# Patient Record
Sex: MALE | Race: BLACK OR AFRICAN AMERICAN | Employment: UNEMPLOYED | URBAN - METROPOLITAN AREA
[De-identification: names, ages, dates, MRNs, and addresses within clinical notes are randomized per-mention and may not be internally consistent; named-entity substitution may affect disease eponyms.]

---

## 2020-06-08 ENCOUNTER — VIRTUAL VISIT (OUTPATIENT)
Dept: INTERNAL MEDICINE CLINIC | Age: 60
End: 2020-06-08

## 2020-06-08 DIAGNOSIS — R35.1 NOCTURIA: ICD-10-CM

## 2020-06-08 DIAGNOSIS — R06.09 DOE (DYSPNEA ON EXERTION): ICD-10-CM

## 2020-06-08 DIAGNOSIS — I10 ESSENTIAL HYPERTENSION: Primary | ICD-10-CM

## 2020-06-08 DIAGNOSIS — F17.200 SMOKER: ICD-10-CM

## 2020-06-08 DIAGNOSIS — R05.3 CHRONIC COUGH: ICD-10-CM

## 2020-06-08 RX ORDER — HYDROCHLOROTHIAZIDE 25 MG/1
TABLET ORAL
Qty: 90 TAB | Refills: 0 | Status: SHIPPED | OUTPATIENT
Start: 2020-06-08 | End: 2020-07-23 | Stop reason: SDUPTHER

## 2020-06-08 NOTE — PROGRESS NOTES
Chief Complaint   Patient presents with   Inez Chavez Establish Care    Breathing Problem     pt is requesting an xray of lungs    Labs     pt is requesting prostate labs     Shoulder Pain     pt states he has pain when attempting to lift arms above head       1. Have you been to the ER, urgent care clinic since your last visit? Hospitalized since your last visit? No    2. Have you seen or consulted any other health care providers outside of the [address] [place] [address] since your last visit? Include any pap smears or colon screening.  No

## 2020-06-08 NOTE — PROGRESS NOTES
Judy Braxton is a 63 y.o. male evaluated via audio only technology on 6/8/2020. Consent: He and/or his health care decision maker is aware that he may receive a bill for this audio only encounter, depending on his insurance coverage, and has provided verbal consent to proceed: Yes    I communicated with the patient and/or health care decision maker about the nature and details of the following:  Assessment & Plan:   Diagnoses and all orders for this visit:    1. Essential hypertension  -     METABOLIC PANEL, COMPREHENSIVE  -     CBC WITH AUTOMATED DIFF  -     LIPID PANEL    2. PENA (dyspnea on exertion)  -     XR CHEST PA LAT; Future    3. Smoker    4. Chronic cough  -     XR CHEST PA LAT; Future    5. Nocturia  -     PSA W/ REFLX FREE PSA    Other orders  -     hydroCHLOROthiazide (HYDRODIURIL) 25 mg tablet; TAKE ONE TABLET BY MOUTH EVERY DAY FOR BLOOD PRESSURE      The complexity of medical decision making for this visit is moderate     Shoulder pain is not acute will eval in office    12  Subjective: Judy Braxton is a 63 y.o. male who was seen for Establish Care; Breathing Problem (pt is requesting an xray of lungs); Labs (pt is requesting prostate labs ); and Shoulder Pain (pt states he has pain when attempting to lift arms above head)      Prior to Admission medications    Medication Sig Start Date End Date Taking?  Authorizing Provider   hydroCHLOROthiazide (HYDRODIURIL) 25 mg tablet TAKE ONE TABLET BY MOUTH EVERY DAY FOR BLOOD PRESSURE 6/8/20  Yes William Brooks NP   hydrochlorothiazide (HYDRODIURIL) 25 mg tablet TAKE ONE TABLET BY MOUTH EVERY DAY FOR BLOOD PRESSURE 8/31/16 6/8/20  Mahad Birmingham MD   hydrochlorothiazide (HYDRODIURIL) 25 mg tablet TAKE ONE TABLET BY MOUTH EVERY DAY FOR BLOOD PRESSURE 7/22/14 6/8/20  Mahad Birmingham MD     No Known Allergies    Patient Active Problem List   Diagnosis Code    Pharyngitis, acute J02.9    Hypertension I10       He is looking to est care, last seen at [facility]    Hypertension ROS:  taking medications as instructed, no medication side effects noted, no TIAs, no chest pain on exertion, no dyspnea on exertion, no swelling of ankles reports bp has been good at [facility]    Prostatic symptoms; nocturia - several times per night - no recent labs    Shoulder pain; acute on chronic - believes he had an injury while lifting weight some years ago. Pain w/ abduction - is able to fully abduct. occ tinging to hands. No     Primary concern is PENA: this has been going for approx 6 months but worsening. He says that when he exerts himself sometimes he has trouble catching his breath - he says that It feels like there is fluid in his lungs. He denies leg swelling, chest pain. He says it seems to be worse w/ humid weather. Denies wheezing. No doshi done thus far. He does smoke 1 PPD  He denies leg swelling. He thinks his last weight is in the 170's  He has occ cough w/ clear mucous        ROS    I affirm this is a Patient-Initiated Episode with a Patient who has not had a related appointment within my department in the past 7 days or scheduled within the next 24 hours. Total Time: minutes: 11-20 minutes    Note: not billable if this call serves to triage the patient into an appointment for the relevant concern      Cindy Grace.  [address] [facility], NP

## 2020-06-09 ENCOUNTER — HOSPITAL ENCOUNTER (OUTPATIENT)
Dept: GENERAL RADIOLOGY | Age: 60
Discharge: HOME | End: 2020-06-09
Payer: COMMERCIAL

## 2020-06-09 DIAGNOSIS — R05.3 CHRONIC COUGH: ICD-10-CM

## 2020-06-09 DIAGNOSIS — R06.09 DOE (DYSPNEA ON EXERTION): ICD-10-CM

## 2020-06-09 PROCEDURE — 71046 X-RAY EXAM CHEST 2 VIEWS: CPT

## 2020-06-10 ENCOUNTER — TELEPHONE (OUTPATIENT)
Dept: INTERNAL MEDICINE CLINIC | Age: 60
End: 2020-06-10

## 2020-06-10 LAB
ALBUMIN SERPL-MCNC: 4.4 G/DL (ref 3.8–4.9)
ALBUMIN/GLOB SERPL: 1.6 {RATIO} (ref 1.2–2.2)
ALP SERPL-CCNC: 109 IU/L (ref 39–117)
ALT SERPL-CCNC: 24 IU/L (ref 0–44)
AST SERPL-CCNC: 25 IU/L (ref 0–40)
BASOPHILS # BLD AUTO: 0 X10E3/UL (ref 0–0.2)
BASOPHILS NFR BLD AUTO: 1 %
BILIRUB SERPL-MCNC: 0.3 MG/DL (ref 0–1.2)
BUN SERPL-MCNC: 15 MG/DL (ref 6–24)
BUN/CREAT SERPL: 12 (ref 9–20)
CALCIUM SERPL-MCNC: 9.7 MG/DL (ref 8.7–10.2)
CHLORIDE SERPL-SCNC: 99 MMOL/L (ref 96–106)
CHOLEST SERPL-MCNC: 188 MG/DL (ref 100–199)
CO2 SERPL-SCNC: 29 MMOL/L (ref 20–29)
CREAT SERPL-MCNC: 1.26 MG/DL (ref 0.76–1.27)
EOSINOPHIL # BLD AUTO: 0.2 X10E3/UL (ref 0–0.4)
EOSINOPHIL NFR BLD AUTO: 3 %
ERYTHROCYTE [DISTWIDTH] IN BLOOD BY AUTOMATED COUNT: 13.1 % (ref 11.6–15.4)
GLOBULIN SER CALC-MCNC: 2.8 G/DL (ref 1.5–4.5)
GLUCOSE SERPL-MCNC: 90 MG/DL (ref 65–99)
HCT VFR BLD AUTO: 47.1 % (ref 37.5–51)
HDLC SERPL-MCNC: 64 MG/DL
HGB BLD-MCNC: 15.1 G/DL (ref 13–17.7)
IMM GRANULOCYTES # BLD AUTO: 0 X10E3/UL (ref 0–0.1)
IMM GRANULOCYTES NFR BLD AUTO: 0 %
INTERPRETATION, 910389: NORMAL
LDLC SERPL CALC-MCNC: 99 MG/DL (ref 0–99)
LYMPHOCYTES # BLD AUTO: 2.1 X10E3/UL (ref 0.7–3.1)
LYMPHOCYTES NFR BLD AUTO: 29 %
MCH RBC QN AUTO: 27.1 PG (ref 26.6–33)
MCHC RBC AUTO-ENTMCNC: 32.1 G/DL (ref 31.5–35.7)
MCV RBC AUTO: 84 FL (ref 79–97)
MONOCYTES # BLD AUTO: 0.7 X10E3/UL (ref 0.1–0.9)
MONOCYTES NFR BLD AUTO: 9 %
NEUTROPHILS # BLD AUTO: 4.3 X10E3/UL (ref 1.4–7)
NEUTROPHILS NFR BLD AUTO: 58 %
PLATELET # BLD AUTO: 264 X10E3/UL (ref 150–450)
POTASSIUM SERPL-SCNC: 4.3 MMOL/L (ref 3.5–5.2)
PROT SERPL-MCNC: 7.2 G/DL (ref 6–8.5)
PSA SERPL-MCNC: 1.6 NG/ML (ref 0–4)
RBC # BLD AUTO: 5.58 X10E6/UL (ref 4.14–5.8)
REFLEX CRITERIA: NORMAL
SODIUM SERPL-SCNC: 141 MMOL/L (ref 134–144)
TRIGL SERPL-MCNC: 126 MG/DL (ref 0–149)
VLDLC SERPL CALC-MCNC: 25 MG/DL (ref 5–40)
WBC # BLD AUTO: 7.3 X10E3/UL (ref 3.4–10.8)

## 2020-06-10 RX ORDER — ALBUTEROL SULFATE 90 UG/1
1 AEROSOL, METERED RESPIRATORY (INHALATION)
Qty: 1 INHALER | Refills: 0 | Status: SHIPPED | OUTPATIENT
Start: 2020-06-10 | End: 2020-07-23 | Stop reason: SDUPTHER

## 2020-06-10 NOTE — TELEPHONE ENCOUNTER
Informed pt of lab results, x-ray findings, and rx sent. Pt understood. Informed pt provider recommends that he stop smoking. Pt would like a rx for nicotine patches.  Pt is scheduled 07/23/2020 in office at 3:45PM.

## 2020-06-10 NOTE — TELEPHONE ENCOUNTER
----- Message from Jatin Grace. Raul Contreras NP sent at 6/10/2020  8:40 AM EDT -----  Please let him know there is no fluid or masses seen on his cxr  He does have what is called hyperinflated lungs which means that over time his lungs have expanded to try to get more oxygen - likely from his smoking  This is consistent w/ COPD or emphysema  He needs to quit smoking! I have also sent an inhaler to the pharmacy for when he feels winded  Pharmacy can show him how to use if he has not used one before and then schedule him to see me in about 4 weeks  Also see if he wants to try some patches for smoking - sometimes covered by insurance sometimes not  ----- Message -----  From: Jorge Landaverde Results In  Sent: 6/9/2020   1:50 PM EDT  To: Jatin Grace.  Raul Contreras NP

## 2020-06-11 RX ORDER — IBUPROFEN 200 MG
1 TABLET ORAL EVERY 24 HOURS
Qty: 30 PATCH | Refills: 0 | Status: SHIPPED | OUTPATIENT
Start: 2020-06-11 | End: 2020-07-22

## 2020-07-22 RX ORDER — IBUPROFEN 200 MG
TABLET ORAL
Qty: 28 PATCH | Refills: 0 | Status: SHIPPED | OUTPATIENT
Start: 2020-07-22 | End: 2021-05-21 | Stop reason: SDUPTHER

## 2020-07-23 ENCOUNTER — OFFICE VISIT (OUTPATIENT)
Dept: INTERNAL MEDICINE CLINIC | Age: 60
End: 2020-07-23

## 2020-07-23 VITALS
TEMPERATURE: 99.2 F | WEIGHT: 176 LBS | SYSTOLIC BLOOD PRESSURE: 130 MMHG | OXYGEN SATURATION: 91 % | HEART RATE: 83 BPM | RESPIRATION RATE: 18 BRPM | BODY MASS INDEX: 26.07 KG/M2 | DIASTOLIC BLOOD PRESSURE: 90 MMHG | HEIGHT: 69 IN

## 2020-07-23 DIAGNOSIS — Z12.11 COLON CANCER SCREENING: ICD-10-CM

## 2020-07-23 DIAGNOSIS — R06.09 DOE (DYSPNEA ON EXERTION): ICD-10-CM

## 2020-07-23 DIAGNOSIS — R35.0 URINARY FREQUENCY: Primary | ICD-10-CM

## 2020-07-23 DIAGNOSIS — I10 ESSENTIAL HYPERTENSION: ICD-10-CM

## 2020-07-23 DIAGNOSIS — F17.210 CIGARETTE NICOTINE DEPENDENCE WITHOUT COMPLICATION: ICD-10-CM

## 2020-07-23 LAB
BILIRUB UR QL STRIP: NORMAL
GLUCOSE UR-MCNC: NEGATIVE MG/DL
KETONES P FAST UR STRIP-MCNC: NORMAL MG/DL
PH UR STRIP: 5 [PH] (ref 4.6–8)
PROT UR QL STRIP: NORMAL
SP GR UR STRIP: 1.03 (ref 1–1.03)
UA UROBILINOGEN AMB POC: NORMAL (ref 0.2–1)
URINALYSIS CLARITY POC: CLEAR
URINALYSIS COLOR POC: NORMAL
URINE BLOOD POC: NORMAL
URINE LEUKOCYTES POC: NEGATIVE
URINE NITRITES POC: NEGATIVE

## 2020-07-23 RX ORDER — VARENICLINE TARTRATE 25 MG
KIT ORAL
Qty: 1 DOSE PACK | Refills: 0 | Status: SHIPPED | OUTPATIENT
Start: 2020-07-23 | End: 2021-05-20 | Stop reason: SINTOL

## 2020-07-23 RX ORDER — TAMSULOSIN HYDROCHLORIDE 0.4 MG/1
0.4 CAPSULE ORAL DAILY
Qty: 30 CAP | Refills: 0 | Status: SHIPPED | OUTPATIENT
Start: 2020-07-23 | End: 2022-03-17 | Stop reason: SDUPTHER

## 2020-07-23 RX ORDER — ALBUTEROL SULFATE 90 UG/1
1 AEROSOL, METERED RESPIRATORY (INHALATION)
Qty: 1 INHALER | Refills: 0 | Status: SHIPPED | OUTPATIENT
Start: 2020-07-23 | End: 2021-05-20 | Stop reason: SDUPTHER

## 2020-07-23 RX ORDER — HYDROCHLOROTHIAZIDE 25 MG/1
TABLET ORAL
Qty: 90 TAB | Refills: 0 | Status: SHIPPED | OUTPATIENT
Start: 2020-07-23 | End: 2020-10-19

## 2020-07-23 NOTE — PROGRESS NOTES
Subjective: (As above and below)     Chief Complaint   Patient presents with    Hypertension     follow up    Request For New Medication     nicotine pills     Mass     pt reports lump in both hands      Stacy Emmanuel is a 63y.o. year old male who presents for     Hypertension ROS:  taking medications as instructed, no medication side effects noted, no TIAs, no chest pain on exertion, no dyspnea on exertion, no swelling of ankles    Smoking: patches have not worked, he is interested in oral agents, has not yet tried  He still has some PENA which is random, he feels like when he has to exert himself he gets winded but not always. He uses his inhaler infrequently. He denies leg swelling or chest pain    Urinary frequency: he voids a lot thru the day and throughout the night. He is on hctz, however, has still had symptoms when he had moments w/o meds. He is not drinking excess caffeine and has s/s even on days when he is not drinking a lot of fluids. PSA was normal  No constipation  No dysuria. Colonoscopy: due          Reviewed PmHx, RxHx, FmHx, SocHx, AllgHx and updated in chart. History reviewed. No pertinent family history. Past Medical History:   Diagnosis Date    Hypertension       Social History     Socioeconomic History    Marital status:      Spouse name: Not on file    Number of children: 1    Years of education: Not on file    Highest education level: Not on file   Occupational History    Occupation: [employer]     Employer: [employer]     Comment: s. Tobacco Use    Smoking status: Current Every Day Smoker     Packs/day: 1.00    Smokeless tobacco: Never Used   Substance and Sexual Activity    Alcohol use: No    Drug use: No    Sexual activity: Not Currently     Partners: Female   Social History Narrative    Lives with girlfriend, has one son, age 15, lives with mother.  Works at [employer] for 3 years          Current Outpatient Medications   Medication Sig    hydroCHLOROthiazide (HYDRODIURIL) 25 mg tablet TAKE ONE TABLET BY MOUTH EVERY DAY FOR BLOOD PRESSURE    albuterol (PROVENTIL HFA, VENTOLIN HFA, PROAIR HFA) 90 mcg/actuation inhaler Take 1 Puff by inhalation every six (6) hours as needed for Wheezing or Shortness of Breath.  varenicline (CHANTIX STARTER KARLOS) 0.5 mg (11)- 1 mg (42) DsPk Per dose pack    tamsulosin (Flomax) 0.4 mg capsule Take 1 Cap by mouth daily.  nicotine (NICODERM CQ) 21 mg/24 hr APPLY 1 PATCH BY TRANSDERMAL ROUTE EVERY TWENTY-FOUR (24) HOURS FOR 30 DAYS. No current facility-administered medications for this visit. Review of Systems:   Constitutional:    Negative for fever and chills, negative diaphoresis. HEENT:              Negative for neck pain and stiffness. Eyes:                  Negative for visual disturbance, itching, redness or discharge. Respiratory:        Negative for cough and shortness of breath. Cardiovascular:  Negative for chest pain and palpitations. Gastrointestinal: Negative for nausea, vomiting, abdominal pain, diarrhea or constipation. Genitourinary:     Negative for dysuria and frequency. Musculoskeletal: Negative for falls, tenderness and swelling. Skin:                    Rough patch on hands  Neurological:       Negative for dizzyness, seizure, loss of consciousness, weakness and numbness.      Objective:     Vitals:    07/23/20 1548 07/23/20 1632   BP: (!) 128/100 130/90   Pulse: 83    Resp: 18    Temp: 99.2 °F (37.3 °C)    TempSrc: Oral    SpO2: 91%    Weight: 176 lb (79.8 kg)    Height: 5' 9\" (1.753 m)        Results for orders placed or performed in visit on 07/23/20   AMB POC URINALYSIS DIP STICK AUTO W/O MICRO   Result Value Ref Range    Color (UA POC) Dark Yellow     Clarity (UA POC) Clear     Glucose (UA POC) Negative Negative    Bilirubin (UA POC) 1+ Negative    Ketones (UA POC) Trace Negative    Specific gravity (UA POC) 1.030 1.001 - 1.035    Blood (UA POC) Trace Negative    pH (UA POC) 5.0 4.6 - 8.0    Protein (UA POC) 1+ Negative    Urobilinogen (UA POC) 1 mg/dL 0.2 - 1    Nitrites (UA POC) Negative Negative    Leukocyte esterase (UA POC) Negative Negative         Physical Examination: General appearance - alert, well appearing, and in no distress  Chest - clear to auscultation, no wheezes, rales or rhonchi, symmetric air entry  Heart - normal rate, regular rhythm, normal S1, S2, no murmurs, rubs, clicks or gallops  Skin - hands calloused    Assessment/ Plan:     Follow-up and Dispositions    · Return in about 3 months (around 10/23/2020), or if symptoms worsen or fail to improve. 1. Urinary frequency  ? BPH trial flomax while getting in w/ urology  - AMB POC URINALYSIS DIP STICK AUTO W/O MICRO  - REFERRAL TO UROLOGY  - tamsulosin (Flomax) 0.4 mg capsule; Take 1 Cap by mouth daily. Dispense: 30 Cap; Refill: 0    2. Colon cancer screening    - REFERRAL TO GASTROENTEROLOGY    3. Cigarette nicotine dependence without complication  Discussed med use  - varenicline (CHANTIX STARTER KARLOS) 0.5 mg (11)- 1 mg (42) DsPk; Per dose pack  Dispense: 1 Dose Pack; Refill: 0    4. Essential hypertension  Consider trialing to different med if urinary s/s do not change w/ flomax    5. PENA (dyspnea on exertion)    - ECHO ADULT COMPLETE; Future        I have discussed the diagnosis with the patient and the intended plan as seen in the above orders. The patient has received an after-visit summary and questions were answered concerning future plans. Pt conveyed understanding of plan. Medication Side Effects and Warnings were discussed with patient: yes  Patient Labs were reviewed: yes  Patient Past Records were reviewed:  yes    Liliana Mcguire.  Julissa Johnson, GUILLE

## 2020-07-23 NOTE — PROGRESS NOTES
Chief Complaint   Patient presents with    Hypertension     follow up    Request For New Medication     nicotine pills     Mass     pt reports lump in both hands        1. Have you been to the ER, urgent care clinic since your last visit? Hospitalized since your last visit? No    2. Have you seen or consulted any other health care providers outside of the [address] [place] [address] since your last visit? Include any pap smears or colon screening.  No

## 2020-07-23 NOTE — PATIENT INSTRUCTIONS
Goal blood pressure is less than 140/90- check a few times per week and write down    Try flomax for urine symptoms - if this does not help over the next week or so then we will try a different blood pressure med    Let me know when you are done with the chantix starter pack    Ultrasound of heart ordered      Varenicline (By mouth)   Varenicline ([id number])  Helps you quit smoking, as part of a support program.   Brand Name(s): Chantix, Chantix Starting Month Rufino   There may be other brand names for this medicine. When This Medicine Should Not Be Used: This medicine is not right for everyone. Do not use it if you had an allergic reaction to varenicline. How to Use This Medicine:   Tablet  · Take your medicine as directed. Your dose or schedule may need to be changed to find what works best for you. · Tell your doctor what date you have set to stop smoking. This medicine needs to be started 1 week before that date. · It is best to take this medicine with a full glass of water after you eat. · This medicine should come with a Medication Guide. Ask your pharmacist for a copy if you do not have one. · Missed dose: Take a dose as soon as you remember. If it is almost time for your next dose, wait until then and take a regular dose. Do not take extra medicine to make up for a missed dose. · Store the medicine in a closed container at room temperature, away from heat, moisture, and direct light. Drugs and Foods to Avoid:   Ask your doctor or pharmacist before using any other medicine, including over-the-counter medicines, vitamins, and herbal products. · Some medicines can affect how varenicline works. Tell your doctor if you are using any of the following:  ¨ Insulin  ¨ Theophylline  ¨ Blood thinner (including warfarin)  · This medicine can affect your ability to tolerate alcohol. Limit the amount of alcohol that you drink until you know how this medicine affects you.   Warnings While Using This Medicine: · Tell your doctor if you are pregnant or breastfeeding, or if you have kidney disease, heart or blood vessel problems, angina, or a history of heart attack, stroke, depression or mental health problems, or seizures. · For some people, this medicine may increase mental or emotional problems. This may lead to thoughts of suicide and violence. Talk with your doctor right away if you have any thought or behavior changes that concern you. Tell your doctor if you or anyone in your family has a history of bipolar disorder or suicide attempts. · This medicine may cause the following problems:  ¨ Increased risk of heart attack or stroke  ¨ Serious skin reactions  ¨ Sleepwalking  · This medicine may cause you to become dizzy or drowsy, or have trouble concentrating. Do not drive or do anything else that could be dangerous until you know this medicine affects you. · Your doctor will check your progress and the effects of this medicine at regular visits. Keep all appointments. · Keep all medicine out of the reach of children. Never share your medicine with anyone.   Possible Side Effects While Using This Medicine:   Call your doctor right away if you notice any of these side effects:  · Allergic reaction: Itching or hives, swelling in your face or hands, swelling or tingling in your mouth or throat, chest tightness, trouble breathing  · Blistering, peeling, red skin rash  · Chest pain, fast, pounding, or uneven heartbeat  · Feeling anxious, depressed, restless, or irritable  · Numbness or weakness on one side of your body, sudden or severe headache, problems with vision, speech, or walking  · Seeing or hearing things that are not really there  · Seizures  · Thoughts of hurting yourself or others, unusual moods or behaviors  If you notice these less serious side effects, talk with your doctor:   · Headache  · Nausea, gas  · Trouble sleeping, unusual dreams, sleepwalking  If you notice other side effects that you think are caused by this medicine, tell your doctor. Call your doctor for medical advice about side effects. You may report side effects to FDA at 4-658-CLV-1655  © 2017 [facility] Information is for End User's use only and may not be sold, redistributed or otherwise used for commercial purposes. The above information is an  only. It is not intended as medical advice for individual conditions or treatments. Talk to your doctor, nurse or pharmacist before following any medical regimen to see if it is safe and effective for you.

## 2020-07-31 ENCOUNTER — HOSPITAL ENCOUNTER (OUTPATIENT)
Dept: NON INVASIVE DIAGNOSTICS | Age: 60
Discharge: HOME | End: 2020-07-31
Attending: NURSE PRACTITIONER
Payer: COMMERCIAL

## 2020-07-31 DIAGNOSIS — R06.09 DOE (DYSPNEA ON EXERTION): ICD-10-CM

## 2020-07-31 LAB
ECHO AO ROOT DIAM: 2.75 CM
ECHO AV AREA PLAN: 3.27 CM2
ECHO EST RA PRESSURE: 5 MMHG
ECHO LA AREA 4C: 11.7 CM2
ECHO LA MAJOR AXIS: 3.36 CM
ECHO LA VOL 4C: 24.21 ML (ref 18–58)
ECHO LV EDV A4C: 74.63 ML
ECHO LV EJECTION FRACTION A4C: 62 PERCENT
ECHO LV ESV A4C: 28.41 ML
ECHO LV INTERNAL DIMENSION DIASTOLIC: 4.99 CM (ref 4.2–5.9)
ECHO LV INTERNAL DIMENSION SYSTOLIC: 3.44 CM
ECHO LV IVSD: 1.12 CM (ref 0.6–1)
ECHO LV MASS 2D: 216.9 G (ref 88–224)
ECHO LV POSTERIOR WALL DIASTOLIC: 1.16 CM (ref 0.6–1)
ECHO LVOT DIAM: 2.07 CM
ECHO LVOT PEAK GRADIENT: 1.58 MMHG
ECHO LVOT PEAK VELOCITY: 62.81 CM/S
ECHO MV A VELOCITY: 38.97 CM/S
ECHO MV AREA PHT: 6.89 CM2
ECHO MV AREA PLAN: 6.43 CM2
ECHO MV E DECELERATION TIME (DT): 0.09 S
ECHO MV E VELOCITY: 32.8 CM/S
ECHO MV E/A RATIO: 0.84
ECHO MV MAX VELOCITY: 59.69 CM/S
ECHO MV MEAN GRADIENT: 0.38 MMHG
ECHO MV PEAK GRADIENT: 1.43 MMHG
ECHO MV PRESSURE HALF TIME (PHT): 0.03 S
ECHO MV VTI: 11.68 CM
ECHO PV PEAK INSTANTANEOUS GRADIENT SYSTOLIC: 2.62 MMHG
ECHO PV REGURGITANT MAX VELOCITY: 80.76 CM/S
ECHO RA AREA 4C: 14.2 CM2
ECHO RIGHT VENTRICULAR SYSTOLIC PRESSURE (RVSP): 34.51 MMHG
ECHO TV REGURGITANT MAX VELOCITY: 271.61 CM/S
ECHO TV REGURGITANT PEAK GRADIENT: 29.51 MMHG

## 2020-07-31 PROCEDURE — 93306 TTE W/DOPPLER COMPLETE: CPT

## 2020-08-13 DIAGNOSIS — J43.9 PULMONARY EMPHYSEMA, UNSPECIFIED EMPHYSEMA TYPE (HCC): Primary | ICD-10-CM

## 2020-09-25 ENCOUNTER — HOSPITAL ENCOUNTER (OUTPATIENT)
Dept: CT IMAGING | Age: 60
Discharge: HOME | End: 2020-09-25
Attending: INTERNAL MEDICINE
Payer: COMMERCIAL

## 2020-09-25 DIAGNOSIS — Z87.891 PERSONAL HISTORY OF NICOTINE DEPENDENCE: ICD-10-CM

## 2020-09-25 PROCEDURE — G0297 LDCT FOR LUNG CA SCREEN: HCPCS

## 2020-10-19 RX ORDER — HYDROCHLOROTHIAZIDE 25 MG/1
TABLET ORAL
Qty: 90 TAB | Refills: 0 | Status: SHIPPED | OUTPATIENT
Start: 2020-10-19 | End: 2021-01-26

## 2020-10-23 ENCOUNTER — ANESTHESIA EVENT (OUTPATIENT)
Dept: SURGERY | Age: 60
End: 2020-10-23
Payer: COMMERCIAL

## 2020-10-23 ENCOUNTER — HOSPITAL ENCOUNTER (OUTPATIENT)
Dept: PREADMISSION TESTING | Age: 60
Discharge: HOME | End: 2020-10-23
Payer: COMMERCIAL

## 2020-10-23 PROCEDURE — 87635 SARS-COV-2 COVID-19 AMP PRB: CPT

## 2020-10-24 LAB
HEALTH STATUS, XMCV2T: NORMAL
SARS-COV-2, COV2NT: NOT DETECTED
SOURCE, COVRS: NORMAL
SPECIMEN SOURCE, FCOV2M: NORMAL
SPECIMEN TYPE, XMCV1T: NORMAL

## 2020-10-27 ENCOUNTER — HOSPITAL ENCOUNTER (OUTPATIENT)
Age: 60
Setting detail: OUTPATIENT SURGERY
Discharge: HOME | End: 2020-10-27
Attending: INTERNAL MEDICINE | Admitting: INTERNAL MEDICINE
Payer: COMMERCIAL

## 2020-10-27 ENCOUNTER — ANESTHESIA (OUTPATIENT)
Dept: SURGERY | Age: 60
End: 2020-10-27
Payer: COMMERCIAL

## 2020-10-27 VITALS
DIASTOLIC BLOOD PRESSURE: 88 MMHG | WEIGHT: 188 LBS | OXYGEN SATURATION: 95 % | RESPIRATION RATE: 14 BRPM | SYSTOLIC BLOOD PRESSURE: 120 MMHG | BODY MASS INDEX: 27.85 KG/M2 | HEART RATE: 66 BPM | TEMPERATURE: 98.1 F | HEIGHT: 69 IN

## 2020-10-27 PROCEDURE — 76060000031 HC ANESTHESIA FIRST 0.5 HR: Performed by: INTERNAL MEDICINE

## 2020-10-27 PROCEDURE — 2709999900 HC NON-CHARGEABLE SUPPLY: Performed by: INTERNAL MEDICINE

## 2020-10-27 PROCEDURE — 76010000154 HC OR TIME FIRST 0.5 HR: Performed by: INTERNAL MEDICINE

## 2020-10-27 PROCEDURE — 74011000250 HC RX REV CODE- 250: Performed by: ANESTHESIOLOGY

## 2020-10-27 PROCEDURE — 76210000020 HC REC RM PH II FIRST 0.5 HR: Performed by: INTERNAL MEDICINE

## 2020-10-27 PROCEDURE — 76210000063 HC OR PH I REC FIRST 0.5 HR: Performed by: INTERNAL MEDICINE

## 2020-10-27 PROCEDURE — 74011250636 HC RX REV CODE- 250/636: Performed by: ANESTHESIOLOGY

## 2020-10-27 RX ORDER — DEXTROSE MONOHYDRATE AND SODIUM CHLORIDE 5; .9 G/100ML; G/100ML
100 INJECTION, SOLUTION INTRAVENOUS CONTINUOUS
Status: DISCONTINUED | OUTPATIENT
Start: 2020-10-27 | End: 2020-10-27 | Stop reason: HOSPADM

## 2020-10-27 RX ORDER — FLUMAZENIL 0.1 MG/ML
0.2 INJECTION INTRAVENOUS
Status: DISCONTINUED | OUTPATIENT
Start: 2020-10-27 | End: 2020-10-27 | Stop reason: HOSPADM

## 2020-10-27 RX ORDER — SODIUM CHLORIDE 0.9 % (FLUSH) 0.9 %
5-40 SYRINGE (ML) INJECTION AS NEEDED
Status: DISCONTINUED | OUTPATIENT
Start: 2020-10-27 | End: 2020-10-27 | Stop reason: SDUPTHER

## 2020-10-27 RX ORDER — HYDROMORPHONE HYDROCHLORIDE 1 MG/ML
0.5 INJECTION, SOLUTION INTRAMUSCULAR; INTRAVENOUS; SUBCUTANEOUS
Status: DISCONTINUED | OUTPATIENT
Start: 2020-10-27 | End: 2020-10-27 | Stop reason: HOSPADM

## 2020-10-27 RX ORDER — SODIUM CHLORIDE 0.9 % (FLUSH) 0.9 %
5-40 SYRINGE (ML) INJECTION EVERY 8 HOURS
Status: DISCONTINUED | OUTPATIENT
Start: 2020-10-27 | End: 2020-10-27 | Stop reason: HOSPADM

## 2020-10-27 RX ORDER — LIDOCAINE HYDROCHLORIDE 20 MG/ML
INJECTION, SOLUTION INFILTRATION; PERINEURAL AS NEEDED
Status: DISCONTINUED | OUTPATIENT
Start: 2020-10-27 | End: 2020-10-27 | Stop reason: HOSPADM

## 2020-10-27 RX ORDER — ATROPINE SULFATE 1 MG/ML
0.5 INJECTION, SOLUTION INTRAVENOUS
Status: DISCONTINUED | OUTPATIENT
Start: 2020-10-27 | End: 2020-10-27 | Stop reason: HOSPADM

## 2020-10-27 RX ORDER — SODIUM CHLORIDE 0.9 % (FLUSH) 0.9 %
5-40 SYRINGE (ML) INJECTION EVERY 8 HOURS
Status: DISCONTINUED | OUTPATIENT
Start: 2020-10-27 | End: 2020-10-27 | Stop reason: SDUPTHER

## 2020-10-27 RX ORDER — FENTANYL CITRATE 50 UG/ML
100 INJECTION, SOLUTION INTRAMUSCULAR; INTRAVENOUS ONCE
Status: DISCONTINUED | OUTPATIENT
Start: 2020-10-27 | End: 2020-10-27 | Stop reason: HOSPADM

## 2020-10-27 RX ORDER — EPINEPHRINE 0.1 MG/ML
1 INJECTION INTRACARDIAC; INTRAVENOUS
Status: DISCONTINUED | OUTPATIENT
Start: 2020-10-27 | End: 2020-10-27 | Stop reason: HOSPADM

## 2020-10-27 RX ORDER — MIDAZOLAM HYDROCHLORIDE 1 MG/ML
5 INJECTION, SOLUTION INTRAMUSCULAR; INTRAVENOUS
Status: DISCONTINUED | OUTPATIENT
Start: 2020-10-27 | End: 2020-10-27 | Stop reason: HOSPADM

## 2020-10-27 RX ORDER — DEXTROMETHORPHAN/PSEUDOEPHED 2.5-7.5/.8
1.2 DROPS ORAL
Status: DISCONTINUED | OUTPATIENT
Start: 2020-10-27 | End: 2020-10-27 | Stop reason: HOSPADM

## 2020-10-27 RX ORDER — SODIUM CHLORIDE 0.9 % (FLUSH) 0.9 %
5-40 SYRINGE (ML) INJECTION AS NEEDED
Status: DISCONTINUED | OUTPATIENT
Start: 2020-10-27 | End: 2020-10-27 | Stop reason: HOSPADM

## 2020-10-27 RX ORDER — NALOXONE HYDROCHLORIDE 0.4 MG/ML
0.4 INJECTION, SOLUTION INTRAMUSCULAR; INTRAVENOUS; SUBCUTANEOUS
Status: DISCONTINUED | OUTPATIENT
Start: 2020-10-27 | End: 2020-10-27 | Stop reason: HOSPADM

## 2020-10-27 RX ORDER — SODIUM CHLORIDE 9 MG/ML
50 INJECTION, SOLUTION INTRAVENOUS CONTINUOUS
Status: DISCONTINUED | OUTPATIENT
Start: 2020-10-27 | End: 2020-10-27 | Stop reason: HOSPADM

## 2020-10-27 RX ORDER — PROPOFOL 10 MG/ML
INJECTION, EMULSION INTRAVENOUS AS NEEDED
Status: DISCONTINUED | OUTPATIENT
Start: 2020-10-27 | End: 2020-10-27 | Stop reason: HOSPADM

## 2020-10-27 RX ORDER — SODIUM CHLORIDE, SODIUM LACTATE, POTASSIUM CHLORIDE, CALCIUM CHLORIDE 600; 310; 30; 20 MG/100ML; MG/100ML; MG/100ML; MG/100ML
50 INJECTION, SOLUTION INTRAVENOUS CONTINUOUS
Status: DISCONTINUED | OUTPATIENT
Start: 2020-10-27 | End: 2020-10-27 | Stop reason: HOSPADM

## 2020-10-27 RX ORDER — LIDOCAINE HYDROCHLORIDE 20 MG/ML
5 SOLUTION OROPHARYNGEAL AS NEEDED
Status: DISCONTINUED | OUTPATIENT
Start: 2020-10-27 | End: 2020-10-27 | Stop reason: HOSPADM

## 2020-10-27 RX ORDER — SODIUM CHLORIDE 9 MG/ML
100 INJECTION, SOLUTION INTRAVENOUS CONTINUOUS
Status: DISCONTINUED | OUTPATIENT
Start: 2020-10-27 | End: 2020-10-27 | Stop reason: HOSPADM

## 2020-10-27 RX ORDER — FENTANYL CITRATE 50 UG/ML
25 INJECTION, SOLUTION INTRAMUSCULAR; INTRAVENOUS
Status: DISCONTINUED | OUTPATIENT
Start: 2020-10-27 | End: 2020-10-27 | Stop reason: HOSPADM

## 2020-10-27 RX ORDER — LIDOCAINE HYDROCHLORIDE 10 MG/ML
0.1 INJECTION, SOLUTION EPIDURAL; INFILTRATION; INTRACAUDAL; PERINEURAL AS NEEDED
Status: DISCONTINUED | OUTPATIENT
Start: 2020-10-27 | End: 2020-10-27 | Stop reason: HOSPADM

## 2020-10-27 RX ADMIN — PROPOFOL 80 MG: 10 INJECTION, EMULSION INTRAVENOUS at 10:38

## 2020-10-27 RX ADMIN — PROPOFOL 10 MG: 10 INJECTION, EMULSION INTRAVENOUS at 10:39

## 2020-10-27 RX ADMIN — SODIUM CHLORIDE, POTASSIUM CHLORIDE, SODIUM LACTATE AND CALCIUM CHLORIDE: 600; 310; 30; 20 INJECTION, SOLUTION INTRAVENOUS at 09:15

## 2020-10-27 RX ADMIN — PROPOFOL 10 MG: 10 INJECTION, EMULSION INTRAVENOUS at 10:41

## 2020-10-27 RX ADMIN — PROPOFOL 10 MG: 10 INJECTION, EMULSION INTRAVENOUS at 10:40

## 2020-10-27 RX ADMIN — PROPOFOL 10 MG: 10 INJECTION, EMULSION INTRAVENOUS at 10:43

## 2020-10-27 RX ADMIN — PROPOFOL 10 MG: 10 INJECTION, EMULSION INTRAVENOUS at 10:44

## 2020-10-27 RX ADMIN — LIDOCAINE HYDROCHLORIDE 60 MG: 20 INJECTION, SOLUTION INFILTRATION; PERINEURAL at 10:38

## 2020-10-27 RX ADMIN — PROPOFOL 10 MG: 10 INJECTION, EMULSION INTRAVENOUS at 10:42

## 2020-10-27 NOTE — PROCEDURES
G I Procedure Note            COLONOSCOPY   Dr. Britta Espinal office   [facility] -  [address]                                   [id number]                                  [id number]   1960                                      62 y.o.                                    male      Procedure Date: 10/27/2020   [x]  Anesthesia MAC                                                                                                Pre Op Diagnosis:    Indications:                   1. COLON SCREENING                                                                                                                                                                          Post Op Diagnosis:                    1.  incomplte procedure poor prep solid stools  HEMORRHOIDS, DIVERTICULOSIS, INCOMPLETE PREP                                                                            H&p completed: Yes            Anesthesia Assessment: Performed prior to procedure:      No change  Anesthesia Plan: Performed prior to procedure:                   No change       Medications: See Reviewed List and Reconcilation           Informed consent was obtained     Risk Statement:  Prior to the procedure the risks were explained to the patient and/or to the family including but not limited to perforation, bleeding, adverse drug reaction, aspiration, and even the need for possible surgery. A colonoscopy exam is not 100% accurate which may be related to preparation or blind spots during the exam.The possibility that an abnormality and /or cancer could be missed was also discussed as well as alternative x-ray options.          Instrument:    Olympus adult Videocolonoscope                                   Immediate Procedure Reassessment Completed     With the patient in the left lateral position, a rectal examination was performed and the findings were: negative without mass, lesions or tenderness   The Olympus Video colonoscope was inserted under direct vision into the rectum. The colonoscope was passed from the rectum to the cecum, which was identified by the ileocecal valve. The colon findings demonstrated:  ANUS: Anal exam reveals no masses or external hemorrhoids, sphincter tone is normal.   RECTUM: Rectal exam reveals no masses  . SIGMOID COLON: The sigmoid was unremarkable except as noted below   Findings below   DESCENDING COLON: scattered stool The videoscolonoscope was advanced carefully. Findings below  SPLENIC FLEXURE: The splenic flexure is normal.   TRANSVERSE COLON:  Large amt solid stool procedure terminated  The typical triangular pattern was noted. Finding noted      [] mucosa normal      [x] Diverticulosis     [] avm     [] Additional findings:      A      The colonoscope was slowly withdrawn >6 minute period and the instrument was retroflexed in the rectum. The rectal findings were:Protruding lesions:     -Internal Hemorrhoids  The patient tolerated the entire procedure well.       Blood Loss nil  No complications  Anesthesia  MAC  No crystalloids  No Implants  Assistants : per nursing documentation team members     For biopsy  Specimen verification by physician and nurse two sources, name,           social security numbers     Colon preparation was poor  Recommendations:      - yearly stools for blood repeat colon 1 year 2 day prep       Copies sent to   Veena Field MD  CC:  Aicha Wiseman., NP

## 2020-10-27 NOTE — PERIOP NOTES
DISCHARGE SUMMARY from Nurse    PATIENT INSTRUCTIONS:    After general anesthesia or intravenous sedation, for 24 hours or while taking prescription Narcotics:  · Limit your activities  · Do not drive and operate hazardous machinery  · Do not make important personal or business decisions  · Do  not drink alcoholic beverages  · If you have not urinated within 8 hours after discharge, please contact your surgeon on call. Report the following to your surgeon:  · Excessive pain, swelling, redness or odor of or around the surgical area  · Temperature over 100.5  · Nausea and vomiting lasting longer than 4 hours or if unable to take medications  · Any signs of decreased circulation or nerve impairment to extremity: change in color, persistent  numbness, tingling, coldness or increase pain  · Any questions              *  Please carry medication information at all times in case of emergency situations. These are general instructions for a healthy lifestyle:    No smoking/ No tobacco products/ Avoid exposure to second hand smoke  Surgeon General's Warning:  Quitting smoking now greatly reduces serious risk to your health. Obesity, smoking, and sedentary lifestyle greatly increases your risk for illness    A healthy diet, regular physical exercise & weight monitoring are important for maintaining a healthy lifestyle    You may be retaining fluid if you have a history of heart failure or if you experience any of the following symptoms:  Weight gain of 3 pounds or more overnight or 5 pounds in a week, increased swelling in our hands or feet or shortness of breath while lying flat in bed. Please call your doctor as soon as you notice any of these symptoms; do not wait until your next office visit. The discharge information has been reviewed with the patient. The patient verbalized understanding.   Discharge medications reviewed with the patient and appropriate educational materials and side effects teaching were provided. _Pt denies any complaints at this time.  Dtr present for dc inst. Pt stable for dc to home._________________________________________________________________________________________________________________________________

## 2020-10-27 NOTE — PERIOP NOTES
Handoff Report from Operating Room to PACU    Report received from Guero Vaz and Alvin HARDY regarding Endy Engel. Surgeon(s):  Nargis Salter MD  And Procedure(s) (LRB):  COLONOSCOPY (N/A)  confirmed   with allergies discussed. Anesthesia type, drugs, patient history, complications, estimated blood loss, vital signs, intake and output, and lines and temperature were reviewed.

## 2020-10-27 NOTE — ANESTHESIA PREPROCEDURE EVALUATION
Relevant Problems   No relevant active problems       Anesthetic History   No history of anesthetic complications            Review of Systems / Medical History  Patient summary reviewed and pertinent labs reviewed    Pulmonary            Asthma        Neuro/Psych   Within defined limits           Cardiovascular    Hypertension              Exercise tolerance: >4 METS     GI/Hepatic/Renal  Within defined limits              Endo/Other  Within defined limits           Other Findings              Physical Exam    Airway  Mallampati: II  TM Distance: 4 - 6 cm  Neck ROM: normal range of motion   Mouth opening: Normal     Cardiovascular    Rhythm: regular  Rate: normal         Dental    Dentition: Lower partial plate and Upper dentition intact     Pulmonary  Breath sounds clear to auscultation               Abdominal  GI exam deferred       Other Findings            Anesthetic Plan    ASA: 2  Anesthesia type: MAC            Anesthetic plan and risks discussed with: Patient

## 2020-10-27 NOTE — ANESTHESIA POSTPROCEDURE EVALUATION
Procedure(s):  COLONOSCOPY.     MAC    Anesthesia Post Evaluation      Multimodal analgesia: multimodal analgesia not used between 6 hours prior to anesthesia start to PACU discharge  Patient location during evaluation: PACU  Patient participation: complete - patient participated  Level of consciousness: awake and alert  Pain management: adequate  Airway patency: patent  Anesthetic complications: no  Cardiovascular status: acceptable  Respiratory status: acceptable  Hydration status: acceptable  Post anesthesia nausea and vomiting:  none  Final Post Anesthesia Temperature Assessment:  Normothermia (36.0-37.5 degrees C)      INITIAL Post-op Vital signs:   Vitals Value Taken Time   /95 10/27/2020 11:05 AM   Temp 36.8 °C (98.3 °F) 10/27/2020 10:55 AM   Pulse 68 10/27/2020 11:05 AM   Resp 13 10/27/2020 11:05 AM   SpO2 94 % 10/27/2020 11:05 AM

## 2020-10-27 NOTE — H&P
G I Procedure Note           Endoscopy History and Physical               Dr. Lesvia Trevizo Office [address] [place] [address] [phone]  [id number]    1960  62 y.o.  male      Date of Procedure:   Preoperative Diagnosis:       Procedure:    10/27/2020           COLON SCREENING                              Procedure(s):  COLONOSCOPY      Gastroenterologist:  Anesthesia:           MD SARA Gilbert            History and procedure indication:  Negin Burrell is a 62 y.o. BLACK OR  male who presents with: COLON SCREENING   including the additional history of Screening ,Screening for colon cancer,,        Past Medical History:   Diagnosis Date    Hypertension       Prior to Admission medications    Medication Sig Start Date End Date Taking? Authorizing Provider   albuterol (PROVENTIL HFA, VENTOLIN HFA, PROAIR HFA) 90 mcg/actuation inhaler Take 1 Puff by inhalation every six (6) hours as needed for Wheezing or Shortness of Breath. 7/23/20  Yes Ny Friend., NP   hydroCHLOROthiazide (HYDRODIURIL) 25 mg tablet TAKE 1 TABLET BY MOUTH EVERY DAY FOR BLOOD PRESSURE 10/19/20   Ny Friend., NP   varenicline (CHANTIX STARTER KARLOS) 0.5 mg (11)- 1 mg (42) DsPk Per dose pack 7/23/20   Caridad OLSON, NP   tamsulosin (Flomax) 0.4 mg capsule Take 1 Cap by mouth daily. 7/23/20   Ny Friend., NP   nicotine (NICODERM CQ) 21 mg/24 hr APPLY 1 PATCH BY TRANSDERMAL ROUTE EVERY TWENTY-FOUR (24) HOURS FOR 30 DAYS. 7/22/20   Ny Friend., NP     No Known Allergies    Past Surgical History:   Procedure Laterality Date    HX HEENT      HX HERNIA REPAIR  8/2010    NONEMERG TRANSP MILEAGE       History reviewed. No pertinent family history.    Social History     Tobacco Use    Smoking status: Current Every Day Smoker Packs/day: 1.00    Smokeless tobacco: Never Used   Substance Use Topics    Alcohol use: No                                                      PHYSICAL EXAM   There were no vitals taken for this visit. General appearance:  alert, well appearing, and in no distress  Mental status:  normal mood, behavior, speech, dress, motor activity and thought processes  Nose:      normal and patent, no erythema, discharge or polyps  Mouth:- mucous membranes moist, pharynx normal without lesions                  [x]  No Loose teeth      []    Loose teeth  Finger opening:  []1     []1.5    [] 2     [] 2.5     [x] 3      [] 3.5     [] 4   Mallampati:         [] Class 1     [x] Class 2    [] Class 3      [] Class 4      Neck - supple,      [x] Full ROM [] Decreased ROM  [] Short Neck no significant adenopathy    Chest - clear to auscultation, no wheezes, rales or rhonchi, symmetric air entry  Heart: normal rate, regular rhythm, normal S1, S2, no murmurs, rubs, clicks or gallops  Abdomen: abdomen soft, bowel sounds  [x] normal  [] increased  [] hypoactive                       [] no tenderness  [] epigastric tenderness  [] LLQ tenderness   [] RLQ tenderness                      No masses, organomegaly or guarding. Rectal exam: negative without mass, lesions or tenderness  Extremities: peripheral pulses normal, no pedal edema, no clubbing or cyanosis  Neurologic: Alert and oriented to person, place, and time; normal strength and tone.                          Normal symmetric reflexes  Normal gait:                                      Assessement:                                 Pre op dx:  COLON SCREENING   Additional medical problems list below   Patient Active Problem List   Diagnosis Code    Pharyngitis, acute J02.9    Hypertension I10                                                                                           This note documentation was performed prior to this planned procedure       after a history and physical was performed in the office. Date: 9 29 2020   Office exam   10/27/2020 Immediate update no changes in H&P                        Pre Procedure Evaluation (per anesthesia or per h&p)                                                Sedation/Assessment:                                                                                               Mallampati Classification                            []Class 1                    []Class 2                    [] Class 3                  [] Class 4                                              ASA classfication         []     Class I: Normally healthy         []     Class II: Patient with mild systemic disease (e.g. hypertension)         []     Class III: Patient with severe systemic disease (e.g. CHF), non-decompensated         []     Class IV: Patient with severe systemic disease, decompensated         []     Class V: Moribund patient, survival unlikely                     Plan:  []  Egd                                 [x] Colonoscopy                               [] with Moderate Sedation /Conscious Sedation                                 [x] MAC          Patient stable for planned procedure. See orders.      Kailee Flower MD

## 2020-10-27 NOTE — DISCHARGE INSTRUCTIONS
Endoscopy Discharge Instructions     Dr. Tiffanie Araiza office                                            NAME: Tenzin Forrest RECORD PQQLDO:[id number]    AGE:  62 y.o. YOB: 1960                                                              FINAL Discharge Procedure and Diagnosis:       Procedure(s):  COLONOSCOPY       FINDINGS:     Poor prep   Internal hemorrhoids  Repeat test 1 year  Yearly stools for occult blood                                         MEDICATIONS    [x] CONTINUE CURRENT MEDICATIONS     [] NEW MEDICATIONS           1.    2.    3.         Testing   Schedule              Colonoscopy Screening                                   Recommendations       [x]  Repeat colonoscopy in  12 month 2nd        to Inadequate  prep    []  Repeat colonoscopy in 3 years    []  Repeat colonoscopy in 5 years    []  Repeat colonoscopy in 10 years         New additional  Tests  Call the office   [phone]) for the appointment time      []      []      []                                     YOUR NEXT APPOINTMENT WITH DR Daniel Han:                                                                                                                                [x]   None follow up with pcp   []  1 week       []   2 week    []  1 month    Always keep KEEP  APPOINTMENT WITH  Waldo Grimes NP for regular medical follow up                                                                                                                         If you had a colonoscopy the \"C\" indicates specific instructions        x                                           Diet Instructions :   Ordinarily you may resume your previous diet but your initial diet should be       Light your discharge nurse will go over this with you. Large meals can cause  abdominal discomfort after these procedures. Specific Diet Recommendations:        [x] High fiber diet. https://www.Texere/. com/diets/        [] GERD diet: avoid fried and fatty foods, peppermint, chocolate, alcohol,               coffee, citrus fruits and juices, and tomato products. Avoid lying down for            2 to 3  hours after eating. https://www.navarrete.com/. com/diets/            []  FODMAP DIET  DeathUnit.nl              []  All diets eg high fiber, gastroparesis. , weight loss , gluten free             1. [employer]Power              2.  https://www.Voz.io. ViaCyte/diets/           __x__  Amy Stabs may feel quite tired and need to rest and recuperate for several hours    following these procedures. __x__  Due to the fact that sedation was administered for this procedure, do not drive,   operate machinery or sign legal documents for the next 24 hours. __x__  Mild abdominal pain may be experienced after your procedure, but is should   disappear after several hours. Notify your physician if you have persistent pain,   tenderness or abdominal distension. __x__  C    Many patients for the first few hours following the exam may experience         belching or passing gas through the rectum. Walking may help to relieve        distention and gas pains. A warm bath or shower will often help with abdominal  cramping.                                                                                            __x__   Amy Stabs may return to your normal routine tomorrow, according to how you feel        and depending on your doctors instructions. Be sure to call your doctor to make  an appointment for a post-surgery check-up on the date your doctor has   requested.       __x__ C     Rectal bleeding or spotting in small amounts may occur with the first bowel   movement following a colonoscopy or sigmoidoscopy. If a large amount of blood is noted call immediately     __x__  You may experience a numbness or lack of sensation in throat. If present, do not     eat or drink. Before eating, test your ability to drink with small sips of water. Y     You may try clear liquids or soups. If you tolerate these, you may then eat solid     food which is not greasy or spicy. __x__ C     IF POLYPS REMOVED: Avoid any blood thinning medication such as plavix,   aspirin or coumadin  NSAIDS (like advil or alleve) for 7 days. __x__  Notify your physician if you cough or vomit blood or experience chest pain. Your biopsy or testing result should be available in 7-10 days                                                                                                                      Prescription will be electronically sent to your pharmacy you must     let your nurse know your pharmacy:                                                                                                                                          [address] [place] [address]. TO HELP ENSURE A SMOOTH RECOVERY,       IT IS IMPORTANT TO FOLLOW THEM. _x___Pamphlet /Educational Information provided for diagnostic findings     Additional education information can assessed at the sites below:   Mac   http://www.digestive. niddk.nih.gov/ddiseases/a-z.asp      Web MD patient information                                                                                                Signature of individual given instructions :   Date: 10/27/2020

## 2021-01-26 RX ORDER — HYDROCHLOROTHIAZIDE 25 MG/1
TABLET ORAL
Qty: 90 TAB | Refills: 0 | Status: SHIPPED | OUTPATIENT
Start: 2021-01-26 | End: 2021-04-26

## 2021-04-26 RX ORDER — HYDROCHLOROTHIAZIDE 25 MG/1
TABLET ORAL
Qty: 90 TAB | Refills: 0 | Status: SHIPPED | OUTPATIENT
Start: 2021-04-26 | End: 2021-05-20 | Stop reason: SINTOL

## 2021-05-20 ENCOUNTER — OFFICE VISIT (OUTPATIENT)
Dept: INTERNAL MEDICINE CLINIC | Age: 61
End: 2021-05-20
Payer: COMMERCIAL

## 2021-05-20 VITALS
TEMPERATURE: 97.3 F | DIASTOLIC BLOOD PRESSURE: 97 MMHG | RESPIRATION RATE: 16 BRPM | HEART RATE: 75 BPM | WEIGHT: 187 LBS | HEIGHT: 69 IN | OXYGEN SATURATION: 96 % | BODY MASS INDEX: 27.7 KG/M2 | SYSTOLIC BLOOD PRESSURE: 137 MMHG

## 2021-05-20 DIAGNOSIS — R35.0 URINARY FREQUENCY: Primary | ICD-10-CM

## 2021-05-20 DIAGNOSIS — I10 ESSENTIAL HYPERTENSION: ICD-10-CM

## 2021-05-20 DIAGNOSIS — G47.00 INSOMNIA, UNSPECIFIED TYPE: ICD-10-CM

## 2021-05-20 DIAGNOSIS — F17.200 SMOKER: ICD-10-CM

## 2021-05-20 LAB
BILIRUB UR QL STRIP: NORMAL
GLUCOSE UR-MCNC: NEGATIVE MG/DL
KETONES P FAST UR STRIP-MCNC: NORMAL MG/DL
PH UR STRIP: 5.5 [PH] (ref 4.6–8)
PROT UR QL STRIP: NORMAL
SP GR UR STRIP: 1.03 (ref 1–1.03)
UA UROBILINOGEN AMB POC: NORMAL (ref 0.2–1)
URINALYSIS CLARITY POC: CLEAR
URINALYSIS COLOR POC: YELLOW
URINE BLOOD POC: NORMAL
URINE LEUKOCYTES POC: NEGATIVE
URINE NITRITES POC: NEGATIVE

## 2021-05-20 PROCEDURE — 81003 URINALYSIS AUTO W/O SCOPE: CPT | Performed by: NURSE PRACTITIONER

## 2021-05-20 PROCEDURE — 99214 OFFICE O/P EST MOD 30 MIN: CPT | Performed by: NURSE PRACTITIONER

## 2021-05-20 RX ORDER — ALBUTEROL SULFATE 90 UG/1
1 AEROSOL, METERED RESPIRATORY (INHALATION)
Qty: 1 INHALER | Refills: 0 | Status: SHIPPED | OUTPATIENT
Start: 2021-05-20 | End: 2022-03-17 | Stop reason: SDUPTHER

## 2021-05-20 RX ORDER — AMLODIPINE BESYLATE 10 MG/1
10 TABLET ORAL DAILY
Qty: 90 TABLET | Refills: 0 | Status: SHIPPED | OUTPATIENT
Start: 2021-05-20 | End: 2021-09-13

## 2021-05-20 NOTE — PROGRESS NOTES
Chief Complaint   Patient presents with    Follow-up     BP, urinary frequency     Sleep Problem     pt states he wakes up every hour, does not want to try medication     Hand Problem     pt reports bumps/ callus in left palm       1. Have you been to the ER, urgent care clinic since your last visit? Hospitalized since your last visit? No    2. Have you seen or consulted any other health care providers outside of the [address] [place] [address] since your last visit? Include any pap smears or colon screening.  No

## 2021-05-20 NOTE — PATIENT INSTRUCTIONS
For sleep try melatonin (no more than 5mg)    Stop hydrochlorothiazide and start amlodipine for blood pressure to see if this improves your urine symptoms. Learning About Sleeping Well  What does sleeping well mean? Sleeping well means getting enough sleep. How much sleep is enough varies among people. The number of hours you sleep is not as important as how you feel when you wake up. If you do not feel refreshed, you probably need more sleep. Another sign of not getting enough sleep is feeling tired during the day. The average total nightly sleep time is 7½ to 8 hours. Healthy adults may need a little more or a little less than this. Why is getting enough sleep important? Getting enough quality sleep is a basic part of good health. When your sleep suffers, your mood and your thoughts can suffer too. You may find yourself feeling more grumpy or stressed. Not getting enough sleep also can lead to serious problems, including injury, accidents, anxiety, and depression. What might cause poor sleeping? Many things can cause sleep problems, including:  · Stress. Stress can be caused by fear about a single event, such as giving a speech. Or you may have ongoing stress, such as worry about work or school. · Depression, anxiety, and other mental or emotional conditions. · Changes in your sleep habits or surroundings. This includes changes that happen where you sleep, such as noise, light, or sleeping in a different bed. It also includes changes in your sleep pattern, such as having jet lag or working a late shift. · Health problems, such as pain, breathing problems, and restless legs syndrome. · Lack of regular exercise. How can you help yourself? Here are some tips that may help you sleep more soundly and wake up feeling more refreshed. Your sleeping area   · Use your bedroom only for sleeping and sex. A bit of light reading may help you fall asleep.  But if it doesn't, do your reading elsewhere in the house. Don't watch TV in bed. · Be sure your bed is big enough to stretch out comfortably, especially if you have a sleep partner. · Keep your bedroom quiet, dark, and cool. Use curtains, blinds, or a sleep mask to block out light. To block out noise, use earplugs, soothing music, or a \"white noise\" machine. Your evening and bedtime routine   · Create a relaxing bedtime routine. You might want to take a warm shower or bath, listen to soothing music, or drink a cup of noncaffeinated tea. · Go to bed at the same time every night. And get up at the same time every morning, even if you feel tired. What to avoid   · Limit caffeine (coffee, tea, caffeinated sodas) during the day, and don't have any for at least 4 to 6 hours before bedtime. · Don't drink alcohol before bedtime. Alcohol can cause you to wake up more often during the night. · Don't smoke or use tobacco, especially in the evening. Nicotine can keep you awake. · Don't take naps during the day, especially close to bedtime. · Don't lie in bed awake for too long. If you can't fall asleep, or if you wake up in the middle of the night and can't get back to sleep within 15 minutes or so, get out of bed and go to another room until you feel sleepy. · Don't take medicine right before bed that may keep you awake or make you feel hyper or energized. Your doctor can tell you if your medicine may do this and if you can take it earlier in the day. If you can't sleep   · Imagine yourself in a peaceful, pleasant scene. Focus on the details and feelings of being in a place that is relaxing. · Get up and do a quiet or boring activity until you feel sleepy. · Don't drink any liquids after 6 p.m. if you wake up often because you have to go to the bathroom. Where can you learn more? Go to http://www.gray.com/  Enter G926 in the search box to learn more about \"Learning About Sleeping Well. \"  Current as of: September 23, 2020               Content Version: 12.8  © 3071-2845 Healthwise, Incorporated. Care instructions adapted under license by [employer] (which disclaims liability or warranty for this information). If you have questions about a medical condition or this instruction, always ask your healthcare professional. [address] any warranty or liability for your use of this information.

## 2021-05-20 NOTE — PROGRESS NOTES
Subjective: (As above and below)     Chief Complaint   Patient presents with    Follow-up     BP, urinary frequency     Sleep Problem     pt states he wakes up every hour, does not want to try medication     Hand Problem     pt reports bumps/ callus in left palm     Chelsea Catalan is a 62y.o. year old male who presents for     Hypertension ROS:  taking medications as instructed, no medication side effects noted, no TIAs, no chest pain on exertion, no dyspnea on exertion, no swelling of ankles    BP Readings from Last 3 Encounters:   05/20/21 (!) 137/97   10/27/20 120/88   07/23/20 130/90       Smoking: chantix caused nightmares, he wants to quit, willing to try patches again    Urinary frequency: no dribbling, he does void 1-3 x per night  Last PSA normal, he was previously on flomax but forgot about this and does not recall if it helped  He is on HCTZ which he takes during the day  No late caffeine/fluids  No constipation    Skin problem: hard bump to left palm- unchanged for >1 year, not painful    Insomnia: trouble staying asleep, sometimes wakes to void but not always  Denies late meals  He does looks at screens before bed  No sleep apnea s/s  He is not keen on trying medication for this              Reviewed PmHx, RxHx, FmHx, SocHx, AllgHx and updated in chart. History reviewed. No pertinent family history. Past Medical History:   Diagnosis Date    Hypertension       Social History     Socioeconomic History    Marital status:      Spouse name: Not on file    Number of children: 1    Years of education: Not on file    Highest education level: Not on file   Occupational History    Occupation: [employer]     Employer: [employer]     Comment: s.    Tobacco Use    Smoking status: Current Every Day Smoker     Packs/day: 1.00    Smokeless tobacco: Never Used   Vaping Use    Vaping Use: Never used   Substance and Sexual Activity    Alcohol use: No    Drug use: No    Sexual activity: Not Currently     Partners: Female   Social History Narrative    Lives with girlfriend, has one son, age 15, lives with mother. Works at [employer] for 3 years     Social Determinants of Health     Financial Resource Strain:     Difficulty of Paying Living Expenses:    Food Insecurity:     Worried About [address] in the Last Year:    [address] in the Last Year:    Transportation Needs:     Lack of Transportation (Medical):  Lack of Transportation (Non-Medical):    Physical Activity:     Days of Exercise per Week:     Minutes of Exercise per Session:    Stress:     Feeling of Stress :    Social Connections:     Frequency of Communication with Friends and Family:     Frequency of Social Gatherings with Friends and Family:     Attends Evangelical Services:     Active Member of Clubs or Organizations:     Attends Club or Organization Meetings:     Marital Status:           Current Outpatient Medications   Medication Sig    albuterol (PROVENTIL HFA, VENTOLIN HFA, PROAIR HFA) 90 mcg/actuation inhaler Take 1 Puff by inhalation every six (6) hours as needed for Wheezing or Shortness of Breath.  hydroCHLOROthiazide (HYDRODIURIL) 25 mg tablet TAKE 1 TABLET BY MOUTH EVERY DAY FOR BLOOD PRESSURE    varenicline (CHANTIX STARTER KARLOS) 0.5 mg (11)- 1 mg (42) DsPk Per dose pack (Patient not taking: Reported on 5/20/2021)    tamsulosin (Flomax) 0.4 mg capsule Take 1 Cap by mouth daily. (Patient not taking: Reported on 5/20/2021)    nicotine (NICODERM CQ) 21 mg/24 hr APPLY 1 PATCH BY TRANSDERMAL ROUTE EVERY TWENTY-FOUR (24) HOURS FOR 30 DAYS. (Patient not taking: Reported on 5/20/2021)     No current facility-administered medications for this visit. Review of Systems:   Constitutional:    Negative for fever and chills, negative diaphoresis. HEENT:              Negative for neck pain and stiffness. Eyes:                  Negative for visual disturbance, itching, redness or discharge. Respiratory:        Negative for cough and shortness of breath. Cardiovascular:  Negative for chest pain and palpitations. Gastrointestinal: Negative for nausea, vomiting, abdominal pain, diarrhea or constipation. Genitourinary:     Negative for dysuria and frequency. Musculoskeletal: Negative for falls, tenderness and swelling. Skin:                    Negative for rash, masses or lesions. Neurological:       Negative for dizzyness, seizure, loss of consciousness, weakness and numbness. Objective:     Vitals:    05/20/21 1057   BP: (!) 137/97   Pulse: 75   Resp: 16   Temp: 97.3 °F (36.3 °C)   TempSrc: Temporal   SpO2: 96%   Weight: 187 lb (84.8 kg)   Height: 5' 9\" (1.753 m)       Results for orders placed or performed in visit on 05/20/21   AMB POC URINALYSIS DIP STICK AUTO W/O MICRO   Result Value Ref Range    Color (UA POC) Yellow     Clarity (UA POC) Clear     Glucose (UA POC) Negative Negative    Bilirubin (UA POC) 1+ Negative    Ketones (UA POC) Trace Negative    Specific gravity (UA POC) 1.030 1.001 - 1.035    Blood (UA POC) Trace Negative    pH (UA POC) 5.5 4.6 - 8.0    Protein (UA POC) 1+ Negative    Urobilinogen (UA POC) 0.2 mg/dL 0.2 - 1    Nitrites (UA POC) Negative Negative    Leukocyte esterase (UA POC) Negative Negative         Gen: Oriented to person, place and time and well-developed, well-nourished and in no distress. HEENT:    Head: normocephalic and atraumatic. Eyes:  EOM are normal. Pupils equal and round. Neck:  Normal range of motion. Neck supple. Cardiovascular: normal rate, regular rhythm and normal heart sounds. Pulmonary/Chest:  Effort normal and breath sounds normal.  No respiratory distress. No wheezes, no rales. Abdominal: soft, normal  bowel sounds. Musculoskeletal:  No edema, no tenderness. No calf tenderness or edema. Neurological:  Alert, oriented to person, place and time. Skin: skin is warm and dry.    Skin concern on left palm feels like a deep callous, not painful        Assessment/ Plan:       1. Urinary frequency    - AMB POC URINALYSIS DIP STICK AUTO W/O MICRO    2. Smoker    - nicotine (NICODERM CQ) 21 mg/24 hr; APPLY 1 PATCH BY TRANSDERMAL ROUTE EVERY TWENTY-FOUR (24) HOURS FOR 30 DAYS. Dispense: 28 Patch; Refill: 0    3. Essential hypertension  Change BP meds to see if this helps w/ urinary frequency  INI can resume flomax and refer to urology    - amLODIPine (NORVASC) 10 mg tablet; Take 1 Tablet by mouth daily. For blood pressure  Dispense: 90 Tablet; Refill: 0    4. Insomnia, unspecified type  Sleep hygiene discussed, recc try melatonin        I have discussed the diagnosis with the patient and the intended plan as seen in the above orders. The patient has received an after-visit summary and questions were answered concerning future plans. Pt conveyed understanding of plan. Medication Side Effects and Warnings were discussed with patient: yes  Patient Labs were reviewed: yes  Patient Past Records were reviewed:  yes    Crecencio Gosselin.  Flora Naidu NP

## 2021-05-21 RX ORDER — IBUPROFEN 200 MG
TABLET ORAL
Qty: 28 PATCH | Refills: 0 | Status: SHIPPED | OUTPATIENT
Start: 2021-05-21

## 2021-06-03 ENCOUNTER — CLINICAL SUPPORT (OUTPATIENT)
Dept: INTERNAL MEDICINE CLINIC | Age: 61
End: 2021-06-03

## 2021-06-03 VITALS
RESPIRATION RATE: 16 BRPM | BODY MASS INDEX: 28.14 KG/M2 | HEART RATE: 81 BPM | HEIGHT: 69 IN | SYSTOLIC BLOOD PRESSURE: 138 MMHG | TEMPERATURE: 98.2 F | WEIGHT: 190 LBS | OXYGEN SATURATION: 91 % | DIASTOLIC BLOOD PRESSURE: 94 MMHG

## 2021-06-03 DIAGNOSIS — Z01.30 BP CHECK: Primary | ICD-10-CM

## 2021-06-03 NOTE — PROGRESS NOTES
Chief Complaint   Patient presents with    Blood Pressure Check       Visit Vitals  BP (!) 138/94 (BP 1 Location: Left arm, BP Patient Position: Sitting, BP Cuff Size: Adult)   Pulse 81   Temp 98.2 °F (36.8 °C) (Temporal)   Resp 16   Ht 5' 9\" (1.753 m)   Wt 190 lb (86.2 kg)   SpO2 91%   BMI 28.06 kg/m²       Provider notified, pt states he feels like he is urinating less but still waking up at night. Pt to continue Amlodipine and hold Flomax, f/u if urinary symptoms are not better. Pt will try Melatonin for sleep problem.

## 2021-06-17 ENCOUNTER — TELEPHONE (OUTPATIENT)
Dept: INTERNAL MEDICINE CLINIC | Age: 61
End: 2021-06-17

## 2021-06-17 NOTE — TELEPHONE ENCOUNTER
Called to see if he needed a lower dose of nicotine patch since I got a refill request  He says he does not need refills at this time

## 2021-09-11 DIAGNOSIS — I10 ESSENTIAL HYPERTENSION: ICD-10-CM

## 2021-09-13 RX ORDER — AMLODIPINE BESYLATE 10 MG/1
10 TABLET ORAL DAILY
Qty: 90 TABLET | Refills: 0 | Status: SHIPPED | OUTPATIENT
Start: 2021-09-13 | End: 2021-12-16

## 2021-12-16 DIAGNOSIS — I10 ESSENTIAL HYPERTENSION: ICD-10-CM

## 2021-12-16 RX ORDER — AMLODIPINE BESYLATE 10 MG/1
10 TABLET ORAL DAILY
Qty: 90 TABLET | Refills: 0 | Status: SHIPPED | OUTPATIENT
Start: 2021-12-16 | End: 2022-03-14

## 2021-12-17 RX ORDER — TIOTROPIUM BROMIDE 18 UG/1
CAPSULE ORAL; RESPIRATORY (INHALATION)
Qty: 90 CAPSULE | Refills: 1 | Status: SHIPPED | OUTPATIENT
Start: 2021-12-17 | End: 2022-03-17

## 2022-03-13 DIAGNOSIS — I10 ESSENTIAL HYPERTENSION: ICD-10-CM

## 2022-03-14 RX ORDER — AMLODIPINE BESYLATE 10 MG/1
TABLET ORAL
Qty: 90 TABLET | Refills: 0 | Status: SHIPPED | OUTPATIENT
Start: 2022-03-14 | End: 2022-03-17 | Stop reason: SDUPTHER

## 2022-03-17 ENCOUNTER — OFFICE VISIT (OUTPATIENT)
Dept: INTERNAL MEDICINE CLINIC | Age: 62
End: 2022-03-17
Payer: COMMERCIAL

## 2022-03-17 VITALS
RESPIRATION RATE: 18 BRPM | SYSTOLIC BLOOD PRESSURE: 128 MMHG | BODY MASS INDEX: 28.29 KG/M2 | WEIGHT: 191 LBS | HEIGHT: 69 IN | OXYGEN SATURATION: 92 % | TEMPERATURE: 98.1 F | HEART RATE: 77 BPM | DIASTOLIC BLOOD PRESSURE: 87 MMHG

## 2022-03-17 DIAGNOSIS — R80.9 PROTEINURIA, UNSPECIFIED TYPE: ICD-10-CM

## 2022-03-17 DIAGNOSIS — F17.200 SMOKER: ICD-10-CM

## 2022-03-17 DIAGNOSIS — R35.0 URINARY FREQUENCY: ICD-10-CM

## 2022-03-17 DIAGNOSIS — I10 ESSENTIAL HYPERTENSION: Primary | ICD-10-CM

## 2022-03-17 DIAGNOSIS — L85.3 DRY SKIN: ICD-10-CM

## 2022-03-17 DIAGNOSIS — J43.1 PANLOBULAR EMPHYSEMA (HCC): ICD-10-CM

## 2022-03-17 PROBLEM — N40.1 LOWER URINARY TRACT SYMPTOMS DUE TO BENIGN PROSTATIC HYPERPLASIA: Status: ACTIVE | Noted: 2022-03-17

## 2022-03-17 LAB
BILIRUB UR QL STRIP: NEGATIVE
GLUCOSE UR-MCNC: NEGATIVE MG/DL
KETONES P FAST UR STRIP-MCNC: NEGATIVE MG/DL
PH UR STRIP: 5.5 [PH] (ref 4.6–8)
PROT UR QL STRIP: NORMAL
SP GR UR STRIP: 1.03 (ref 1–1.03)
UA UROBILINOGEN AMB POC: NORMAL (ref 0.2–1)
URINALYSIS CLARITY POC: CLEAR
URINALYSIS COLOR POC: YELLOW
URINE BLOOD POC: NORMAL
URINE LEUKOCYTES POC: NEGATIVE
URINE NITRITES POC: NEGATIVE

## 2022-03-17 PROCEDURE — 99214 OFFICE O/P EST MOD 30 MIN: CPT | Performed by: NURSE PRACTITIONER

## 2022-03-17 PROCEDURE — 81003 URINALYSIS AUTO W/O SCOPE: CPT | Performed by: NURSE PRACTITIONER

## 2022-03-17 RX ORDER — ALBUTEROL SULFATE 90 UG/1
1 AEROSOL, METERED RESPIRATORY (INHALATION)
Qty: 1 EACH | Refills: 1 | Status: SHIPPED | OUTPATIENT
Start: 2022-03-17 | End: 2022-03-17 | Stop reason: SDUPTHER

## 2022-03-17 RX ORDER — DOXEPIN HYDROCHLORIDE 10 MG/1
10 CAPSULE ORAL
Qty: 30 CAPSULE | Refills: 0 | Status: SHIPPED | OUTPATIENT
Start: 2022-03-17 | End: 2022-05-04

## 2022-03-17 RX ORDER — AMMONIUM LACTATE 5 %
LOTION (GRAM) TOPICAL AS NEEDED
Qty: 120 ML | Refills: 1 | Status: SHIPPED | OUTPATIENT
Start: 2022-03-17

## 2022-03-17 RX ORDER — HYDROCHLOROTHIAZIDE 25 MG/1
TABLET ORAL
COMMUNITY
End: 2022-03-17

## 2022-03-17 RX ORDER — FLUTICASONE PROPIONATE AND SALMETEROL XINAFOATE 230; 21 UG/1; UG/1
2 AEROSOL, METERED RESPIRATORY (INHALATION) 2 TIMES DAILY
Qty: 1 EACH | Refills: 1 | Status: SHIPPED | OUTPATIENT
Start: 2022-03-17 | End: 2022-09-07 | Stop reason: SDUPTHER

## 2022-03-17 RX ORDER — TAMSULOSIN HYDROCHLORIDE 0.4 MG/1
0.4 CAPSULE ORAL DAILY
Qty: 30 CAPSULE | Refills: 0 | Status: SHIPPED | OUTPATIENT
Start: 2022-03-17 | End: 2022-04-11

## 2022-03-17 RX ORDER — ALBUTEROL SULFATE 90 UG/1
1 AEROSOL, METERED RESPIRATORY (INHALATION)
Qty: 1 EACH | Refills: 1 | Status: SHIPPED | OUTPATIENT
Start: 2022-03-17 | End: 2022-09-29 | Stop reason: SDUPTHER

## 2022-03-17 RX ORDER — AMLODIPINE BESYLATE 10 MG/1
TABLET ORAL
Qty: 90 TABLET | Refills: 1 | Status: SHIPPED | OUTPATIENT
Start: 2022-03-17 | End: 2022-09-29 | Stop reason: SDUPTHER

## 2022-03-17 NOTE — PROGRESS NOTES
Subjective: (As above and below)     Chief Complaint   Patient presents with    Follow-up     pt states sprivia does not help would like to try albuterol inhaler again     Skin Problem     itching and burning right leg, pt states once he starts scratching he cannot stop    Sleep Problem     pt states he is unable to stay sleep, wakes up every 2 hours      Eloina Seen is a 62y.o. year old male who presents for     Hypertension ROS:  taking medications as instructed, no medication side effects noted, no TIAs, no chest pain on exertion, no dyspnea on exertion, no swelling of ankles    BP Readings from Last 3 Encounters:   03/17/22 128/87   06/03/21 (!) 138/94   05/20/21 (!) 137/97       Smoker; 1 ppd, has not been able to quit    Emphysema; saw pulm, spiriva has not helped    Skin problem; dry skin, no rash    Sleep problem:    LUTS: on flomax      Reviewed PmHx, RxHx, FmHx, SocHx, AllgHx and updated in chart. History reviewed. No pertinent family history. Past Medical History:   Diagnosis Date    Hypertension       Social History     Socioeconomic History    Marital status:     Number of children: 1   Occupational History    Occupation: [employer]     Employer: [employer]     Comment: s. Tobacco Use    Smoking status: Current Every Day Smoker     Packs/day: 1.00    Smokeless tobacco: Never Used   Vaping Use    Vaping Use: Never used   Substance and Sexual Activity    Alcohol use: No    Drug use: No    Sexual activity: Not Currently     Partners: Female   Social History Narrative    Lives with girlfriend, has one son, age 15, lives with mother. Works at [employer] for 3 years          Current Outpatient Medications   Medication Sig    amLODIPine (NORVASC) 10 mg tablet TAKE 1 TABLET BY MOUTH EVERY DAY FOR BLOOD PRESSURE    ammonium lactate (Lac-Hydrin Five) 5 % lotion Apply  to affected area as needed (dry skin).     fluticasone propion-salmeteroL (Advair HFA) 230-21 mcg/actuation inhaler Take 2 Puffs by inhalation two (2) times a day.  tamsulosin (Flomax) 0.4 mg capsule Take 1 Capsule by mouth daily.  albuterol (PROVENTIL HFA, VENTOLIN HFA, PROAIR HFA) 90 mcg/actuation inhaler Take 1 Puff by inhalation every six (6) hours as needed for Wheezing or Shortness of Breath.  doxepin (SINEquan) 10 mg capsule Take 1 Capsule by mouth nightly as needed (sleep).  nicotine (NICODERM CQ) 21 mg/24 hr APPLY 1 PATCH BY TRANSDERMAL ROUTE EVERY TWENTY-FOUR (24) HOURS FOR 30 DAYS. No current facility-administered medications for this visit. Review of Systems:   Constitutional:    Negative for fever and chills, negative diaphoresis. HEENT:              Negative for neck pain and stiffness. Eyes:                  Negative for visual disturbance, itching, redness or discharge. Respiratory:        Negative for cough and shortness of breath. Cardiovascular:  Negative for chest pain and palpitations. Gastrointestinal: Negative for nausea, vomiting, abdominal pain, diarrhea or constipation. Genitourinary:     Negative for dysuria and frequency. Musculoskeletal: Negative for falls, tenderness and swelling. Skin:                    Negative for rash, masses or lesions. Neurological:       Negative for dizzyness, seizure, loss of consciousness, weakness and numbness. Objective:     Vitals:    03/17/22 1453 03/17/22 1525   BP: (!) 142/95 128/87   Pulse: 85 77   Resp: 18    Temp: 98.1 °F (36.7 °C)    TempSrc: Temporal    SpO2: 92%    Weight: 191 lb (86.6 kg)    Height: 5' 9\" (1.753 m)        Gen: Oriented to person, place and time and well-developed, well-nourished and in no distress. HEENT:    Head: normocephalic and atraumatic. Eyes:  EOM are normal. Pupils equal and round. Neck:  Normal range of motion. Neck supple. Cardiovascular: normal rate, regular rhythm and normal heart sounds.    Pulmonary/Chest:  Effort normal and breath sounds normal.  No respiratory distress. No wheezes, no rales. Abdominal: soft, normal  bowel sounds. Musculoskeletal:  No edema, no tenderness. No calf tenderness or edema. Neurological:  Alert, oriented to person, place and time. Skin: skin is warm and dry. Assessment/ Plan:     Follow-up and Dispositions    · Return in about 6 months (around 9/17/2022) for bp. 1. Essential hypertension    - METABOLIC PANEL, COMPREHENSIVE; Future  - CBC W/O DIFF; Future  - LIPID PANEL; Future  - amLODIPine (NORVASC) 10 mg tablet; TAKE 1 TABLET BY MOUTH EVERY DAY FOR BLOOD PRESSURE  Dispense: 90 Tablet; Refill: 1    2. Smoker    - CT LOW DOSE LUNG CANCER SCREENING; Future    3. Panlobular emphysema (HCC)    - albuterol (PROVENTIL HFA, VENTOLIN HFA, PROAIR HFA) 90 mcg/actuation inhaler; Take 1 Puff by inhalation every six (6) hours as needed for Wheezing or Shortness of Breath. Dispense: 1 Each; Refill: 1    4. Proteinuria, unspecified type    - AMB POC URINALYSIS DIP STICK AUTO W/O MICRO    5. Dry skin  Lac hydrin    6. Urinary frequency   tamsulosin (Flomax) 0.4 mg capsule; Take 1 Capsule by mouth daily. Dispense: 30 Capsule; Refill: 0      I have discussed the diagnosis with the patient and the intended plan as seen in the above orders. The patient has received an after-visit summary and questions were answered concerning future plans. Pt conveyed understanding of plan. Medication Side Effects and Warnings were discussed with patient: yes  Patient Labs were reviewed: yes  Patient Past Records were reviewed:  yes    Maggie Choudhary.  Kurt Trejo NP

## 2022-03-17 NOTE — PATIENT INSTRUCTIONS
Learning About Sleeping Well  What does sleeping well mean? Sleeping well means getting enough sleep to feel good and stay healthy. How much sleep is enough varies among people. The number of hours you sleep and how you feel when you wake up are both important. If you do not feel refreshed, you probably need more sleep. Another sign of not getting enough sleep is feeling tired during the day. Experts recommend that adults get at least 7 or more hours of sleep per day. Children and older adults need more sleep. Why is getting enough sleep important? Getting enough quality sleep is a basic part of good health. When your sleep suffers, your physical health, mood, and your thoughts can suffer too. You may find yourself feeling more grumpy or stressed. Not getting enough sleep also can lead to serious problems, including injury, accidents, anxiety, and depression. What might cause poor sleeping? Many things can cause sleep problems, including:  · Changes to your sleep schedule. · Stress. Stress can be caused by fear about a single event, such as giving a speech. Or you may have ongoing stress, such as worry about work or school. · Depression, anxiety, and other mental or emotional conditions. · Changes in your sleep habits or surroundings. This includes changes that happen where you sleep, such as noise, light, or sleeping in a different bed. It also includes changes in your sleep pattern, such as having jet lag or working a late shift. · Health problems, such as pain, breathing problems, and restless legs syndrome. · Lack of regular exercise. · Using alcohol, nicotine, or caffeine before bed. How can you help yourself? Here are some tips that may help you sleep more soundly and wake up feeling more refreshed. Your sleeping area   · Use your bedroom only for sleeping and sex. A bit of light reading may help you fall asleep. But if it doesn't, do your reading elsewhere in the house.  Try not to use your TV, computer, smartphone, or tablet while you are in bed. · Be sure your bed is big enough to stretch out comfortably, especially if you have a sleep partner. · Keep your bedroom quiet, dark, and cool. Use curtains, blinds, or a sleep mask to block out light. To block out noise, use earplugs, soothing music, or a \"white noise\" machine. Your evening and bedtime routine   · Create a relaxing bedtime routine. You might want to take a warm shower or bath, or listen to soothing music. · Go to bed at the same time every night. And get up at the same time every morning, even if you feel tired. What to avoid   · Limit caffeine (coffee, tea, caffeinated sodas) during the day, and don't have any for at least 6 hours before bedtime. · Avoid drinking alcohol before bedtime. Alcohol can cause you to wake up more often during the night. · Try not to smoke or use tobacco, especially in the evening. Nicotine can keep you awake. · Limit naps during the day, especially close to bedtime. · Avoid lying in bed awake for too long. If you can't fall asleep or if you wake up in the middle of the night and can't get back to sleep within about 20 minutes, get out of bed and go to another room until you feel sleepy. · Avoid taking medicine right before bed that may keep you awake or make you feel hyper or energized. Your doctor can tell you if your medicine may do this and if you can take it earlier in the day. If you can't sleep   · Imagine yourself in a peaceful, pleasant scene. Focus on the details and feelings of being in a place that is relaxing. · Get up and do a quiet or boring activity until you feel sleepy. · Avoid drinking any liquids before going to bed to help prevent waking up often to use the bathroom. Where can you learn more? Go to http://www.gray.com/  Enter G048 in the search box to learn more about \"Learning About Sleeping Well. \"  Current as of: June 16, 2021               Content Version: 13.2  © 4443-0980 Healthwise, Incorporated. Care instructions adapted under license by [employer] (which disclaims liability or warranty for this information). If you have questions about a medical condition or this instruction, always ask your healthcare professional. [address] any warranty or liability for your use of this information.

## 2022-03-17 NOTE — PROGRESS NOTES
Chief Complaint   Patient presents with    Follow-up     pt states Clive Beltran does not help would like to try albuterol inhaler again     Skin Problem     itching and burning right leg, pt states once he starts scratching he cannot stop    Sleep Problem     pt states he is unable to stay sleep, wakes up every 2 hours        1. \"Have you been to the ER, urgent care clinic since your last visit? Hospitalized since your last visit? \" No    2. \"Have you seen or consulted any other health care providers outside of the [address] [place] [address] since your last visit? \" No     3. For patients aged 39-70: Has the patient had a colonoscopy / FIT/ Cologuard? Yes - Care Gap present. Most recent result on file      If the patient is female:    4. For patients aged 41-77: Has the patient had a mammogram within the past 2 years? NA - based on age or sex      11. For patients aged 21-65: Has the patient had a pap smear?  NA - based on age or sex

## 2022-03-24 PROBLEM — N40.1 LOWER URINARY TRACT SYMPTOMS DUE TO BENIGN PROSTATIC HYPERPLASIA: Status: ACTIVE | Noted: 2022-03-17

## 2022-05-04 RX ORDER — DOXEPIN HYDROCHLORIDE 10 MG/1
10 CAPSULE ORAL
Qty: 30 CAPSULE | Refills: 0 | Status: SHIPPED | OUTPATIENT
Start: 2022-05-04 | End: 2022-05-31 | Stop reason: SDUPTHER

## 2022-05-31 RX ORDER — DOXEPIN HYDROCHLORIDE 10 MG/1
10 CAPSULE ORAL
Qty: 90 CAPSULE | Refills: 0 | Status: SHIPPED | OUTPATIENT
Start: 2022-05-31

## 2022-08-08 ENCOUNTER — HOSPITAL ENCOUNTER (EMERGENCY)
Age: 62
Discharge: HOME | End: 2022-08-08
Attending: EMERGENCY MEDICINE
Payer: COMMERCIAL

## 2022-08-08 ENCOUNTER — NURSE TRIAGE (OUTPATIENT)
Dept: OTHER | Facility: CLINIC | Age: 62
End: 2022-08-08

## 2022-08-08 ENCOUNTER — APPOINTMENT (OUTPATIENT)
Dept: GENERAL RADIOLOGY | Age: 62
End: 2022-08-08
Attending: EMERGENCY MEDICINE
Payer: COMMERCIAL

## 2022-08-08 VITALS
OXYGEN SATURATION: 93 % | BODY MASS INDEX: 28.21 KG/M2 | SYSTOLIC BLOOD PRESSURE: 147 MMHG | HEART RATE: 92 BPM | TEMPERATURE: 99.2 F | DIASTOLIC BLOOD PRESSURE: 90 MMHG | WEIGHT: 191 LBS | RESPIRATION RATE: 16 BRPM

## 2022-08-08 DIAGNOSIS — R20.2 PARESTHESIAS IN LEFT HAND: Primary | ICD-10-CM

## 2022-08-08 PROCEDURE — 93005 ELECTROCARDIOGRAM TRACING: CPT

## 2022-08-08 PROCEDURE — 99284 EMERGENCY DEPT VISIT MOD MDM: CPT

## 2022-08-08 PROCEDURE — 71045 X-RAY EXAM CHEST 1 VIEW: CPT

## 2022-08-08 RX ORDER — METHOCARBAMOL 750 MG/1
750 TABLET, FILM COATED ORAL
Qty: 21 TABLET | Refills: 0 | Status: SHIPPED | OUTPATIENT
Start: 2022-08-08 | End: 2022-09-07

## 2022-08-08 RX ORDER — METHYLPREDNISOLONE 4 MG/1
TABLET ORAL
Qty: 1 DOSE PACK | Refills: 0 | Status: SHIPPED | OUTPATIENT
Start: 2022-08-08 | End: 2022-09-07

## 2022-08-08 NOTE — DISCHARGE INSTRUCTIONS
You are seen in the ER for your symptoms. Please use the muscle relaxers and use the steroids to help treat a potential trapped nerve. Please follow-up with your primary care doctor and the neurologist.  Call 911 and return to the nearest ER if your symptoms worsen as these could be signs of a stroke.

## 2022-08-08 NOTE — ED PROVIDER NOTES
EMERGENCY DEPARTMENT HISTORY AND PHYSICAL EXAM      Date: 8/8/2022  Patient Name: Carlos Piper    History of Presenting Illness     Chief Complaint   Patient presents with    Numbness       History Provided By: Patient    HPI: Carlos Piper, 62 y.o. male presents to the ED with cc of arm paresthesias. 62 YOM presents emergency department with chief complaint of left arm paresthesias. Patient reports 1 to 2 days of intermittent left arm paresthesias. Describes a feeling of \"pins-and-needles\" down his left arm. He reports the sensation begins in his shoulder and radiates down his arm and involves his first through third fingers. It lasts a few seconds and is worse with his arm suspended and improved with his arm elevated. He does report some left-sided neck pain and believes he \"slept wrong. \"  Patient denies any headaches, denies any dysarthria or dysphagia. Denies any weakness in his arms or decreased sensation. Denies any lower extremity symptoms, dizziness or ataxia. Denies any history of CVA. Patient called his PCP and was unable to get an answer, he also called dispatch health who referred him to the emergency department. He denies chest pain or shortness of breath. Currently patient has no complaints. There are no other complaints, changes, or physical findings at this time. PCP: Kenya Venegas., NP    No current facility-administered medications on file prior to encounter. Current Outpatient Medications on File Prior to Encounter   Medication Sig Dispense Refill    doxepin (SINEquan) 10 mg capsule Take 1 Capsule by mouth nightly as needed (sleep). 90 Capsule 0    amLODIPine (NORVASC) 10 mg tablet TAKE 1 TABLET BY MOUTH EVERY DAY FOR BLOOD PRESSURE 90 Tablet 1    fluticasone propion-salmeteroL (Advair HFA) 230-21 mcg/actuation inhaler Take 2 Puffs by inhalation two (2) times a day.  1 Each 1    albuterol (PROVENTIL HFA, VENTOLIN HFA, PROAIR HFA) 90 mcg/actuation inhaler Take 1 Puff by inhalation every six (6) hours as needed for Wheezing or Shortness of Breath. 1 Each 1    tamsulosin (FLOMAX) 0.4 mg capsule TAKE 1 CAPSULE BY MOUTH EVERY DAY (Patient not taking: Reported on 8/8/2022) 30 Capsule 3    ammonium lactate (Lac-Hydrin Five) 5 % lotion Apply  to affected area as needed (dry skin). 120 mL 1    nicotine (NICODERM CQ) 21 mg/24 hr APPLY 1 PATCH BY TRANSDERMAL ROUTE EVERY TWENTY-FOUR (24) HOURS FOR 30 DAYS. 28 Patch 0       Past History     Past Medical History:  Past Medical History:   Diagnosis Date    Hypertension        Past Surgical History:  Past Surgical History:   Procedure Laterality Date    COLONOSCOPY N/A 10/27/2020    COLONOSCOPY performed by Dennis Egan MD at [facility] 34    [facility]      [facility]    NONEMERG TRANSP MILEAGE         Family History:  History reviewed. No pertinent family history. Social History:  Social History     Tobacco Use    Smoking status: Every Day     Packs/day: 1.00     Types: Cigarettes    Smokeless tobacco: Never   Vaping Use    Vaping Use: Never used   Substance Use Topics    Alcohol use: No    Drug use: No       Allergies: Allergies   Allergen Reactions    Chantix [Varenicline] Other (comments)     nightmares         Review of Systems   Review of Systems   Constitutional:  Negative for fever. HENT:  Negative for voice change. Eyes:  Negative for pain and redness. Respiratory:  Negative for cough and chest tightness. Cardiovascular:  Negative for chest pain and leg swelling. Gastrointestinal:  Negative for abdominal pain, diarrhea, nausea and vomiting. Genitourinary:  Negative for hematuria. Musculoskeletal:  Positive for neck pain. Negative for arthralgias and gait problem. Skin:  Negative for color change, pallor and rash. Neurological:  Positive for numbness. Negative for dizziness, facial asymmetry, weakness, light-headedness and headaches. Hematological:  Does not bruise/bleed easily. Psychiatric/Behavioral:  Negative for behavioral problems. All other systems reviewed and are negative. Physical Exam   Physical Exam  Vitals and nursing note reviewed. Constitutional:       Comments: 62 YOM, resting in bed, no distress   HENT:      Head: Normocephalic and atraumatic. Nose: Nose normal.      Mouth/Throat:      Mouth: Mucous membranes are moist.   Eyes:      Pupils: Pupils are equal, round, and reactive to light. Cardiovascular:      Rate and Rhythm: Normal rate and regular rhythm. Pulses: Normal pulses. Heart sounds: No murmur heard. No friction rub. No gallop. Comments: 2+ radial and ulnar pulses  Pulmonary:      Effort: Pulmonary effort is normal.      Breath sounds: Normal breath sounds. No wheezing, rhonchi or rales. Abdominal:      General: Abdomen is flat. There is no distension. Palpations: Abdomen is soft. Tenderness: There is no abdominal tenderness. Musculoskeletal:         General: Normal range of motion. Cervical back: Normal range of motion. Right lower leg: No edema. Left lower leg: No edema. Skin:     General: Skin is warm and dry. Capillary Refill: Capillary refill takes less than 2 seconds. Neurological:      General: No focal deficit present. Mental Status: He is alert. Cranial Nerves: No cranial nerve deficit. Sensory: No sensory deficit. Motor: No weakness.       Coordination: Coordination normal.      Comments: No paresthesias or weakness in C5-T1 dermatomas/myotomes   Psychiatric:         Mood and Affect: Mood normal.       Diagnostic Study Results     Labs -     Recent Results (from the past 12 hour(s))   EKG, 12 LEAD, INITIAL    Collection Time: 08/08/22  6:55 PM   Result Value Ref Range    Ventricular Rate 73 BPM    Atrial Rate 73 BPM    P-R Interval 140 ms    QRS Duration 88 ms    Q-T Interval 374 ms    QTC Calculation (Bezet) 412 ms    Calculated P Axis 74 degrees    Calculated R Axis -69 degrees    Calculated T Axis 32 degrees    Diagnosis       Normal sinus rhythm  Left axis deviation  No previous ECGs available         Radiologic Studies -   XR CHEST PORT   Final Result   No acute cardiopulmonary disease. CT Results  (Last 48 hours)      None          CXR Results  (Last 48 hours)                 08/08/22 1923  XR CHEST PORT Final result    Impression:  No acute cardiopulmonary disease. Narrative:  INDICATION: Atypical chest pain, arm paresthesias. Portable AP views of the chest.       Direct comparison made to prior chest x-ray dated June 2020. Cardiomediastinal silhouette is stable. Lungs are clear bilaterally. Pleural   spaces are normal and there is no pneumothorax. Osseous structures are intact. Medical Decision Making   I am the first provider for this patient. I reviewed the vital signs, available nursing notes, past medical history, past surgical history, family history and social history. Vital Signs-Reviewed the patient's vital signs. Patient Vitals for the past 12 hrs:   Temp Pulse Resp BP SpO2   08/08/22 1801 99.2 °F (37.3 °C) 92 16 (!) 147/90 93 %     Records Reviewed: Nursing Notes and Old Medical Records    Provider Notes (Medical Decision Making):     81-year-old male presents emergency department with left upper extremity paresthesias. Vitals are unremarkable. He has no complaints currently. Certainly, given patient's paresthesias, there is a concern for CVA, although I think this is low. Patient has an NIH of 0. He has no decreased sensation/nor weakness with positive paresthesias that involve the arm and first through third fingers is more suspicious for a peripheral process. Suspect cervical radiculopathy or brachiopathy. This is also suspicious for a peripheral process as it improves with elevation of the arm.   There are 2+ radial and ulnar pulses, good capillary perfusion, and I doubt that this is significant vascular disease. No swelling to suspect DVT. Will check EKG to exclude atypical ACS although patient denies chest pain. Check chest x-ray to exclude mass lesion of the lung that could contribute to thoracic outlet syndrome. Recommend muscle relaxers and steroids. I discussed that history is very suspicious for peripheral process, but cannot exclude central CVA without MRI which is not available at this facility at this time. Using shared decision-making, patient is comfortable foregoing this as he does not wish to be transferred. Believe this is reasonable after discussion of risk and benefits. ED Course:   Initial assessment performed. The patients presenting problems have been discussed, and they are in agreement with the care plan formulated and outlined with them. I have encouraged them to ask questions as they arise throughout their visit. ED Course as of 08/08/22 2238   Mon Aug 08, 2022   8863 Preliminary EKG interpreted by me. Shows normal sinus rhythm with a HR of 73. No ST elevations or depressions concerning for ischemia. Normal intervals. [MB]   1939 CXR without lesion. Patient requesting discharge. Will discharge with strict return precautions, patient instructed to call 911 if his symptoms return or persist in a new distribution or involve other areas. [MB]      ED Course User Index  [MB] MD Shilpa Irvin MD      Disposition:    Discharged    DISCHARGE PLAN:  1. Discharge Medication List as of 8/8/2022  7:41 PM        START taking these medications    Details   methylPREDNISolone (Medrol, Rufino,) 4 mg tablet Use as directed, Normal, Disp-1 Dose Pack, R-0      methocarbamoL (ROBAXIN) 750 mg tablet Take 1 Tablet by mouth three (3) times daily as needed for Muscle Spasm(s). , Normal, Disp-21 Tablet, R-0           CONTINUE these medications which have NOT CHANGED    Details   doxepin (SINEquan) 10 mg capsule Take 1 Capsule by mouth nightly as needed (sleep). , Normal, Disp-90 Capsule, R-0      amLODIPine (NORVASC) 10 mg tablet TAKE 1 TABLET BY MOUTH EVERY DAY FOR BLOOD PRESSURE, Normal, Disp-90 Tablet, R-1      fluticasone propion-salmeteroL (Advair HFA) 230-21 mcg/actuation inhaler Take 2 Puffs by inhalation two (2) times a day., Normal, Disp-1 Each, R-1      albuterol (PROVENTIL HFA, VENTOLIN HFA, PROAIR HFA) 90 mcg/actuation inhaler Take 1 Puff by inhalation every six (6) hours as needed for Wheezing or Shortness of Breath., Normal, Disp-1 Each, R-1Sub what is covered      tamsulosin (FLOMAX) 0.4 mg capsule TAKE 1 CAPSULE BY MOUTH EVERY DAY, Normal, Disp-30 Capsule, R-3      ammonium lactate (Lac-Hydrin Five) 5 % lotion Apply  to affected area as needed (dry skin). , Normal, Disp-120 mL, R-1      nicotine (NICODERM CQ) 21 mg/24 hr APPLY 1 PATCH BY TRANSDERMAL ROUTE EVERY TWENTY-FOUR (24) HOURS FOR 30 DAYS., Normal, Disp-28 Patch, R-0           2. Follow-up Information       Follow up With Specialties Details Why Contact Info    Jayant Lebron, NP Nurse Practitioner In 3 days  [address] [phone]      [facility] EMERGENCY DEPT Emergency Medicine  If symptoms worsen - arm weakness, trouble speaking, leg weakness [address]    Rj Cole MD Neurology In 1 week As needed [address] [address] [address] [place] [address]            3. Return to ED if worse     Diagnosis     Clinical Impression:   1. Paresthesias in left hand        Attestations:    Antoinette Hodgson MD        Please note that this dictation was completed with Soma, the [employer] voice recognition software. Quite often unanticipated grammatical, syntax, homophones, and other interpretive errors are inadvertently transcribed by the computer software. Please disregard these errors. Please excuse any errors that have escaped final proofreading. Thank you.

## 2022-08-08 NOTE — ED NOTES
Pt refused cardiac monitoring. Pt refused chest XR. Pt is requesting to go home because \"he is fine. \" ED provider notified.

## 2022-08-08 NOTE — ED NOTES
Bedside shift change report given to August Felix (oncoming nurse) by Sid Moralez (offgoing nurse). Report included the following information SBAR and ED Summary.

## 2022-08-08 NOTE — TELEPHONE ENCOUNTER
Received call from Diana Mcclain at [facility] with [employer]. Subjective: Caller states \"Having some numbness/tingling in left arm and fingertips for the past week. \"     Current Symptoms: numbness/tingling in left arm/fingertips for past week, denies pain, intermittent    Onset: 1 week ago; intermittent    Associated Symptoms: reduced activity    Pain Severity: 0/10; N/A; none    Temperature: Denies      What has been tried: medications    LMP: NA Pregnant: NA    Recommended disposition: Go to Office Now. See within 4 hours. Care advice provided, patient verbalizes understanding; denies any other questions or concerns; instructed to call back for any new or worsening symptoms. Patient/Caller agrees with recommended disposition; writer provided warm transfer to Alexus Hunt at [facility] for appointment scheduling    Attention Provider: Thank you for allowing me to participate in the care of your patient. The patient was connected to triage in response to information provided to the ECC. Please do not respond through this encounter as the response is not directed to a shared pool.       Reason for Disposition   Neurologic deficit of gradual onset (e.g., days to weeks), ANY of the following: * Weakness of the face, arm, or leg on one side of the body* Numbness of the face, arm, or leg on one side of the body* Loss of speech or garbled speech    Protocols used: Neurologic Deficit-ADULT-[place]

## 2022-08-08 NOTE — ED NOTES
Pt arrives to the ED AAOX4 with a c/c of intermittent CP and numbness to LUE onset two days ago. Pt denies any dizziness or HA. Pt able to speak in full sentences. Pt is noted in stable condition, now in ED room with side rail up, bed to lowest position, and call light within reach. Will continue to monitor. Emergency Department Nursing Plan of Care       The Nursing Plan of Care is developed from the Nursing assessment and Emergency Department Attending provider initial evaluation. The plan of care may be reviewed in the ED Provider note.     The Plan of Care was developed with the following considerations:   Patient / Family readiness to learn indicated by:verbalized understanding  Persons(s) to be included in education: patient  Barriers to Learning/Limitations:No    Signed     Sunday Thompson    8/8/2022   6:59 PM

## 2022-08-10 LAB
ATRIAL RATE: 73 BPM
CALCULATED P AXIS, ECG09: 74 DEGREES
CALCULATED R AXIS, ECG10: -69 DEGREES
CALCULATED T AXIS, ECG11: 32 DEGREES
DIAGNOSIS, 93000: NORMAL
P-R INTERVAL, ECG05: 140 MS
Q-T INTERVAL, ECG07: 374 MS
QRS DURATION, ECG06: 88 MS
QTC CALCULATION (BEZET), ECG08: 412 MS
VENTRICULAR RATE, ECG03: 73 BPM

## 2022-09-07 ENCOUNTER — OFFICE VISIT (OUTPATIENT)
Dept: INTERNAL MEDICINE CLINIC | Age: 62
End: 2022-09-07
Payer: COMMERCIAL

## 2022-09-07 ENCOUNTER — HOSPITAL ENCOUNTER (OUTPATIENT)
Dept: GENERAL RADIOLOGY | Age: 62
Discharge: HOME | End: 2022-09-07
Payer: COMMERCIAL

## 2022-09-07 VITALS
RESPIRATION RATE: 18 BRPM | HEIGHT: 69 IN | SYSTOLIC BLOOD PRESSURE: 151 MMHG | OXYGEN SATURATION: 90 % | HEART RATE: 88 BPM | WEIGHT: 190 LBS | TEMPERATURE: 98 F | BODY MASS INDEX: 28.14 KG/M2 | DIASTOLIC BLOOD PRESSURE: 98 MMHG

## 2022-09-07 DIAGNOSIS — F17.200 SMOKER: ICD-10-CM

## 2022-09-07 DIAGNOSIS — I10 ESSENTIAL HYPERTENSION: ICD-10-CM

## 2022-09-07 DIAGNOSIS — M54.2 NECK PAIN: ICD-10-CM

## 2022-09-07 DIAGNOSIS — G56.92 NEUROPATHY OF LEFT UPPER EXTREMITY: ICD-10-CM

## 2022-09-07 DIAGNOSIS — I10 ESSENTIAL HYPERTENSION: Primary | ICD-10-CM

## 2022-09-07 DIAGNOSIS — T81.82XD SUBCUTANEOUS EMPHYSEMA RESULTING FROM A PROCEDURE, SUBSEQUENT ENCOUNTER: ICD-10-CM

## 2022-09-07 PROCEDURE — 99214 OFFICE O/P EST MOD 30 MIN: CPT | Performed by: NURSE PRACTITIONER

## 2022-09-07 PROCEDURE — 72050 X-RAY EXAM NECK SPINE 4/5VWS: CPT

## 2022-09-07 RX ORDER — FLUTICASONE PROPIONATE AND SALMETEROL XINAFOATE 230; 21 UG/1; UG/1
2 AEROSOL, METERED RESPIRATORY (INHALATION) 2 TIMES DAILY
Qty: 1 EACH | Refills: 1 | Status: SHIPPED | OUTPATIENT
Start: 2022-09-07 | End: 2022-09-29 | Stop reason: SDUPTHER

## 2022-09-07 RX ORDER — GABAPENTIN 100 MG/1
100 CAPSULE ORAL 3 TIMES DAILY
Qty: 90 CAPSULE | Refills: 0 | Status: SHIPPED | OUTPATIENT
Start: 2022-09-07

## 2022-09-07 NOTE — PROGRESS NOTES
Shell Knapp is a 62 y.o. male  Chief Complaint   Patient presents with    Follow-up     Tingling on left side for past 3 weeks. Need refill on Advair , Having SOB     1. Have you been to the ER, yes urgent care clinic since your last visit? no Hospitalized since your last visit? no    2.  Have you seen or consulted any other health care providers outside of the [address] [place] [address] since your last visit?no

## 2022-09-07 NOTE — PROGRESS NOTES
Subjective: (As above and below)     Chief Complaint   Patient presents with    Follow-up     Tingling on left side for past 3 weeks. Need refill on Advair , Having SOB     America Lieberman is a 62y.o. year old male who presents for     Hypertension ROS:  taking medications as instructed, no medication side effects noted, no TIAs, no chest pain on exertion, no dyspnea on exertion, no swelling of ankles  BP Readings from Last 3 Encounters:   09/07/22 (!) 151/98   08/08/22 (!) 147/90   03/17/22 128/87        Emphysema; out of advair, continues to smoke    L arm pain and tingling x 3 weeks, no inciting injury but does lift a lot  Went to ED bc he was worried that he was having  a stroke but s/s more in line w/ msk as he has also having neck pain  S/s slightly better since onset but still having some neuropathy to L arm      Reviewed PmHx, RxHx, FmHx, SocHx, AllgHx and updated in chart. No family history on file. Past Medical History:   Diagnosis Date    Hypertension       Social History     Socioeconomic History    Marital status:     Number of children: 1   Occupational History    Occupation: [employer]     Employer: [employer]     Comment: s. Tobacco Use    Smoking status: Every Day     Packs/day: 1.00     Types: Cigarettes    Smokeless tobacco: Never   Vaping Use    Vaping Use: Never used   Substance and Sexual Activity    Alcohol use: No    Drug use: No    Sexual activity: Not Currently     Partners: Female   Social History Narrative    Lives with girlfriend, has one son, age 15, lives with mother. Works at [employer] for 3 years          Current Outpatient Medications   Medication Sig    fluticasone propion-salmeteroL (Advair HFA) 230-21 mcg/actuation inhaler Take 2 Puffs by inhalation two (2) times a day.    gabapentin (NEURONTIN) 100 mg capsule Take 1 Capsule by mouth three (3) times daily.  Max Daily Amount: 300 mg.    doxepin (SINEquan) 10 mg capsule Take 1 Capsule by mouth nightly as needed (sleep). amLODIPine (NORVASC) 10 mg tablet TAKE 1 TABLET BY MOUTH EVERY DAY FOR BLOOD PRESSURE    ammonium lactate (Lac-Hydrin Five) 5 % lotion Apply  to affected area as needed (dry skin). albuterol (PROVENTIL HFA, VENTOLIN HFA, PROAIR HFA) 90 mcg/actuation inhaler Take 1 Puff by inhalation every six (6) hours as needed for Wheezing or Shortness of Breath. tamsulosin (FLOMAX) 0.4 mg capsule TAKE 1 CAPSULE BY MOUTH EVERY DAY (Patient not taking: No sig reported)    nicotine (NICODERM CQ) 21 mg/24 hr APPLY 1 PATCH BY TRANSDERMAL ROUTE EVERY TWENTY-FOUR (24) HOURS FOR 30 DAYS. (Patient not taking: Reported on 9/7/2022)     No current facility-administered medications for this visit. Review of Systems:   Constitutional:    Negative for fever and chills, negative diaphoresis. HEENT:              Negative for neck pain and stiffness. Eyes:                  Negative for visual disturbance, itching, redness or discharge. Respiratory:        Negative for cough and shortness of breath. Cardiovascular:  Negative for chest pain and palpitations. Gastrointestinal: Negative for nausea, vomiting, abdominal pain, diarrhea or constipation. Genitourinary:     Negative for dysuria and frequency. Musculoskeletal: Negative for falls, tenderness and swelling. Skin:                    Negative for rash, masses or lesions. Neurological:       Negative for dizzyness, seizure, loss of consciousness, weakness and numbness.      Objective:     Vitals:    09/07/22 1511 09/07/22 1527   BP: (!) 151/100 (!) 151/98   Pulse: 88    Resp: 18    Temp: 98 °F (36.7 °C)    TempSrc: Temporal    SpO2: 90%    Weight: 190 lb (86.2 kg)    Height: 5' 9\" (1.753 m)        Results for orders placed or performed during the hospital encounter of 08/08/22   EKG, 12 LEAD, INITIAL   Result Value Ref Range    Ventricular Rate 73 BPM    Atrial Rate 73 BPM    P-R Interval 140 ms    QRS Duration 88 ms    Q-T Interval 374 ms    QTC Calculation (Bezet) 412 ms    Calculated P Axis 74 degrees    Calculated R Axis -69 degrees    Calculated T Axis 32 degrees    Diagnosis       Normal sinus rhythm  Left axis deviation  No previous ECGs available  Confirmed by Francisca HARDY, Ashly Moeller ([id number]) on 8/10/2022 8:59:01 PM         Gen: Oriented to person, place and time and well-developed, well-nourished and in no distress. HEENT:    Head: normocephalic and atraumatic. Eyes:  EOM are normal. Pupils equal and round. Neck:  Normal range of motion. Neck supple. Cardiovascular: normal rate, regular rhythm and normal heart sounds. Pulmonary/Chest:  Effort normal and breath sounds normal.  No respiratory distress. No wheezes, no rales. Abdominal: soft, normal  bowel sounds. Musculoskeletal:  No edema, no tenderness. No calf tenderness or edema. Mild ttp to cervical paraspinals, head/neck/arm full ROM   strength 5/5  Neurological:  Alert, oriented to person, place and time. Skin: skin is warm and dry. Assessment/ Plan:     Follow-up and Dispositions    Return in about 3 weeks (around 9/28/2022) for nv bp check. 1. Essential hypertension  BP normally better, resume advair, fu for NV bp check adjust INI  - METABOLIC PANEL, COMPREHENSIVE; Future  - LIPID PANEL; Future  - CBC W/O DIFF; Future    2. Smoker      3. Subcutaneous emphysema resulting from a procedure, subsequent encounter      4. Neuropathy of left upper extremity    - XR SPINE CERV 4 OR 5 V; Future  - gabapentin (NEURONTIN) 100 mg capsule; Take 1 Capsule by mouth three (3) times daily. Max Daily Amount: 300 mg. Dispense: 90 Capsule; Refill: 0    5. Neck pain    - XR SPINE CERV 4 OR 5 V; Future  - gabapentin (NEURONTIN) 100 mg capsule; Take 1 Capsule by mouth three (3) times daily. Max Daily Amount: 300 mg. Dispense: 90 Capsule; Refill: 0        I have discussed the diagnosis with the patient and the intended plan as seen in the above orders.   The patient has received an after-visit summary and questions were answered concerning future plans. Pt conveyed understanding of plan. Medication Side Effects and Warnings were discussed with patient: yes  Patient Labs were reviewed: yes  Patient Past Records were reviewed:  yes    Leeann Maldonado.  Doris Moore NP

## 2022-09-08 LAB
ALBUMIN SERPL-MCNC: 3.9 G/DL (ref 3.5–5)
ALBUMIN/GLOB SERPL: 1.1 {RATIO} (ref 1.1–2.2)
ALP SERPL-CCNC: 118 U/L (ref 45–117)
ALT SERPL-CCNC: 29 U/L (ref 12–78)
ANION GAP SERPL CALC-SCNC: 3 MMOL/L (ref 5–15)
AST SERPL-CCNC: 44 U/L (ref 15–37)
BILIRUB SERPL-MCNC: 0.5 MG/DL (ref 0.2–1)
BUN SERPL-MCNC: 15 MG/DL (ref 6–20)
BUN/CREAT SERPL: 13 (ref 12–20)
CALCIUM SERPL-MCNC: 9.7 MG/DL (ref 8.5–10.1)
CHLORIDE SERPL-SCNC: 105 MMOL/L (ref 97–108)
CHOLEST SERPL-MCNC: 148 MG/DL
CO2 SERPL-SCNC: 31 MMOL/L (ref 21–32)
CREAT SERPL-MCNC: 1.12 MG/DL (ref 0.7–1.3)
ERYTHROCYTE [DISTWIDTH] IN BLOOD BY AUTOMATED COUNT: 14.1 % (ref 11.5–14.5)
GLOBULIN SER CALC-MCNC: 3.4 G/DL (ref 2–4)
GLUCOSE SERPL-MCNC: 83 MG/DL (ref 65–100)
HCT VFR BLD AUTO: 49.5 % (ref 36.6–50.3)
HDLC SERPL-MCNC: 66 MG/DL
HDLC SERPL: 2.2 {RATIO} (ref 0–5)
HGB BLD-MCNC: 15.2 G/DL (ref 12.1–17)
LDLC SERPL CALC-MCNC: 68.8 MG/DL (ref 0–100)
MCH RBC QN AUTO: 27.2 PG (ref 26–34)
MCHC RBC AUTO-ENTMCNC: 30.7 G/DL (ref 30–36.5)
MCV RBC AUTO: 88.6 FL (ref 80–99)
NRBC # BLD: 0 K/UL (ref 0–0.01)
NRBC BLD-RTO: 0 PER 100 WBC
PLATELET # BLD AUTO: 223 K/UL (ref 150–400)
PMV BLD AUTO: 10.7 FL (ref 8.9–12.9)
POTASSIUM SERPL-SCNC: 3.9 MMOL/L (ref 3.5–5.1)
PROT SERPL-MCNC: 7.3 G/DL (ref 6.4–8.2)
RBC # BLD AUTO: 5.59 M/UL (ref 4.1–5.7)
SODIUM SERPL-SCNC: 139 MMOL/L (ref 136–145)
TRIGL SERPL-MCNC: 66 MG/DL (ref ?–150)
VLDLC SERPL CALC-MCNC: 13.2 MG/DL
WBC # BLD AUTO: 7.4 K/UL (ref 4.1–11.1)

## 2022-09-29 ENCOUNTER — DOCUMENTATION ONLY (OUTPATIENT)
Dept: INTERNAL MEDICINE CLINIC | Age: 62
End: 2022-09-29

## 2022-09-29 ENCOUNTER — CLINICAL SUPPORT (OUTPATIENT)
Dept: INTERNAL MEDICINE CLINIC | Age: 62
End: 2022-09-29

## 2022-09-29 VITALS
RESPIRATION RATE: 18 BRPM | HEIGHT: 69 IN | BODY MASS INDEX: 26.66 KG/M2 | OXYGEN SATURATION: 88 % | TEMPERATURE: 98.7 F | HEART RATE: 72 BPM | WEIGHT: 180 LBS | DIASTOLIC BLOOD PRESSURE: 85 MMHG | SYSTOLIC BLOOD PRESSURE: 129 MMHG

## 2022-09-29 DIAGNOSIS — Z01.30 BP CHECK: Primary | ICD-10-CM

## 2022-09-29 DIAGNOSIS — I10 ESSENTIAL HYPERTENSION: ICD-10-CM

## 2022-09-29 DIAGNOSIS — R06.02 SHORTNESS OF BREATH: ICD-10-CM

## 2022-09-29 DIAGNOSIS — J43.1 PANLOBULAR EMPHYSEMA (HCC): ICD-10-CM

## 2022-09-29 DIAGNOSIS — J43.9 PULMONARY EMPHYSEMA, UNSPECIFIED EMPHYSEMA TYPE (HCC): Primary | ICD-10-CM

## 2022-09-29 RX ORDER — PREDNISONE 10 MG/1
10 TABLET ORAL SEE ADMIN INSTRUCTIONS
Qty: 21 TABLET | Refills: 0 | Status: SHIPPED | OUTPATIENT
Start: 2022-09-29

## 2022-09-29 RX ORDER — ALBUTEROL SULFATE 90 UG/1
1 AEROSOL, METERED RESPIRATORY (INHALATION)
Qty: 1 EACH | Refills: 1 | Status: SHIPPED | OUTPATIENT
Start: 2022-09-29

## 2022-09-29 RX ORDER — FLUTICASONE PROPIONATE AND SALMETEROL XINAFOATE 230; 21 UG/1; UG/1
2 AEROSOL, METERED RESPIRATORY (INHALATION) 2 TIMES DAILY
Qty: 1 EACH | Refills: 1 | Status: SHIPPED | OUTPATIENT
Start: 2022-09-29

## 2022-09-29 RX ORDER — AMLODIPINE BESYLATE 10 MG/1
TABLET ORAL
Qty: 90 TABLET | Refills: 1 | Status: SHIPPED | OUTPATIENT
Start: 2022-09-29

## 2022-09-29 NOTE — PROGRESS NOTES
Chief Complaint   Patient presents with    Blood Pressure Check       Visit Vitals  /85 (BP 1 Location: Left upper arm, BP Patient Position: Sitting, BP Cuff Size: Adult)   Pulse 72   Temp 98.7 °F (37.1 °C) (Temporal)   Resp 18   Ht 5' 9\" (1.753 m)   Wt 180 lb (81.6 kg)   SpO2 (!) 88%   BMI 26.58 kg/m²       Provider notified, no changes made for BP.  O2 is low pt has been referred to pulm and will have echo scheduled, Nebulizer and supplies will be sent as well

## 2022-11-10 ENCOUNTER — TRANSCRIBE ORDER (OUTPATIENT)
Dept: SCHEDULING | Age: 62
End: 2022-11-10

## 2022-11-10 DIAGNOSIS — F17.219 NICOTINE DEPENDENCE, CIGARETTES, W UNSP DISORDERS: Primary | ICD-10-CM

## 2022-11-29 ENCOUNTER — HOSPITAL ENCOUNTER (OUTPATIENT)
Dept: CT IMAGING | Age: 62
Discharge: HOME | End: 2022-11-29
Attending: INTERNAL MEDICINE
Payer: COMMERCIAL

## 2022-11-29 DIAGNOSIS — F17.219 NICOTINE DEPENDENCE, CIGARETTES, W UNSP DISORDERS: ICD-10-CM

## 2022-11-29 PROCEDURE — 71271 CT THORAX LUNG CANCER SCR C-: CPT

## 2022-12-01 ENCOUNTER — OFFICE VISIT (OUTPATIENT)
Dept: INTERNAL MEDICINE CLINIC | Age: 62
End: 2022-12-01
Payer: COMMERCIAL

## 2022-12-01 VITALS
OXYGEN SATURATION: 89 % | TEMPERATURE: 97.9 F | SYSTOLIC BLOOD PRESSURE: 121 MMHG | RESPIRATION RATE: 17 BRPM | BODY MASS INDEX: 25.77 KG/M2 | WEIGHT: 174 LBS | HEIGHT: 69 IN | HEART RATE: 73 BPM | DIASTOLIC BLOOD PRESSURE: 85 MMHG

## 2022-12-01 DIAGNOSIS — G56.92 NEUROPATHY OF LEFT UPPER EXTREMITY: ICD-10-CM

## 2022-12-01 DIAGNOSIS — J43.1 PANLOBULAR EMPHYSEMA (HCC): ICD-10-CM

## 2022-12-01 DIAGNOSIS — N40.1 BENIGN PROSTATIC HYPERPLASIA WITH WEAK URINARY STREAM: ICD-10-CM

## 2022-12-01 DIAGNOSIS — R39.12 BENIGN PROSTATIC HYPERPLASIA WITH WEAK URINARY STREAM: ICD-10-CM

## 2022-12-01 DIAGNOSIS — I10 ESSENTIAL HYPERTENSION: Primary | ICD-10-CM

## 2022-12-01 DIAGNOSIS — F17.200 SMOKER: ICD-10-CM

## 2022-12-01 DIAGNOSIS — M54.2 NECK PAIN: ICD-10-CM

## 2022-12-01 DIAGNOSIS — R35.0 URINARY FREQUENCY: ICD-10-CM

## 2022-12-01 LAB
BILIRUB UR QL STRIP: NEGATIVE
GLUCOSE UR-MCNC: NEGATIVE MG/DL
KETONES P FAST UR STRIP-MCNC: NEGATIVE MG/DL
PH UR STRIP: 5 [PH] (ref 4.6–8)
PROT UR QL STRIP: NORMAL
SP GR UR STRIP: 1.03 (ref 1–1.03)
UA UROBILINOGEN AMB POC: NORMAL (ref 0.2–1)
URINALYSIS CLARITY POC: CLEAR
URINALYSIS COLOR POC: YELLOW
URINE BLOOD POC: NORMAL
URINE LEUKOCYTES POC: NORMAL
URINE NITRITES POC: NEGATIVE

## 2022-12-01 PROCEDURE — 99214 OFFICE O/P EST MOD 30 MIN: CPT | Performed by: NURSE PRACTITIONER

## 2022-12-01 PROCEDURE — 81003 URINALYSIS AUTO W/O SCOPE: CPT | Performed by: NURSE PRACTITIONER

## 2022-12-01 RX ORDER — FLUTICASONE FUROATE, UMECLIDINIUM BROMIDE AND VILANTEROL TRIFENATATE 200; 62.5; 25 UG/1; UG/1; UG/1
1 POWDER RESPIRATORY (INHALATION) DAILY
COMMUNITY
End: 2022-12-01 | Stop reason: SDUPTHER

## 2022-12-01 RX ORDER — HYDROCHLOROTHIAZIDE 25 MG/1
TABLET ORAL
COMMUNITY

## 2022-12-01 RX ORDER — AMMONIUM LACTATE 5 %
LOTION (GRAM) TOPICAL AS NEEDED
Qty: 120 ML | Refills: 1 | Status: SHIPPED | OUTPATIENT
Start: 2022-12-01

## 2022-12-01 RX ORDER — GABAPENTIN 100 MG/1
100 CAPSULE ORAL 3 TIMES DAILY
Qty: 90 CAPSULE | Refills: 0 | Status: SHIPPED | OUTPATIENT
Start: 2022-12-01

## 2022-12-01 RX ORDER — TAMSULOSIN HYDROCHLORIDE 0.4 MG/1
0.4 CAPSULE ORAL DAILY
Qty: 90 CAPSULE | Refills: 0 | Status: SHIPPED | OUTPATIENT
Start: 2022-12-01

## 2022-12-01 RX ORDER — FLUTICASONE FUROATE, UMECLIDINIUM BROMIDE AND VILANTEROL TRIFENATATE 200; 62.5; 25 UG/1; UG/1; UG/1
1 POWDER RESPIRATORY (INHALATION) DAILY
Qty: 1 BLISTER | Refills: 1 | Status: SHIPPED | OUTPATIENT
Start: 2022-12-01

## 2022-12-01 NOTE — PROGRESS NOTES
Chief Complaint   Patient presents with    Follow-up     BP, tingling in left hand    Results     CT scan     Urinary Frequency     Pt states some days urine flows fast and some days it flows \"thin\"    Labs     Prostate labs        1. \"Have you been to the ER, urgent care clinic since your last visit? Hospitalized since your last visit? \" No    2. \"Have you seen or consulted any other health care providers outside of the [address] [place] [address] since your last visit? \" No     3. For patients aged 39-70: Has the patient had a colonoscopy / FIT/ Cologuard? Yes - no Care Gap present      If the patient is female:    4. For patients aged 41-77: Has the patient had a mammogram within the past 2 years? NA - based on age or sex      11. For patients aged 21-65: Has the patient had a pap smear?  NA - based on age or sex

## 2022-12-01 NOTE — PROGRESS NOTES
Subjective: (As above and below)     Chief Complaint   Patient presents with    Follow-up     BP, tingling in left hand    Results     CT scan     Urinary Frequency     Pt states some days urine flows fast and some days it flows \"thin\"    Tobias Lao is a 65y.o. year old male who presents for     Hypertension ROS:  taking medications as instructed, no medication side effects noted, no TIAs, no chest pain on exertion, no dyspnea on exertion, no swelling of ankles    BP Readings from Last 3 Encounters:   12/01/22 121/85   09/29/22 129/85   09/07/22 (!) 151/98       Smoker; not ready to quit    Emphysema; follows w/ pulm, stable pulmonary nodule  Per pt not recc home O2 yet    BPH; has urine dribbling and delayed flow/weak stream    Neck pain: radiating to L arm w/ tingling  No weakness  Neck xray shows an osteophyte otherwise nl        Reviewed PmHx, RxHx, FmHx, SocHx, AllgHx and updated in chart. History reviewed. No pertinent family history. Past Medical History:   Diagnosis Date    Hypertension       Social History     Socioeconomic History    Marital status:     Number of children: 1   Occupational History    Occupation: [employer]     Employer: [employer]     Comment: s. Tobacco Use    Smoking status: Every Day     Packs/day: 1.00     Types: Cigarettes    Smokeless tobacco: Never   Vaping Use    Vaping Use: Never used   Substance and Sexual Activity    Alcohol use: No    Drug use: No    Sexual activity: Not Currently     Partners: Female   Social History Narrative    Lives with girlfriend, has one son, age 15, lives with mother. Works at [employer] for 3 years          Current Outpatient Medications   Medication Sig    hydroCHLOROthiazide (HYDRODIURIL) 25 mg tablet 1 tab(s) orally once a day for 90 days    fluticasone-umeclidin-vilanter (Trelegy Ellipta) 200-62.5-25 mcg inhaler Take 1 Puff by inhalation daily.     albuterol (PROVENTIL HFA, VENTOLIN HFA, PROAIR HFA) 90 mcg/actuation inhaler Take 1 Puff by inhalation every six (6) hours as needed for Wheezing or Shortness of Breath. amLODIPine (NORVASC) 10 mg tablet TAKE 1 TABLET BY MOUTH EVERY DAY FOR BLOOD PRESSURE    ammonium lactate (Lac-Hydrin Five) 5 % lotion Apply  to affected area as needed (dry skin). fluticasone propion-salmeteroL (Advair HFA) 230-21 mcg/actuation inhaler Take 2 Puffs by inhalation two (2) times a day. (Patient not taking: Reported on 12/1/2022)    gabapentin (NEURONTIN) 100 mg capsule Take 1 Capsule by mouth three (3) times daily. Max Daily Amount: 300 mg. (Patient not taking: Reported on 12/1/2022)    doxepin (SINEquan) 10 mg capsule Take 1 Capsule by mouth nightly as needed (sleep). (Patient not taking: Reported on 12/1/2022)    tamsulosin (FLOMAX) 0.4 mg capsule TAKE 1 CAPSULE BY MOUTH EVERY DAY (Patient not taking: No sig reported)    nicotine (NICODERM CQ) 21 mg/24 hr APPLY 1 PATCH BY TRANSDERMAL ROUTE EVERY TWENTY-FOUR (24) HOURS FOR 30 DAYS. (Patient not taking: Reported on 9/7/2022)     No current facility-administered medications for this visit. Review of Systems:   Constitutional:    Negative for fever and chills, negative diaphoresis. HEENT:              Negative for neck pain and stiffness. Eyes:                  Negative for visual disturbance, itching, redness or discharge. Respiratory:        Negative for cough and shortness of breath. Cardiovascular:  Negative for chest pain and palpitations. Gastrointestinal: Negative for nausea, vomiting, abdominal pain, diarrhea or constipation. Genitourinary:     Negative for dysuria and frequency. Musculoskeletal: Negative for falls, tenderness and swelling. Skin:                    Negative for rash, masses or lesions. Neurological:       Negative for dizzyness, seizure, loss of consciousness, weakness and numbness.      Objective:     Vitals:    12/01/22 1551   BP: 121/85   Pulse: 73   Resp: 17   Temp: 97.9 °F (36.6 °C) [facility]: Temporal   SpO2: (!) 89%   Weight: 174 lb (78.9 kg)   Height: 5' 9\" (1.753 m)       Results for orders placed or performed in visit on 58/05/55   METABOLIC PANEL, COMPREHENSIVE   Result Value Ref Range    Sodium 139 136 - 145 mmol/L    Potassium 3.9 3.5 - 5.1 mmol/L    Chloride 105 97 - 108 mmol/L    CO2 31 21 - 32 mmol/L    Anion gap 3 (L) 5 - 15 mmol/L    Glucose 83 65 - 100 mg/dL    BUN 15 6 - 20 MG/DL    Creatinine 1.12 0.70 - 1.30 MG/DL    BUN/Creatinine ratio 13 12 - 20      GFR est AA >60 >60 ml/min/1.73m2    GFR est non-AA >60 >60 ml/min/1.73m2    Calcium 9.7 8.5 - 10.1 MG/DL    Bilirubin, total 0.5 0.2 - 1.0 MG/DL    ALT (SGPT) 29 12 - 78 U/L    AST (SGOT) 44 (H) 15 - 37 U/L    Alk. phosphatase 118 (H) 45 - 117 U/L    Protein, total 7.3 6.4 - 8.2 g/dL    Albumin 3.9 3.5 - 5.0 g/dL    Globulin 3.4 2.0 - 4.0 g/dL    A-G Ratio 1.1 1.1 - 2.2     LIPID PANEL   Result Value Ref Range    Cholesterol, total 148 <200 MG/DL    Triglyceride 66 <150 MG/DL    HDL Cholesterol 66 MG/DL    LDL, calculated 68.8 0 - 100 MG/DL    VLDL, calculated 13.2 MG/DL    CHOL/HDL Ratio 2.2 0.0 - 5.0     CBC W/O DIFF   Result Value Ref Range    WBC 7.4 4.1 - 11.1 K/uL    RBC 5.59 4. 10 - 5.70 M/uL    HGB 15.2 12.1 - 17.0 g/dL    HCT 49.5 36.6 - 50.3 %    MCV 88.6 80.0 - 99.0 FL    MCH 27.2 26.0 - 34.0 PG    MCHC 30.7 30.0 - 36.5 g/dL    RDW 14.1 11.5 - 14.5 %    PLATELET 515 597 - 471 K/uL    MPV 10.7 8.9 - 12.9 FL    NRBC 0.0 0  WBC    ABSOLUTE NRBC 0.00 0.00 - 0.01 K/uL       Gen: Oriented to person, place and time and well-developed, well-nourished and in no distress. HEENT:    Head: normocephalic and atraumatic. Eyes:  EOM are normal. Pupils equal and round. Neck:  Normal range of motion. Neck supple. Cardiovascular: normal rate, regular rhythm and normal heart sounds. Pulmonary/Chest:  Effort normal and breath sounds normal.  No respiratory distress. No wheezes, no rales.    Abdominal: soft, normal bowel sounds. Musculoskeletal:  No edema, no tenderness. No calf tenderness or edema. Neurological:  Alert, oriented to person, place and time. Skin: skin is warm and dry. Assessment/ Plan:       1. Essential hypertension      2. Panlobular emphysema ([address]    Fu puolm  3. Smoker  Encouraged cessation    4. Benign prostatic hyperplasia with weak urinary stream    - AMB POC URINALYSIS DIP STICK AUTO W/O MICRO  - PSA W/ REFLX FREE PSA; Future      5. Urinary frequency    - tamsulosin (FLOMAX) 0.4 mg capsule; Take 1 Capsule by mouth daily. Dispense: 90 Capsule; Refill: 0    6. Neuropathy of left upper extremity    - gabapentin (NEURONTIN) 100 mg capsule; Take 1 Capsule by mouth three (3) times daily. Max Daily Amount: 300 mg. Dispense: 90 Capsule; Refill: 0    7. Neck pain  Fu INI  - gabapentin (NEURONTIN) 100 mg capsule; Take 1 Capsule by mouth three (3) times daily. Max Daily Amount: 300 mg. Dispense: 90 Capsule; Refill: 0        I have discussed the diagnosis with the patient and the intended plan as seen in the above orders. The patient has received an after-visit summary and questions were answered concerning future plans. Pt conveyed understanding of plan. Medication Side Effects and Warnings were discussed with patient: yes  Patient Labs were reviewed: yes  Patient Past Records were reviewed:  yes    Luke Berkowitz.  Srinivasa Berman NP

## 2023-02-20 ENCOUNTER — APPOINTMENT (OUTPATIENT)
Dept: CT IMAGING | Age: 63
DRG: 139 | End: 2023-02-20
Attending: STUDENT IN AN ORGANIZED HEALTH CARE EDUCATION/TRAINING PROGRAM
Payer: COMMERCIAL

## 2023-02-20 ENCOUNTER — HOSPITAL ENCOUNTER (INPATIENT)
Age: 63
LOS: 1 days | Discharge: HOME | DRG: 139 | End: 2023-02-21
Attending: STUDENT IN AN ORGANIZED HEALTH CARE EDUCATION/TRAINING PROGRAM | Admitting: STUDENT IN AN ORGANIZED HEALTH CARE EDUCATION/TRAINING PROGRAM
Payer: COMMERCIAL

## 2023-02-20 DIAGNOSIS — J18.9 COMMUNITY ACQUIRED PNEUMONIA OF LEFT LOWER LOBE OF LUNG: Primary | ICD-10-CM

## 2023-02-20 DIAGNOSIS — Z72.0 TOBACCO USE: ICD-10-CM

## 2023-02-20 DIAGNOSIS — J96.01 ACUTE RESPIRATORY FAILURE WITH HYPOXIA (HCC): ICD-10-CM

## 2023-02-20 PROBLEM — J96.91 RESPIRATORY FAILURE WITH HYPOXIA (HCC): Status: ACTIVE | Noted: 2023-02-20

## 2023-02-20 LAB
ALBUMIN SERPL-MCNC: 3.3 G/DL (ref 3.5–5)
ALBUMIN/GLOB SERPL: 0.9 (ref 1.1–2.2)
ALP SERPL-CCNC: 95 U/L (ref 45–117)
ALT SERPL-CCNC: 38 U/L (ref 12–78)
ANION GAP SERPL CALC-SCNC: 7 MMOL/L (ref 5–15)
APTT PPP: 22.3 SEC (ref 22.1–31)
AST SERPL-CCNC: 32 U/L (ref 15–37)
BASOPHILS # BLD: 0 K/UL (ref 0–0.1)
BASOPHILS NFR BLD: 0 % (ref 0–1)
BILIRUB SERPL-MCNC: 0.6 MG/DL (ref 0.2–1)
BNP SERPL-MCNC: 242 PG/ML (ref 0–125)
BUN SERPL-MCNC: 13 MG/DL (ref 6–20)
BUN/CREAT SERPL: 10 (ref 12–20)
CALCIUM SERPL-MCNC: 8.8 MG/DL (ref 8.5–10.1)
CHLORIDE SERPL-SCNC: 103 MMOL/L (ref 97–108)
CO2 SERPL-SCNC: 32 MMOL/L (ref 21–32)
CREAT SERPL-MCNC: 1.34 MG/DL (ref 0.7–1.3)
DIFFERENTIAL METHOD BLD: ABNORMAL
EOSINOPHIL # BLD: 0.2 K/UL (ref 0–0.4)
EOSINOPHIL NFR BLD: 1 % (ref 0–7)
ERYTHROCYTE [DISTWIDTH] IN BLOOD BY AUTOMATED COUNT: 13.8 % (ref 11.5–14.5)
FLUAV RNA SPEC QL NAA+PROBE: NOT DETECTED
FLUBV RNA SPEC QL NAA+PROBE: NOT DETECTED
GLOBULIN SER CALC-MCNC: 3.6 G/DL (ref 2–4)
GLUCOSE SERPL-MCNC: 133 MG/DL (ref 65–100)
HCT VFR BLD AUTO: 43.5 % (ref 36.6–50.3)
HGB BLD-MCNC: 13.3 G/DL (ref 12.1–17)
IMM GRANULOCYTES # BLD AUTO: 0.1 K/UL (ref 0–0.04)
IMM GRANULOCYTES NFR BLD AUTO: 0 % (ref 0–0.5)
INR PPP: 1.1 (ref 0.9–1.1)
LYMPHOCYTES # BLD: 1.3 K/UL (ref 0.8–3.5)
LYMPHOCYTES NFR BLD: 10 % (ref 12–49)
MCH RBC QN AUTO: 27.4 PG (ref 26–34)
MCHC RBC AUTO-ENTMCNC: 30.6 G/DL (ref 30–36.5)
MCV RBC AUTO: 89.5 FL (ref 80–99)
MONOCYTES # BLD: 0.9 K/UL (ref 0–1)
MONOCYTES NFR BLD: 7 % (ref 5–13)
NEUTS SEG # BLD: 10.5 K/UL (ref 1.8–8)
NEUTS SEG NFR BLD: 82 % (ref 32–75)
NRBC # BLD: 0 K/UL (ref 0–0.01)
NRBC BLD-RTO: 0 PER 100 WBC
PLATELET # BLD AUTO: 160 K/UL (ref 150–400)
PMV BLD AUTO: 9.9 FL (ref 8.9–12.9)
POTASSIUM SERPL-SCNC: 3.4 MMOL/L (ref 3.5–5.1)
PROT SERPL-MCNC: 6.9 G/DL (ref 6.4–8.2)
PROTHROMBIN TIME: 11 SEC (ref 9–11.1)
RBC # BLD AUTO: 4.86 M/UL (ref 4.1–5.7)
SARS-COV-2 RNA RESP QL NAA+PROBE: NOT DETECTED
SODIUM SERPL-SCNC: 142 MMOL/L (ref 136–145)
THERAPEUTIC RANGE,PTTT: NORMAL SECS (ref 58–77)
TROPONIN I SERPL HS-MCNC: 10 NG/L (ref 0–76)
WBC # BLD AUTO: 13 K/UL (ref 4.1–11.1)

## 2023-02-20 PROCEDURE — 74011250637 HC RX REV CODE- 250/637: Performed by: STUDENT IN AN ORGANIZED HEALTH CARE EDUCATION/TRAINING PROGRAM

## 2023-02-20 PROCEDURE — G0378 HOSPITAL OBSERVATION PER HR: HCPCS

## 2023-02-20 PROCEDURE — 96376 TX/PRO/DX INJ SAME DRUG ADON: CPT

## 2023-02-20 PROCEDURE — 74011000250 HC RX REV CODE- 250: Performed by: STUDENT IN AN ORGANIZED HEALTH CARE EDUCATION/TRAINING PROGRAM

## 2023-02-20 PROCEDURE — 85025 COMPLETE CBC W/AUTO DIFF WBC: CPT

## 2023-02-20 PROCEDURE — 74011000636 HC RX REV CODE- 636: Performed by: STUDENT IN AN ORGANIZED HEALTH CARE EDUCATION/TRAINING PROGRAM

## 2023-02-20 PROCEDURE — 96374 THER/PROPH/DIAG INJ IV PUSH: CPT

## 2023-02-20 PROCEDURE — 96375 TX/PRO/DX INJ NEW DRUG ADDON: CPT

## 2023-02-20 PROCEDURE — 77010033678 HC OXYGEN DAILY

## 2023-02-20 PROCEDURE — 74011250636 HC RX REV CODE- 250/636: Performed by: STUDENT IN AN ORGANIZED HEALTH CARE EDUCATION/TRAINING PROGRAM

## 2023-02-20 PROCEDURE — 85730 THROMBOPLASTIN TIME PARTIAL: CPT

## 2023-02-20 PROCEDURE — 87636 SARSCOV2 & INF A&B AMP PRB: CPT

## 2023-02-20 PROCEDURE — 83880 ASSAY OF NATRIURETIC PEPTIDE: CPT

## 2023-02-20 PROCEDURE — 85610 PROTHROMBIN TIME: CPT

## 2023-02-20 PROCEDURE — 65270000029 HC RM PRIVATE

## 2023-02-20 PROCEDURE — 74011000258 HC RX REV CODE- 258: Performed by: STUDENT IN AN ORGANIZED HEALTH CARE EDUCATION/TRAINING PROGRAM

## 2023-02-20 PROCEDURE — 99285 EMERGENCY DEPT VISIT HI MDM: CPT

## 2023-02-20 PROCEDURE — 84484 ASSAY OF TROPONIN QUANT: CPT

## 2023-02-20 PROCEDURE — 36415 COLL VENOUS BLD VENIPUNCTURE: CPT

## 2023-02-20 PROCEDURE — 71275 CT ANGIOGRAPHY CHEST: CPT

## 2023-02-20 PROCEDURE — 94640 AIRWAY INHALATION TREATMENT: CPT

## 2023-02-20 PROCEDURE — 80053 COMPREHEN METABOLIC PANEL: CPT

## 2023-02-20 RX ORDER — POLYETHYLENE GLYCOL 3350 17 G/17G
17 POWDER, FOR SOLUTION ORAL DAILY PRN
Status: DISCONTINUED | OUTPATIENT
Start: 2023-02-20 | End: 2023-02-21 | Stop reason: HOSPADM

## 2023-02-20 RX ORDER — SODIUM CHLORIDE 0.9 % (FLUSH) 0.9 %
5-40 SYRINGE (ML) INJECTION EVERY 8 HOURS
Status: DISCONTINUED | OUTPATIENT
Start: 2023-02-20 | End: 2023-02-21 | Stop reason: HOSPADM

## 2023-02-20 RX ORDER — IBUPROFEN 200 MG
1 TABLET ORAL
Status: DISCONTINUED | OUTPATIENT
Start: 2023-02-20 | End: 2023-02-21 | Stop reason: HOSPADM

## 2023-02-20 RX ORDER — GUAIFENESIN 100 MG/5ML
400 SOLUTION ORAL
Status: COMPLETED | OUTPATIENT
Start: 2023-02-20 | End: 2023-02-20

## 2023-02-20 RX ORDER — IPRATROPIUM BROMIDE AND ALBUTEROL SULFATE 2.5; .5 MG/3ML; MG/3ML
3 SOLUTION RESPIRATORY (INHALATION)
Status: DISCONTINUED | OUTPATIENT
Start: 2023-02-20 | End: 2023-02-21 | Stop reason: HOSPADM

## 2023-02-20 RX ORDER — ARFORMOTEROL TARTRATE 15 UG/2ML
15 SOLUTION RESPIRATORY (INHALATION)
Status: DISCONTINUED | OUTPATIENT
Start: 2023-02-20 | End: 2023-02-21 | Stop reason: HOSPADM

## 2023-02-20 RX ORDER — BUDESONIDE 0.5 MG/2ML
500 INHALANT ORAL
Status: DISCONTINUED | OUTPATIENT
Start: 2023-02-20 | End: 2023-02-21 | Stop reason: HOSPADM

## 2023-02-20 RX ORDER — SODIUM CHLORIDE 0.9 % (FLUSH) 0.9 %
5-40 SYRINGE (ML) INJECTION AS NEEDED
Status: DISCONTINUED | OUTPATIENT
Start: 2023-02-20 | End: 2023-02-21 | Stop reason: HOSPADM

## 2023-02-20 RX ORDER — AMOXICILLIN AND CLAVULANATE POTASSIUM 875; 125 MG/1; MG/1
1 TABLET, FILM COATED ORAL 2 TIMES DAILY
Qty: 14 TABLET | Refills: 0 | Status: SHIPPED | OUTPATIENT
Start: 2023-02-20 | End: 2023-02-27

## 2023-02-20 RX ORDER — POTASSIUM CHLORIDE 750 MG/1
40 TABLET, FILM COATED, EXTENDED RELEASE ORAL
Status: COMPLETED | OUTPATIENT
Start: 2023-02-20 | End: 2023-02-20

## 2023-02-20 RX ORDER — ONDANSETRON 2 MG/ML
4 INJECTION INTRAMUSCULAR; INTRAVENOUS
Status: DISCONTINUED | OUTPATIENT
Start: 2023-02-20 | End: 2023-02-21 | Stop reason: HOSPADM

## 2023-02-20 RX ORDER — AZITHROMYCIN 500 MG/1
500 TABLET, FILM COATED ORAL
Status: COMPLETED | OUTPATIENT
Start: 2023-02-20 | End: 2023-02-20

## 2023-02-20 RX ORDER — TAMSULOSIN HYDROCHLORIDE 0.4 MG/1
0.4 CAPSULE ORAL DAILY
Status: DISCONTINUED | OUTPATIENT
Start: 2023-02-21 | End: 2023-02-21 | Stop reason: HOSPADM

## 2023-02-20 RX ORDER — LANOLIN ALCOHOL/MO/W.PET/CERES
3 CREAM (GRAM) TOPICAL
Status: DISCONTINUED | OUTPATIENT
Start: 2023-02-20 | End: 2023-02-21 | Stop reason: HOSPADM

## 2023-02-20 RX ORDER — AMLODIPINE BESYLATE 5 MG/1
10 TABLET ORAL DAILY
Status: DISCONTINUED | OUTPATIENT
Start: 2023-02-21 | End: 2023-02-21 | Stop reason: HOSPADM

## 2023-02-20 RX ORDER — GABAPENTIN 100 MG/1
100 CAPSULE ORAL 3 TIMES DAILY
Status: DISCONTINUED | OUTPATIENT
Start: 2023-02-20 | End: 2023-02-21 | Stop reason: HOSPADM

## 2023-02-20 RX ORDER — ENOXAPARIN SODIUM 100 MG/ML
40 INJECTION SUBCUTANEOUS DAILY
Status: DISCONTINUED | OUTPATIENT
Start: 2023-02-21 | End: 2023-02-21 | Stop reason: HOSPADM

## 2023-02-20 RX ORDER — AZITHROMYCIN 250 MG/1
TABLET, FILM COATED ORAL
Qty: 4 TABLET | Refills: 0 | Status: SHIPPED | OUTPATIENT
Start: 2023-02-20 | End: 2023-02-25

## 2023-02-20 RX ORDER — ACETAMINOPHEN 650 MG/1
650 SUPPOSITORY RECTAL
Status: DISCONTINUED | OUTPATIENT
Start: 2023-02-20 | End: 2023-02-21 | Stop reason: HOSPADM

## 2023-02-20 RX ORDER — SODIUM CHLORIDE 0.9 % (FLUSH) 0.9 %
5-10 SYRINGE (ML) INJECTION
Status: COMPLETED | OUTPATIENT
Start: 2023-02-20 | End: 2023-02-20

## 2023-02-20 RX ORDER — HYDROCHLOROTHIAZIDE 25 MG/1
25 TABLET ORAL DAILY
Status: DISCONTINUED | OUTPATIENT
Start: 2023-02-21 | End: 2023-02-21 | Stop reason: HOSPADM

## 2023-02-20 RX ORDER — ACETAMINOPHEN 325 MG/1
650 TABLET ORAL
Status: DISCONTINUED | OUTPATIENT
Start: 2023-02-20 | End: 2023-02-21 | Stop reason: HOSPADM

## 2023-02-20 RX ORDER — ONDANSETRON 4 MG/1
4 TABLET, ORALLY DISINTEGRATING ORAL
Status: DISCONTINUED | OUTPATIENT
Start: 2023-02-20 | End: 2023-02-21 | Stop reason: HOSPADM

## 2023-02-20 RX ORDER — SODIUM CHLORIDE 9 MG/ML
125 INJECTION, SOLUTION INTRAVENOUS CONTINUOUS
Status: DISCONTINUED | OUTPATIENT
Start: 2023-02-20 | End: 2023-02-21 | Stop reason: HOSPADM

## 2023-02-20 RX ADMIN — DOXYCYCLINE 100 MG: 100 INJECTION, POWDER, LYOPHILIZED, FOR SOLUTION INTRAVENOUS at 23:25

## 2023-02-20 RX ADMIN — ARFORMOTEROL TARTRATE 15 MCG: 15 SOLUTION RESPIRATORY (INHALATION) at 23:29

## 2023-02-20 RX ADMIN — GABAPENTIN 100 MG: 100 CAPSULE ORAL at 22:58

## 2023-02-20 RX ADMIN — CEFEPIME 2 G: 2 INJECTION, POWDER, FOR SOLUTION INTRAVENOUS at 23:09

## 2023-02-20 RX ADMIN — GUAIFENESIN 400 MG: 200 SOLUTION ORAL at 16:59

## 2023-02-20 RX ADMIN — IOPAMIDOL 100 ML: 755 INJECTION, SOLUTION INTRAVENOUS at 17:34

## 2023-02-20 RX ADMIN — SODIUM CHLORIDE 125 ML/HR: 9 INJECTION, SOLUTION INTRAVENOUS at 23:08

## 2023-02-20 RX ADMIN — IPRATROPIUM BROMIDE AND ALBUTEROL SULFATE 3 ML: 2.5; .5 SOLUTION RESPIRATORY (INHALATION) at 23:29

## 2023-02-20 RX ADMIN — SODIUM CHLORIDE, PRESERVATIVE FREE 10 ML: 5 INJECTION INTRAVENOUS at 23:03

## 2023-02-20 RX ADMIN — FAMOTIDINE 20 MG: 10 INJECTION, SOLUTION INTRAVENOUS at 23:26

## 2023-02-20 RX ADMIN — BUDESONIDE 500 MCG: 0.5 SUSPENSION RESPIRATORY (INHALATION) at 23:30

## 2023-02-20 RX ADMIN — CEFTRIAXONE 2 G: 2 INJECTION, POWDER, FOR SOLUTION INTRAMUSCULAR; INTRAVENOUS at 19:08

## 2023-02-20 RX ADMIN — AZITHROMYCIN MONOHYDRATE 500 MG: 500 TABLET ORAL at 19:08

## 2023-02-20 RX ADMIN — POTASSIUM CHLORIDE 40 MEQ: 750 TABLET, FILM COATED, EXTENDED RELEASE ORAL at 22:58

## 2023-02-20 RX ADMIN — SODIUM CHLORIDE, PRESERVATIVE FREE 10 ML: 5 INJECTION INTRAVENOUS at 17:34

## 2023-02-20 RX ADMIN — METHYLPREDNISOLONE SODIUM SUCCINATE 125 MG: 125 INJECTION, POWDER, FOR SOLUTION INTRAMUSCULAR; INTRAVENOUS at 23:02

## 2023-02-20 NOTE — ED PROVIDER NOTES
EMERGENCY DEPARTMENT HISTORY AND PHYSICAL EXAM      Date: 2/20/2023  Patient Name: Huy Zurita    History of Presenting Illness     Chief Complaint   Patient presents with    Cough     History Provided By: Patient    HPI: Huy Zurita, 65 y.o. male with a past medical history significant for medical problems as stated below presents to the ED with cc of cough with blood-tinged sputum. Over the past few days he has felt extremely congested and feels that this is actually improving. He has had an occasional cough, that is largely nonproductive, but has been productive occasionally of clear sputum. He noticed some blood-tinged in his coughing once today. He smokes cigarettes. Does state he has a history of SOB, but had no worsened difficulty breathing. He has never had a blood clot before. Endorses other symptoms of chills over the past few days. In triage he had an O2 sat in the mid-80s, but this has improved prior to my exam without treatment or NC - he is not satting at 99%. PCP: Patti Orona., NP    No current facility-administered medications on file prior to encounter. Current Outpatient Medications on File Prior to Encounter   Medication Sig Dispense Refill    hydroCHLOROthiazide (HYDRODIURIL) 25 mg tablet 1 tab(s) orally once a day for 90 days      fluticasone-umeclidin-vilanter (Trelegy Ellipta) 200-62.5-25 mcg inhaler Take 1 Puff by inhalation daily. 1 Blister 1    tamsulosin (FLOMAX) 0.4 mg capsule Take 1 Capsule by mouth daily. 90 Capsule 0    gabapentin (NEURONTIN) 100 mg capsule Take 1 Capsule by mouth three (3) times daily. Max Daily Amount: 300 mg. 90 Capsule 0    ammonium lactate (Lac-Hydrin Five) 5 % lotion Apply  to affected area as needed (dry skin). 120 mL 1    albuterol (PROVENTIL HFA, VENTOLIN HFA, PROAIR HFA) 90 mcg/actuation inhaler Take 1 Puff by inhalation every six (6) hours as needed for Wheezing or Shortness of Breath.  1 Each 1    amLODIPine (NORVASC) 10 mg tablet TAKE 1 TABLET BY MOUTH EVERY DAY FOR BLOOD PRESSURE 90 Tablet 1       Past History     Past Medical History:  Past Medical History:   Diagnosis Date    Hypertension        Past Surgical History:  Past Surgical History:   Procedure Laterality Date    COLONOSCOPY N/A 10/27/2020    COLONOSCOPY performed by Ilene Mendez MD at [facility]      [address]  8/2010    NONEMERG TRANSP MILEAGE         Family History:  History reviewed. No pertinent family history. Social History:  Social History     Tobacco Use    Smoking status: Every Day     Packs/day: 1.00     Types: Cigarettes    Smokeless tobacco: Never   Vaping Use    Vaping Use: Never used   Substance Use Topics    Alcohol use: No    Drug use: No       Allergies: Allergies   Allergen Reactions    Chantix [Varenicline] Other (comments)     nightmares         Review of Systems   Review of Systems  Per HPI    Physical Exam   Physical Exam  Vital signs reviewed and documented in EMR  Nontoxic and well-appearing  No acute distress  No tachycardia, no murmurs  CTAB, no incr wob, no wheezing, no acc muscle use  Abdomen nondistended, soft, nontender  No focal motor deficits  No periphweral edema    Diagnostic Study Results     Labs -     Recent Results (from the past 24 hour(s))   CBC WITH AUTOMATED DIFF    Collection Time: 02/20/23  4:30 PM   Result Value Ref Range    WBC 13.0 (H) 4.1 - 11.1 K/uL    RBC 4.86 4.10 - 5.70 M/uL    HGB 13.3 12.1 - 17.0 g/dL    HCT 43.5 36.6 - 50.3 %    MCV 89.5 80.0 - 99.0 FL    MCH 27.4 26.0 - 34.0 PG    MCHC 30.6 30.0 - 36.5 g/dL    RDW 13.8 11.5 - 14.5 %    PLATELET 471 325 - 365 K/uL    MPV 9.9 8.9 - 12.9 FL    NRBC 0.0 0  WBC    ABSOLUTE NRBC 0.00 0.00 - 0.01 K/uL    NEUTROPHILS 82 (H) 32 - 75 %    LYMPHOCYTES 10 (L) 12 - 49 %    MONOCYTES 7 5 - 13 %    EOSINOPHILS 1 0 - 7 %    BASOPHILS 0 0 - 1 %    IMMATURE GRANULOCYTES 0 0.0 - 0.5 %    ABS. NEUTROPHILS 10.5 (H) 1.8 - 8.0 K/UL    ABS.  LYMPHOCYTES 1.3 0.8 - 3.5 K/UL    ABS. MONOCYTES 0.9 0.0 - 1.0 K/UL    ABS. EOSINOPHILS 0.2 0.0 - 0.4 K/UL    ABS. BASOPHILS 0.0 0.0 - 0.1 K/UL    ABS. IMM. GRANS. 0.1 (H) 0.00 - 0.04 K/UL    DF AUTOMATED     METABOLIC PANEL, COMPREHENSIVE    Collection Time: 02/20/23  4:30 PM   Result Value Ref Range    Sodium 142 136 - 145 mmol/L    Potassium 3.4 (L) 3.5 - 5.1 mmol/L    Chloride 103 97 - 108 mmol/L    CO2 32 21 - 32 mmol/L    Anion gap 7 5 - 15 mmol/L    Glucose 133 (H) 65 - 100 mg/dL    BUN 13 6 - 20 MG/DL    Creatinine 1.34 (H) 0.70 - 1.30 MG/DL    BUN/Creatinine ratio 10 (L) 12 - 20      eGFR >60 >60 ml/min/1.73m2    Calcium 8.8 8.5 - 10.1 MG/DL    Bilirubin, total 0.6 0.2 - 1.0 MG/DL    ALT (SGPT) 38 12 - 78 U/L    AST (SGOT) 32 15 - 37 U/L    Alk. phosphatase 95 45 - 117 U/L    Protein, total 6.9 6.4 - 8.2 g/dL    Albumin 3.3 (L) 3.5 - 5.0 g/dL    Globulin 3.6 2.0 - 4.0 g/dL    A-G Ratio 0.9 (L) 1.1 - 2.2     PROTHROMBIN TIME + INR    Collection Time: 02/20/23  4:30 PM   Result Value Ref Range    INR 1.1 0.9 - 1.1      Prothrombin time 11.0 9.0 - 11.1 sec   TROPONIN-HIGH SENSITIVITY    Collection Time: 02/20/23  4:30 PM   Result Value Ref Range    Troponin-High Sensitivity 10 0 - 76 ng/L   NT-PRO BNP    Collection Time: 02/20/23  4:30 PM   Result Value Ref Range    NT pro- (H) 0 - 125 PG/ML   PTT    Collection Time: 02/20/23  4:30 PM   Result Value Ref Range    aPTT 22.3 22.1 - 31.0 sec    aPTT, therapeutic range     58.0 - 77.0 SECS   COVID-19 WITH INFLUENZA A/B    Collection Time: 02/20/23  4:30 PM   Result Value Ref Range    SARS-CoV-2 by PCR Not detected NOTD      Influenza A by PCR Not detected      Influenza B by PCR Not detected         Radiologic Studies -   CTA CHEST W OR W WO CONT   Final Result   1. No evidence of pulmonary embolism. 2. Left lower lobe pneumonia. 3. Centrilobular emphysema.            CT Results  (Last 48 hours)                 02/20/23 5155  CTA CHEST W OR W WO CONT Final result Impression:  1. No evidence of pulmonary embolism. 2. Left lower lobe pneumonia. 3. Centrilobular emphysema. Narrative:  EXAM:  CTA CHEST W OR W WO CONT       INDICATION: PE       COMPARISON: Chest CT November 2022       TECHNIQUE: Helical thin section chest CT following uneventful intravenous   administration of nonionic contrast 100 mL of isovue 370 according to   departmental PE protocol. Coronal and sagittal reformats were performed. 3D post   processing was performed. CT dose reduction was achieved through the use of a   standardized protocol tailored for this examination and automatic exposure   control for dose modulation. FINDINGS: This is a good quality study for the evaluation of pulmonary embolism   to the first subsegmental arterial level. There is no pulmonary embolism to this   level. THYROID: No nodule. MEDIASTINUM: Mild reactive lymphadenopathy   LV: Reactive left hilar lymphadenopathy   THORACIC AORTA: No aneurysm. HEART: Normal in size. ESOPHAGUS: No wall thickening or dilatation. TRACHEA/BRONCHI: Patent. PLEURA: No effusion or pneumothorax. LUNGS: Centrilobular emphysema. Multifocal left lower lobe consolidation. UPPER ABDOMEN: Partially imaged. No acute pathology. BONES: No aggressive bone lesion or fracture. CXR Results  (Last 48 hours)      None          Medical Decision Making   I am the first provider for this patient. I reviewed the vital signs, available nursing notes, past medical history, past surgical history, family history and social history. Vital Signs-Reviewed the patient's vital signs.   Patient Vitals for the past 12 hrs:   Temp Pulse Resp BP SpO2   02/20/23 2050 -- 70 19 -- 95 %   02/20/23 2003 -- 63 12 109/76 --   02/20/23 1919 -- -- -- -- 94 %   02/20/23 1831 -- 70 12 112/69 --   02/20/23 1635 -- 80 14 -- 95 %   02/20/23 1626 -- -- -- -- 94 %   02/20/23 1624 -- -- -- -- 92 %   02/20/23 1613 98.5 °F (36.9 °C) 82 14 114/73 (!) 85 %     Records Reviewed: Nursing records and medical records reviewed    Medical Decision Making  Patient is a 58-year-old male with past medical history of COPD and tobacco use disorder who presents to the emergency department with symptoms of cough that is occasionally productive. Also with some blood-tinged sputum earlier today which is the main reason why he came to the emergency department. He reports no increased difficulty breathing than usual.  He does have history of COPD. My largest concern is for a blood clot with his hypoxia in triage and report of hemoptysis. He is most concerned for the possibility of infection which I think is concerning as well. He is noted to have a leukocytosis in the emergency department. No fever. We will obtain a CTA which should provide more imaging for both VTE as well as possible pneumonia. We will obtain COVID test as his symptoms do seem viral in nature overall. Doubt systemic infection with lack of fever and well appearance. No shortness of breath and no wheezing - doubt COPD exacerbation - do not think patient needs breathing treatments acutely of steroids. Will reassess following remainder of workup. The patient was counseled on the dangers of tobacco use, and was advised to quit. Reviewed strategies to maximize success, including removing cigarettes and smoking materials from environment      Amount and/or Complexity of Data Reviewed  Labs: ordered. Decision-making details documented in ED Course. Radiology: ordered and independent interpretation performed. Decision-making details documented in ED Course. Details: Noticed consolidation in left lower lobe on CT imaging. Radiology agrees    Risk  OTC drugs. Prescription drug management. Decision regarding hospitalization. Risk Details: Discussion with Dr. Ministerio Trejo regarding hospitalization. ED Course:   Initial assessment performed.  The patients presenting problems have been discussed, and they are in agreement with the care plan formulated and outlined with them. I have encouraged them to ask questions as they arise throughout their visit. ED Course as of 02/20/23 2157   Mon Feb 20, 2023   3552 Patient with findings of left lower lobe pneumonia on CTA imaging, no PE. We will treat initially here with IV ceftriaxone and p.o. azithromycin. Recent QTc not prolonged. Will obtain wlking pulsox and if negative will plan for discharge after abx. Discussed this plan with the patient. He voiced understanding and agreed to customary return precautions.   [WB]   1954 On ambulation patient dropped to 86%. He improved to 94% at rest while on room air. I discussed that with his hypoxia and his pneumonia that I would recommend admission. Patient agreeable. [WB]   2043 Patient continued to desat to 86%. He was placed on 2 L nasal cannula and his oxygenation has improved. He is admitted to the hospitals medicine service at this time under the care of Dr. Maged Horton. [WB]      ED Course User Index  [WB] Kulwant Evans MD     Critical Care:  None    Disposition:  Admitted    Diagnosis     Clinical Impression:   1. Community acquired pneumonia of left lower lobe of lung    2. Tobacco use    3. Acute respiratory failure with hypoxia (HCC)        Attestations:    Sherif Ace MD    Please note that this dictation was completed with [employer], the computer voice recognition software. Quite often unanticipated grammatical, syntax, homophones, and other interpretive errors are inadvertently transcribed by the computer software. Please disregard these errors. Please excuse any errors that have escaped final proofreading. Thank you.

## 2023-02-20 NOTE — ED TRIAGE NOTES
Reports cough and cold symptoms. Reports blood tinged mucus yesterday and today. States he has had headache also.

## 2023-02-20 NOTE — ED NOTES
Pt presents to ED complaining of blood tinged sputum x 3 days. Pt states that the sputum was initially dark red, but has progressed to a lighter pink. Pt denies taking medication for relief. Pt is alert and oriented x 4, RR even and unlabored, skin is warm and dry. Assessment completed and pt updated on plan of care. Call bell in reach. Emergency Department Nursing Plan of Care       The Nursing Plan of Care is developed from the Nursing assessment and Emergency Department Attending provider initial evaluation. The plan of care may be reviewed in the ED Provider note.     The Plan of Care was developed with the following considerations:   Patient / Family readiness to learn indicated by:verbalized understanding  Persons(s) to be included in education: patient  Barriers to Learning/Limitations:No    Signed     Tania Neri RN    2/20/2023

## 2023-02-21 VITALS
WEIGHT: 173.9 LBS | OXYGEN SATURATION: 97 % | SYSTOLIC BLOOD PRESSURE: 131 MMHG | HEIGHT: 69 IN | TEMPERATURE: 98 F | HEART RATE: 66 BPM | DIASTOLIC BLOOD PRESSURE: 68 MMHG | RESPIRATION RATE: 20 BRPM | BODY MASS INDEX: 25.76 KG/M2

## 2023-02-21 LAB
ANION GAP SERPL CALC-SCNC: 2 MMOL/L (ref 5–15)
BASOPHILS # BLD: 0 K/UL (ref 0–0.1)
BASOPHILS NFR BLD: 0 % (ref 0–1)
BUN SERPL-MCNC: 12 MG/DL (ref 6–20)
BUN/CREAT SERPL: 11 (ref 12–20)
CALCIUM SERPL-MCNC: 8.5 MG/DL (ref 8.5–10.1)
CHLORIDE SERPL-SCNC: 105 MMOL/L (ref 97–108)
CO2 SERPL-SCNC: 33 MMOL/L (ref 21–32)
CREAT SERPL-MCNC: 1.1 MG/DL (ref 0.7–1.3)
DIFFERENTIAL METHOD BLD: ABNORMAL
EOSINOPHIL # BLD: 0 K/UL (ref 0–0.4)
EOSINOPHIL NFR BLD: 0 % (ref 0–7)
ERYTHROCYTE [DISTWIDTH] IN BLOOD BY AUTOMATED COUNT: 13.9 % (ref 11.5–14.5)
GLUCOSE SERPL-MCNC: 142 MG/DL (ref 65–100)
HCT VFR BLD AUTO: 43.7 % (ref 36.6–50.3)
HGB BLD-MCNC: 13 G/DL (ref 12.1–17)
IMM GRANULOCYTES # BLD AUTO: 0 K/UL (ref 0–0.04)
IMM GRANULOCYTES NFR BLD AUTO: 0 % (ref 0–0.5)
LYMPHOCYTES # BLD: 0.5 K/UL (ref 0.8–3.5)
LYMPHOCYTES NFR BLD: 5 % (ref 12–49)
MAGNESIUM SERPL-MCNC: 1.8 MG/DL (ref 1.6–2.4)
MCH RBC QN AUTO: 26.3 PG (ref 26–34)
MCHC RBC AUTO-ENTMCNC: 29.7 G/DL (ref 30–36.5)
MCV RBC AUTO: 88.5 FL (ref 80–99)
MONOCYTES # BLD: 0.1 K/UL (ref 0–1)
MONOCYTES NFR BLD: 1 % (ref 5–13)
NEUTS SEG # BLD: 8.5 K/UL (ref 1.8–8)
NEUTS SEG NFR BLD: 94 % (ref 32–75)
NRBC # BLD: 0 K/UL (ref 0–0.01)
NRBC BLD-RTO: 0 PER 100 WBC
PLATELET # BLD AUTO: 146 K/UL (ref 150–400)
PMV BLD AUTO: 10.3 FL (ref 8.9–12.9)
POTASSIUM SERPL-SCNC: 4.8 MMOL/L (ref 3.5–5.1)
RBC # BLD AUTO: 4.94 M/UL (ref 4.1–5.7)
RBC MORPH BLD: ABNORMAL
SODIUM SERPL-SCNC: 140 MMOL/L (ref 136–145)
WBC # BLD AUTO: 9.1 K/UL (ref 4.1–11.1)

## 2023-02-21 PROCEDURE — 94640 AIRWAY INHALATION TREATMENT: CPT

## 2023-02-21 PROCEDURE — 74011250636 HC RX REV CODE- 250/636: Performed by: STUDENT IN AN ORGANIZED HEALTH CARE EDUCATION/TRAINING PROGRAM

## 2023-02-21 PROCEDURE — 36415 COLL VENOUS BLD VENIPUNCTURE: CPT

## 2023-02-21 PROCEDURE — 77010033678 HC OXYGEN DAILY

## 2023-02-21 PROCEDURE — 74011636637 HC RX REV CODE- 636/637: Performed by: STUDENT IN AN ORGANIZED HEALTH CARE EDUCATION/TRAINING PROGRAM

## 2023-02-21 PROCEDURE — 74011250637 HC RX REV CODE- 250/637: Performed by: STUDENT IN AN ORGANIZED HEALTH CARE EDUCATION/TRAINING PROGRAM

## 2023-02-21 PROCEDURE — 74011000250 HC RX REV CODE- 250: Performed by: STUDENT IN AN ORGANIZED HEALTH CARE EDUCATION/TRAINING PROGRAM

## 2023-02-21 PROCEDURE — 96372 THER/PROPH/DIAG INJ SC/IM: CPT

## 2023-02-21 PROCEDURE — 85025 COMPLETE CBC W/AUTO DIFF WBC: CPT

## 2023-02-21 PROCEDURE — 94760 N-INVAS EAR/PLS OXIMETRY 1: CPT

## 2023-02-21 PROCEDURE — 77030029684 HC NEB SM VOL KT MONA -A

## 2023-02-21 PROCEDURE — 96376 TX/PRO/DX INJ SAME DRUG ADON: CPT

## 2023-02-21 PROCEDURE — 83735 ASSAY OF MAGNESIUM: CPT

## 2023-02-21 PROCEDURE — 74011000258 HC RX REV CODE- 258: Performed by: STUDENT IN AN ORGANIZED HEALTH CARE EDUCATION/TRAINING PROGRAM

## 2023-02-21 PROCEDURE — G0378 HOSPITAL OBSERVATION PER HR: HCPCS

## 2023-02-21 PROCEDURE — 97161 PT EVAL LOW COMPLEX 20 MIN: CPT | Performed by: PHYSICAL THERAPIST

## 2023-02-21 PROCEDURE — 80048 BASIC METABOLIC PNL TOTAL CA: CPT

## 2023-02-21 PROCEDURE — 97116 GAIT TRAINING THERAPY: CPT | Performed by: PHYSICAL THERAPIST

## 2023-02-21 RX ORDER — GUAIFENESIN 600 MG/1
600 TABLET, EXTENDED RELEASE ORAL EVERY 12 HOURS
Qty: 20 TABLET | Refills: 0 | Status: SHIPPED | OUTPATIENT
Start: 2023-02-21 | End: 2023-03-03

## 2023-02-21 RX ORDER — GUAIFENESIN 600 MG/1
600 TABLET, EXTENDED RELEASE ORAL EVERY 12 HOURS
Status: DISCONTINUED | OUTPATIENT
Start: 2023-02-21 | End: 2023-02-21 | Stop reason: HOSPADM

## 2023-02-21 RX ORDER — ALBUTEROL SULFATE 90 UG/1
1 AEROSOL, METERED RESPIRATORY (INHALATION)
COMMUNITY
End: 2023-03-02 | Stop reason: SDUPTHER

## 2023-02-21 RX ORDER — PREDNISONE 20 MG/1
40 TABLET ORAL
Status: DISCONTINUED | OUTPATIENT
Start: 2023-02-21 | End: 2023-02-21 | Stop reason: HOSPADM

## 2023-02-21 RX ORDER — DOXYCYCLINE HYCLATE 100 MG
100 TABLET ORAL EVERY 12 HOURS
Status: DISCONTINUED | OUTPATIENT
Start: 2023-02-21 | End: 2023-02-21 | Stop reason: HOSPADM

## 2023-02-21 RX ORDER — PREDNISONE 20 MG/1
40 TABLET ORAL
Qty: 8 TABLET | Refills: 0 | Status: SHIPPED | OUTPATIENT
Start: 2023-02-22 | End: 2023-02-26

## 2023-02-21 RX ORDER — FLUTICASONE PROPIONATE AND SALMETEROL 250; 50 UG/1; UG/1
1 POWDER RESPIRATORY (INHALATION) EVERY 12 HOURS
COMMUNITY
End: 2023-03-02 | Stop reason: SDUPTHER

## 2023-02-21 RX ADMIN — CEFEPIME 2 G: 2 INJECTION, POWDER, FOR SOLUTION INTRAVENOUS at 09:40

## 2023-02-21 RX ADMIN — ARFORMOTEROL TARTRATE 15 MCG: 15 SOLUTION RESPIRATORY (INHALATION) at 08:34

## 2023-02-21 RX ADMIN — BUDESONIDE 500 MCG: 0.5 SUSPENSION RESPIRATORY (INHALATION) at 08:34

## 2023-02-21 RX ADMIN — FAMOTIDINE 20 MG: 10 INJECTION, SOLUTION INTRAVENOUS at 08:27

## 2023-02-21 RX ADMIN — HYDROCHLOROTHIAZIDE 25 MG: 25 TABLET ORAL at 08:27

## 2023-02-21 RX ADMIN — GABAPENTIN 100 MG: 100 CAPSULE ORAL at 08:26

## 2023-02-21 RX ADMIN — AMLODIPINE BESYLATE 10 MG: 5 TABLET ORAL at 08:27

## 2023-02-21 RX ADMIN — IPRATROPIUM BROMIDE AND ALBUTEROL SULFATE 3 ML: 2.5; .5 SOLUTION RESPIRATORY (INHALATION) at 08:35

## 2023-02-21 RX ADMIN — TAMSULOSIN HYDROCHLORIDE 0.4 MG: 0.4 CAPSULE ORAL at 08:27

## 2023-02-21 RX ADMIN — DOXYCYCLINE HYCLATE 100 MG: 100 TABLET, COATED ORAL at 09:39

## 2023-02-21 RX ADMIN — PREDNISONE 40 MG: 20 TABLET ORAL at 09:39

## 2023-02-21 RX ADMIN — IPRATROPIUM BROMIDE AND ALBUTEROL SULFATE 3 ML: 2.5; .5 SOLUTION RESPIRATORY (INHALATION) at 14:23

## 2023-02-21 NOTE — DISCHARGE SUMMARY
Discharge Summary   Please note that this dictation was completed with [employer], the computer voice recognition software. Quite often unanticipated grammatical, syntax, homophones, and other interpretive errors are inadvertently transcribed by the computer software. Please disregard these errors. Please excuse any errors that have escaped final proofreading. PATIENT ID: Ricarda Caldwell  MRN: [id number]   YOB: 1960    DATE OF ADMISSION: 2/20/2023  4:17 PM    DATE OF DISCHARGE: 2/21/2023  PRIMARY CARE PROVIDER: Lorena Valentine, NP         ATTENDING PHYSICIAN: Marcus Auguste MD  DISCHARGING PROVIDER: Marcus Auguste MD       CONSULTATIONS: None    PROCEDURES/SURGERIES: * No surgery found *    ADMITTING HPI from excerpted H&P   Ricarda Caldwell is a 65 y.o. male with copd, htn, tobacco use, BPH who presents to hospital with complaints of dyspnea. He reports worsening dyspnea with very minimal exertion over the last week. Developed a cough over the last 3 days which initially was nonproductive. Today, he noted blood-tinged sputum which prompted him to seek emergency room care. States he is overall active and healthy but is a daily smoker. The patient denies any fever, chills, chest or abdominal pain, nausea, vomiting congestion, recent illness, palpitations, or dysuria. Remarkable vitals on ER Presentation: spo2 86% on RA  Labs Remarkable for: WBC 13,   ER Images: CT chest showed centrilobular emphysema and left lower lobe pneumonia.   No PE  ER treatment-Rocephin and azithromycin, saline bolus        HOSPITAL COURSE & DISCHARGE DIAGNOSIS/ PLAN:     #Acute hypoxic respiratory failure due to advanced emphysema stable on supplemental oxygen  #Community-acquired pneumonia improved  #COPD exacerbation improved  #Productive cough with blood-tinged sputum improved due to pneumonia/COPD  -CTA reviewed independently no pulmonary embolism left lower lobe pneumonia Central lobar emphysema. -COVID-19 influenza negative  -Globin is stable  -6-minute walk test demonstrated patient required supplemental oxygen exertion and at rest  -Patient be discharged home on azithromycin and Augmentin for Treatment prescribed earlier by ER physician. Patient to be discharged on Robitussin Mucinex Spiriva albuterol Advair for COPD treatment  -Follow-up with PCP and pulmonary for management of hypoxia aspiratory failure and advanced age emphysema  - Patient agreed and verbalized understanding          #History of hypertension  #History of BPH  -Resume home regimen as tolerated     #Tobacco use  - Patient was counseled extensively on the need to abstain from tobacco, its addictive tendencies, its deleterious effects on the lungs, cardiovascular  as well as its financial & social sequelae            PENDING TEST RESULTS:   At the time of discharge the following test results are still pending: None    FOLLOW UP APPOINTMENTS:    Follow-up Information       Follow up With Specialties Details Why Contact Allyn Rose NP Nurse Practitioner Follow up on 3/2/2023 [facility] Discharge Follow Up at [address] [facility]  [address] [facility] [address] [facility].  Follow up Provider of your home oxygen [address] [place] [address]  [phone]             ADDITIONAL CARE RECOMMENDATIONS: With PCP and advised smoking    DIET: Regular Diet    ACTIVITY: Activity as tolerated    WOUND CARE: None Home oxygen    EQUIPMENT needed: Oxygen      DISCHARGE MEDICATIONS:  Current Discharge Medication List        START taking these medications    Details   predniSONE (DELTASONE) 20 mg tablet Take 2 Tablets by mouth daily (with breakfast) for 4 doses.   Qty: 8 Tablet, Refills: 0  Start date: 2/22/2023, End date: 2/26/2023      dextromethorphan hbr (ROBITUSSIN MAXIMUM STRENGTH) 15 mg/5 mL syrup Take 10 mL by mouth four (4) times daily as needed for Cough for up to 10 days.  Qty: 1 Each, Refills: 0  Start date: 2/21/2023, End date: 3/3/2023      tiotropium bromide (Spiriva Respimat) 2.5 mcg/actuation inhaler Take 2 Puffs by inhalation daily. Qty: 4 g, Refills: 0  Start date: 2/21/2023      guaiFENesin ER (MUCINEX) 600 mg ER tablet Take 1 Tablet by mouth every twelve (12) hours for 10 days. Qty: 20 Tablet, Refills: 0  Start date: 2/21/2023, End date: 3/3/2023      amoxicillin-clavulanate (Augmentin) 875-125 mg per tablet Take 1 Tablet by mouth two (2) times a day for 7 days. Qty: 14 Tablet, Refills: 0  Start date: 2/20/2023, End date: 2/27/2023      azithromycin (Zithromax Z-Rufino) 250 mg tablet Please take one tab daily  Qty: 4 Tablet, Refills: 0  Start date: 2/20/2023, End date: 2/25/2023           CONTINUE these medications which have NOT CHANGED    Details   albuterol (PROVENTIL HFA, VENTOLIN HFA, PROAIR HFA) 90 mcg/actuation inhaler Take 1 Puff by inhalation daily as needed for Wheezing. fluticasone propion-salmeteroL (ADVAIR/WIXELA) 250-50 mcg/dose diskus inhaler Take 1 Puff by inhalation every twelve (12) hours. amLODIPine (NORVASC) 10 mg tablet TAKE 1 TABLET BY MOUTH EVERY DAY FOR BLOOD PRESSURE  Qty: 90 Tablet, Refills: 1    Associated Diagnoses: Essential hypertension           STOP taking these medications       hydroCHLOROthiazide (HYDRODIURIL) 25 mg tablet Comments:   Reason for Stopping:         tamsulosin (FLOMAX) 0.4 mg capsule Comments:   Reason for Stopping:         gabapentin (NEURONTIN) 100 mg capsule Comments:   Reason for Stopping:                 NOTIFY YOUR PHYSICIAN FOR ANY OF THE FOLLOWING:   Fever over 101 degrees for 24 hours. Chest pain, shortness of breath, fever, chills, nausea, vomiting, diarrhea, change in mentation, falling, weakness, bleeding. Severe pain or pain not relieved by medications. Or, any other signs or symptoms that you may have questions about.     DISPOSITION:    Home With:   OT  PT  HH  RN       Long term SNF/Inpatient Rehab   x Independent/assisted living    Hospice    Other:       PATIENT CONDITION AT DISCHARGE:     Functional status    Poor     Deconditioned    x Independent      Cognition   x  Lucid     Forgetful     Dementia      Catheters/lines (plus indication)    Kaufman     PICC     PEG    x None      Code status   x  Full code     DNR      PHYSICAL EXAMINATION AT DISCHARGE:    General : alert x 3, awake, no acute distress,   HEENT: PEERL, EOMI, moist mucus membrane, TM clear  Neck: supple, no JVD, no meningeal signs  Chest: Clear to auscultation bilaterally   CVS: S1 S2 heard, Capillary refill less than 2 seconds  Abd: soft/ Non tender, non distended, BS physiological,   Ext: no clubbing, no cyanosis, no edema, brisk 2+ DP pulses  Neuro/Psych: pleasant mood and affect, CN 2-12 grossly intact, sensory grossly within normal limit, Strength 5/5 in all extremities, DTR 1+ x 4  Skin: warm     CHRONIC MEDICAL DIAGNOSES:  Problem List as of 2/21/2023 Date Reviewed: 12/1/2022            Codes Class Noted - Resolved    CAP (community acquired pneumonia) ICD-10-CM: J18.9  ICD-9-CM: 486  2/20/2023 - Present        Respiratory failure with hypoxia ([facility] [address] ICD-10-CM: J96.91  ICD-9-CM: 518.81  2/20/2023 - Present        Benign prostatic hyperplasia with lower urinary tract symptoms ICD-10-CM: N40.1  ICD-9-CM: 600.01  12/1/2022 - Present        Lower urinary tract symptoms due to benign prostatic hyperplasia ICD-10-CM: N40.1  ICD-9-CM: 600.01  3/17/2022 - Present        Hypertension ICD-10-CM: I10  ICD-9-CM: 401.9  Unknown - Present        RESOLVED: Pharyngitis, acute ICD-10-CM: J02.9  ICD-9-CM: 757  2/18/2011 - 5/20/2021           Greater than 50 minutes were spent with the patient on counseling and coordination of care    Signed:   Lorena Goel MD  2/21/2023  2:40 PM

## 2023-02-21 NOTE — PROGRESS NOTES
Day #1 of Cefepime  Indication:  CAP  Current regimen:  1G IV q12h  Abx regimen: Cefepime + Doxy  Recent Labs     23  1630   WBC 13.0*   CREA 1.34*   BUN 13     Est CrCl: 57.9 ml/min; UO: -- ml/kg/hr  Temp (24hrs), Av.5 °F (36.9 °C), Min:98.5 °F (36.9 °C), Max:98.5 °F (36.9 °C)    Cultures: --    Plan: Change to 2G IV Q12h for CrCl 30-60 ml/min

## 2023-02-21 NOTE — DISCHARGE INSTRUCTIONS
Please make a followup with your primary care physician. If you have any new or worsening concerning medical symptoms please return to the emergency department.   - Please complete antibiotic course and prednisone course   - Follow up PCP and Pulmonology for further management of respiratory failure
What Is The Reason For Today's Visit?: Full Body Skin Examination
What Is The Reason For Today's Visit? (Being Monitored For X): the development of new lesions

## 2023-02-21 NOTE — PROGRESS NOTES
TRANSFER - IN REPORT:    Verbal report received from [address] (name) on Su Pearce  being received from ED (unit) for routine progression of care      Report consisted of patients Situation, Background, Assessment and   Recommendations(SBAR). Information from the following report(s) SBAR, Kardex, MAR, and Recent Results was reviewed with the receiving nurse. Opportunity for questions and clarification was provided. Assessment completed upon patients arrival to unit and care assumed. 4195: Bedside and Verbal shift change report given to Luis Antonio King RN (oncoming nurse) by Maddie Nur RN (offgoing nurse). Report included the following information SBAR, Kardex, MAR, and Recent Results.

## 2023-02-21 NOTE — PROGRESS NOTES
Floor Rounds    Problems:  Acute hypoxic respiratory failure 2/2 CAP  COPD exacerbation  Hypertension   Hypokalemia   BPH   Tobacco use      [facility] Review:   Anticoagulation: E40 Acid suppressant(s):none Nutrition:Regular     Current or PTA Respiratory Medications: albut/ipratropium / arformoterol and budesonide  Inhaler for suspected or confirmed COVID-19?: No   Renal/  hepatic CrCl: Estimated Creatinine Clearance: 70.5 mL/min (based on SCr of 1.1 mg/dL). SCr:   Lab Results   Component Value Date/Time    Creatinine 1.10 02/21/2023 04:04 AM     Renally Adjusted Medications: cefepime   Cardiac   Cardiac Drips Discontinued: Not applicable  Cardiac history (ex. CVA/TIA, SHF):no  If yes, meds & control: n/a  QTc: 412   Pain Control/Bowel Regimen Acetaminophen/ polyethylene glycol   ID  Antibiotics listed:   cefepime, doxycycline       Coverage Appropriate? yes  Double Coverage? no  Duration of therapy: 5-7 days  Anti-MRSA or echinocandin coverage appropriate? no  C. diff? no  COVID-19? NO   Endocrine Diabetes coverage? no  Thyroid coverage? no   Psychiatry Psychiatric Medication:  no  UDS: n/a   Electrolytes & other lab results [id number], NT-pro- [address] Rec: YES  Medications Resumed: some   Miscellaneous Notes Duplicate indications?  NO     Core Measures  CVA/TIA: Antiplatelet (by end of hospital day 2 and on discharge; wait 24 hours if given alteplase) + statin (high intensity i.e. atorvastatin 40-80 mg) +  (Anticoagulation for A-Fib)  Heart Failure: Beta-blocker, ACE/ARB (LVEF < 40%)

## 2023-02-21 NOTE — PROGRESS NOTES
PHYSICAL THERAPY EVALUATION/DISCHARGE  Patient: Luly Luo (15 y.o. male)  Date: 2/21/2023  Primary Diagnosis: Respiratory failure with hypoxia ([address] [J96.91]  CAP (community acquired pneumonia) [J18.9]       Precautions: O2 levels         ASSESSMENT  Based on the objective data described below, the patient presents with hypoxia and dyspnea upon exertion. Patient received supine in bed without nasal canula in place. Patient was educated on the importance of using canula while his O2 levels remain low on room air. Patient able to perform functional mobility tasks independently, but required rest breaks and education on pursed lip breathing to maintain O2 levels >90%. While using canula with appropriate breathing techniques patient able to maintain 95% O2 sats in sitting and standing. Patient has difficulty completing full expiration even with cues for pursed lip breathing. Patient has no mobility deficits limiting his level of function and will benefit from continued education and respiratory intervention to allow for improved O2 sats with activity. Functional Outcome Measure: The patient scored 53/56 on the Alvarez outcome measure which is indicative of low fall risk. Other factors to consider for discharge: need for O2 at home, need for respiratory intervention at home     Further skilled acute physical therapy is not indicated at this time. PLAN :  Recommendation for discharge: (in order for the patient to meet his/her long term goals)  No skilled physical therapy/ follow up rehabilitation needs identified at this time. This discharge recommendation:  Has been made in collaboration with the attending provider and/or case management    IF patient discharges home will need the following DME: none       SUBJECTIVE:   Patient stated I have always been like this. It's not going to help, the damage has been done from the cigarettes.     OBJECTIVE DATA SUMMARY:   HISTORY:    Past Medical History: Diagnosis Date    Hypertension      Past Surgical History:   Procedure Laterality Date    COLONOSCOPY N/A 10/27/2020    COLONOSCOPY performed by Jaye Mckenzie MD at [facility] MAIN OR    HX HEENT      HX HERNIA REPAIR  2010    NONEMERG TRANSP MILEAGE       Personal factors and/or comorbidities impacting plan of care:   Home Situation  Home Environment: Private residence  One/Two Story Residence: One story  Living Alone: No  Support Systems: Other Family Member(s), Spouse/Significant Other  Patient Expects to be Discharged to[de-identified] Home  Current DME Used/Available at Home: None    EXAMINATION/PRESENTATION/DECISION MAKING:   Critical Behavior:  Neurologic State: Alert, Eyes open spontaneously  Orientation Level: Appropriate for age, Oriented X4  Cognition: Appropriate decision making, Appropriate for age attention/concentration, Appropriate safety awareness, Follows commands  Range Of Motion:  AROM: Within functional limits  Strength:    Strength:  Within functional limits  Functional Mobility:  Bed Mobility:  Rolling: Independent  Supine to Sit: Independent  Sit to Supine: Independent  Scooting: Independent  Transfers:  Sit to Stand: Independent  Stand to Sit: Independent  Balance:   Sitting: Intact  Standing: Intact  Ambulation/Gait Training:  Gait Description (WDL): Within defined limits    Functional Measure:  Alvarez/56, low fall risk       Physical Therapy Evaluation Charge Determination   History Examination Presentation Decision-Making   LOW Complexity : Zero comorbidities / personal factors that will impact the outcome / POC LOW Complexity : 1-2 Standardized tests and measures addressing body structure, function, activity limitation and / or participation in recreation  LOW Complexity : Stable, uncomplicated  Other outcome measures Alvarez  LOW       Based on the above components, the patient evaluation is determined to be of the following complexity level: LOW     Pain Rating:  None reported    Activity Tolerance:   Good, desaturates with exertion and requires oxygen, and observed SOB with activity      After treatment patient left in no apparent distress:   Supine in bed, Call bell within reach, Caregiver / family present, and Side rails x 3    COMMUNICATION/EDUCATION:   The patients plan of care was discussed with: Physical therapist, Registered nurse, and Case management. Fall prevention education was provided and the patient/caregiver indicated understanding., Patient/family have participated as able in goal setting and plan of care. , and Patient/family agree to work toward stated goals and plan of care.     Thank you for this referral.  Sukh De Jesus, PT   Time Calculation: 32 mins

## 2023-02-21 NOTE — PROGRESS NOTES
Six Minute Walk Test     Data Measured Before the Walk  Heart Rate: 72  O2 Saturation: 85%  O2 Device: RA  O2 Flow Rate: 1L added  Jace Dyspnea Rate: 1  Jace Fatigue Scale: 0    Data Measured During the Walk  Heart Rate: 79  O2 Flow Rate: 1L  O2 Saturation: 89%  O2 Flow Rate: 1L  O2 Saturation: 85%  HR: 87  O2 Flow Rate: 2L  O2 Saturation: 89%  HR: 81  O2 Flow Rate: 2L  O2 Saturation: 88%  HR: 89  O2 Flow Rate: 3L  O2 Saturation: 88%  HR; 80  O2 Flow Rate: 3L  O2 Saturation: 88%  HR:81  Symptoms:  some SOB but patient stated he was not tired. Any Problems While Exercising:No  Jace Dyspnea Rate: 3  Jace Fatigue Scale: 2    Data Measured Immediately After Walk  Did Patient Stop or Pause Before 6 Minutes: No  Why Patient Stopped or Paused: NO  Predicted Distance: 576 Meters  Total Distance Walked: 0693 ft  Heart Rate: 80  O2 Flow Rate: 3L  O2 Saturation: 93%  Jace Dyspnea Rate: 2  Jace Fatigue Scale: 1    Data Measured 5 Minutes After Walk  Heart Rate: 72  O2 Saturation: 90% on 2L NC  Comments: patient tolerated well; left on 2L NC.         Electronically signed by RT Terry on 2/21/2023 at 12:33 PM

## 2023-02-21 NOTE — PROGRESS NOTES
Problem: Falls - Risk of  Goal: *Absence of Falls  Description: Document Katie Del Rosario Fall Risk and appropriate interventions in the flowsheet.   Outcome: Progressing Towards Goal  Note: Fall Risk Interventions:     Problem: Patient Education: Go to Patient Education Activity  Goal: Patient/Family Education  Outcome: Progressing Towards Goal     Problem: Patient Education: Go to Patient Education Activity  Goal: Patient/Family Education  Outcome: Progressing Towards Goal     Problem: Pneumonia: Day 1  Goal: Off Pathway (Use only if patient is Off Pathway)  Outcome: Progressing Towards Goal  Goal: Activity/Safety  Outcome: Progressing Towards Goal  Goal: Consults, if ordered  Outcome: Progressing Towards Goal  Goal: Diagnostic Test/Procedures  Outcome: Progressing Towards Goal  Goal: Nutrition/Diet  Outcome: Progressing Towards Goal  Goal: Medications  Outcome: Progressing Towards Goal  Goal: Respiratory  Outcome: Progressing Towards Goal  Goal: Treatments/Interventions/Procedures  Outcome: Progressing Towards Goal  Goal: Psychosocial  Outcome: Progressing Towards Goal  Goal: *Oxygen saturation within defined limits  Outcome: Progressing Towards Goal  Goal: *Influenza vaccine administered (October-March)  Outcome: Progressing Towards Goal  Goal: *Pneumoccocal vaccine administered  Outcome: Progressing Towards Goal  Goal: *Hemodynamically stable  Outcome: Progressing Towards Goal  Goal: *Demonstrates progressive activity  Outcome: Progressing Towards Goal  Goal: *Tolerating diet  Outcome: Progressing Towards Goal

## 2023-02-21 NOTE — ED NOTES
Verbal shift change report given to [facility] (oncoming nurse) by Paul Pool (offgoing nurse). Report included the following information SBAR, ED Summary, MAR and Recent Results.

## 2023-02-21 NOTE — ED NOTES
TRANSFER - OUT REPORT:    Verbal report given to Maddie Nur RN(name) on Su Pearce  being transferred to Telemetry(unit) for routine progression of care       Report consisted of patients Situation, Background, Assessment and   Recommendations(SBAR). Information from the following report(s) SBAR, ED Summary, [facility] and Recent Results was reviewed with the receiving nurse. Lines:   Peripheral IV 02/20/23 Left Antecubital (Active)   Site Assessment Clean, dry, & intact 02/20/23 1649   Phlebitis Assessment 0 02/20/23 1649   Infiltration Assessment 0 02/20/23 1649   Dressing Status Clean, dry, & intact 02/20/23 1649   Dressing Type Transparent 02/20/23 1649   Hub Color/Line Status Pink;Patent; Flushed 02/20/23 1649   Action Taken Blood drawn 02/20/23 1649        Opportunity for questions and clarification was provided.       Patient transported with:   Monitor  O2 @ 2 liters

## 2023-02-21 NOTE — PROGRESS NOTES
Pharmacist Discharge Medication Reconciliation    Discharge Provider:  Dr Cinthia Posada        Discharge Medications:      My Medications        START taking these medications        Instructions Each Dose to Equal Morning Noon Evening Bedtime   amoxicillin-clavulanate 875-125 mg per tablet  Commonly known as: Augmentin    Your last dose was: Your next dose is: Take 1 Tablet by mouth two (2) times a day for 7 days. 1 Tablet                 azithromycin 250 mg tablet  Commonly known as: Zithromax Z-Rufino    Your last dose was: Your next dose is:         Please take one tab daily                  dextromethorphan hbr 15 mg/5 mL syrup  Commonly known as: ROBITUSSIN MAXIMUM STRENGTH    Your last dose was: Your next dose is: Take 10 mL by mouth four (4) times daily as needed for Cough for up to 10 days. 10 mL                 guaiFENesin  mg ER tablet  Commonly known as: [address]    Your last dose was: Your next dose is: Take 1 Tablet by mouth every twelve (12) hours for 10 days. 600 mg                 predniSONE 20 mg tablet  Commonly known as: Aristides Cruz  Start taking on: February 22, 2023    Your last dose was: Your next dose is: Take 2 Tablets by mouth daily (with breakfast) for 4 doses. 40 mg                 tiotropium bromide 2.5 mcg/actuation inhaler  Commonly known as: Spiriva Respimat    Your last dose was: Your next dose is: Take 2 Puffs by inhalation daily. 2 Puff                        CONTINUE taking these medications        Instructions Each Dose to Equal Morning Noon Evening Bedtime   albuterol 90 mcg/actuation inhaler  Commonly known as: PROVENTIL HFA, VENTOLIN HFA, PROAIR HFA    Your last dose was: Your next dose is: Take 1 Puff by inhalation daily as needed for Wheezing. 1 Puff                 amLODIPine 10 mg tablet  Commonly known as: NORVASC    Your last dose was:      Your next dose is:         TAKE 1 TABLET BY MOUTH EVERY DAY FOR BLOOD PRESSURE                  fluticasone propion-salmeteroL 250-50 mcg/dose diskus inhaler  Commonly known as: ADVAIR/WIXELA    Your last dose was: Your next dose is: Take 1 Puff by inhalation every twelve (12) hours.    1 Puff                           Where to Get Your Medications        These medications were sent to [address] [place] [address], [address] [place] [address]      Phone: [phone]   amoxicillin-clavulanate 875-125 mg per tablet  azithromycin 250 mg tablet  dextromethorphan hbr 15 mg/5 mL syrup  guaiFENesin  mg ER tablet  predniSONE 20 mg tablet  tiotropium bromide 2.5 mcg/actuation inhaler          The patient's chart, MAR, and AVS were reviewed by   Catrachito Lyman, Donna Perez,   Contact: [phone]

## 2023-02-21 NOTE — PROGRESS NOTES
[facility] Admission Pharmacy Medication Reconciliation    Information obtained from:[phone] and SANYA Márquez [phone]  Patient not a great historian for medication profile  RxQuery data available1:yes    Comments/recommendations:    Patient was taking tamsulosin in December  and provider still has him on this med, but patient says that he is not currently taking this med. The [place] Prescription Monitoring Program () was accessed to determine fill history of any controlled medications. yes gabapentin 100mg tid(last filled on 12/2/22)    Medication changes (since last review): Added  Advair 250-50 diskus  Removed  Trelegy Ellipta,gabapentin and tamsulosin(on providers list of meds.)  hydrochlorothizaide  Adjusted  Albuterol inhaler     1RxQuery pharmacy benefit data reflects medications filled and processed through the patient's insurance, however                this data does NOT capture whether the medication was picked up or is currently being taken by the patient. Patient allergies: Allergies as of 02/20/2023 - Fully Reviewed 02/20/2023   Allergen Reaction Noted    Chantix [varenicline] Other (comments) 05/21/2021         Prior to Admission Medications   Prescriptions Last Dose Informant Patient Reported? Taking? albuterol (PROVENTIL HFA, VENTOLIN HFA, PROAIR HFA) 90 mcg/actuation inhaler  Other Yes Yes   Sig: Take 1 Puff by inhalation daily as needed for Wheezing. amLODIPine (NORVASC) 10 mg tablet  Other No Yes   Sig: TAKE 1 TABLET BY MOUTH EVERY DAY FOR BLOOD PRESSURE   fluticasone propion-salmeteroL (ADVAIR/WIXELA) 250-50 mcg/dose diskus inhaler  Other Yes Yes   Sig: Take 1 Puff by inhalation every twelve (12) hours.       Facility-Administered Medications: None          Thank you,  NOAH Sagastume

## 2023-02-21 NOTE — PROGRESS NOTES
ADULT PROTOCOL: JET AEROSOL ASSESSMENT    Patient  Tracey Cunha     65 y.o.   male     2/21/2023  8:58 AM    Breath Sounds Pre Procedure: Right Breath Sounds: Diminished (slightly coarse)                               Left Breath Sounds: Diminished (slightly course)    Breath Sounds Post Procedure: Right Breath Sounds: Diminished                                 Left Breath Sounds: Diminished    Breathing pattern: Pre procedure Breathing Pattern: Regular          Post procedure Breathing Pattern: Regular    Heart Rate: Pre procedure Pulse: 57           Post procedure Pulse: 58    Resp Rate: Pre procedure Respirations: 18           Post procedure Respirations: 16    Cough: Pre procedure Cough: Non-productive               Post procedure Cough: Non-productive    Suctioned: NO    Sputum: Pre procedure                   Post procedure      Oxygen: O2 Device: Nasal cannula   Flow rate (L/min) 2     Changed: NO    SpO2: Pre procedure SpO2: 91 %   with oxygen              Post procedure SpO2: 98 %  with oxygen    Nebulizer Therapy: Current medications Aerosolized Medications: Brovana, Pulmicort      Changed: NO    Smoking History: smoker    Problem List:   Patient Active Problem List   Diagnosis Code    Hypertension I10    Lower urinary tract symptoms due to benign prostatic hyperplasia N40.1    Benign prostatic hyperplasia with lower urinary tract symptoms N40.1    CAP (community acquired pneumonia) J18.9    Respiratory failure with hypoxia (HCC) J96.91       Respiratory Therapist: Nory Regalado, RT

## 2023-02-21 NOTE — PROGRESS NOTES
[facility].       2/21/2023      RE: Arben Player      To Whom it May Concern: This is to certify that Arben Player was admitted to hospital on 2/20/2023   to  He may return to work on 2/27/2023. Thank you for your assistance in this matter.     Sincerely,      Sharmaine Melendrez MD

## 2023-02-21 NOTE — ED NOTES
Pt asking if he can be discharged  Dr Raoul Brush informed pt his O2 level is too low for him to comfortably discharge him. Dr Raoul Brush was reviewing [facility] paperwork and pt stated he will stay.

## 2023-02-21 NOTE — H&P
a                                   History & Physical    Primary Care Provider: Susy Blake NP  Source of Information: Patient and chart review    History of Presenting Illness:   Serafin Laurent is a 65 y.o. male with copd, htn, tobacco use, BPH who presents to hospital with complaints of dyspnea. He reports worsening dyspnea with very minimal exertion over the last week. Developed a cough over the last 3 days which initially was nonproductive. Today, he noted blood-tinged sputum which prompted him to seek emergency room care. States he is overall active and healthy but is a daily smoker. The patient denies any fever, chills, chest or abdominal pain, nausea, vomiting congestion, recent illness, palpitations, or dysuria. Remarkable vitals on ER Presentation: spo2 86% on RA  Labs Remarkable for: WBC 13,   ER Images: CT chest showed centrilobular emphysema and left lower lobe pneumonia. No PE  ER treatment-Rocephin and azithromycin, saline bolus     Review of Systems:  Pertinent items are noted in the History of Present Illness. Past Medical History:   Diagnosis Date    Hypertension       Past Surgical History:   Procedure Laterality Date    COLONOSCOPY N/A 10/27/2020    COLONOSCOPY performed by Jaye Mckenzie MD at [place]    HX HEENT      HX HERNIA REPAIR  8/2010    NONEMERG TRANSP MILEAGE       Prior to Admission medications    Medication Sig Start Date End Date Taking? Authorizing Provider   amoxicillin-clavulanate (Augmentin) 875-125 mg per tablet Take 1 Tablet by mouth two (2) times a day for 7 days. 2/20/23 2/27/23 Yes Mariella Manzo MD   azithromycin (Zithromax Z-Rufino) 250 mg tablet Please take one tab daily 2/20/23 2/25/23 Yes Mariella Manzo MD   hydroCHLOROthiazide (HYDRODIURIL) 25 mg tablet 1 tab(s) orally once a day for 90 days    Provider, Historical   fluticasone-umeclidin-vilanter (Trelegy Ellipta) 200-62.5-25 mcg inhaler Take 1 Puff by inhalation daily. 12/1/22   Surya Luna NP   tamsulosin (FLOMAX) 0.4 mg capsule Take 1 Capsule by mouth daily. 12/1/22   Surya Luna NP   gabapentin (NEURONTIN) 100 mg capsule Take 1 Capsule by mouth three (3) times daily. Max Daily Amount: 300 mg. 12/1/22   Quynh OLSON NP   ammonium lactate (Lac-Hydrin Five) 5 % lotion Apply  to affected area as needed (dry skin). 12/1/22   Surya Luna NP   albuterol (PROVENTIL HFA, VENTOLIN HFA, PROAIR HFA) 90 mcg/actuation inhaler Take 1 Puff by inhalation every six (6) hours as needed for Wheezing or Shortness of Breath. 9/29/22   Quynh OLSON NP   amLODIPine (NORVASC) 10 mg tablet TAKE 1 TABLET BY MOUTH EVERY DAY FOR BLOOD PRESSURE 9/29/22   Surya Luna NP     Allergies   Allergen Reactions    Chantix [Varenicline] Other (comments)     nightmares      History reviewed. No pertinent family history. SOCIAL HISTORY:  Patient resides:  Independently x   Assisted Living    SNF    With family care       Smoking history:   None x   Former    Chronic      Alcohol history:   None x   Social    Chronic      Ambulates:   Independently x   w/cane    w/walker    w/wc    CODE STATUS:  DNR    Full x   Other      Objective:     Physical Exam:     Visit Vitals  /76   Pulse 63   Temp 98.5 °F (36.9 °C)   Resp 12   Ht 5' 9\" (1.753 m)   Wt 80 kg (176 lb 5.9 oz)   SpO2 94%   BMI 26.05 kg/m²      O2 Device: None (Room air)    General:  Alert, cooperative, no distress, appears stated age. Head:  Normocephalic, without obvious abnormality, atraumatic. Eyes:  Conjunctivae/corneas clear. PERRL, EOMs intact. Nose: Nares normal. Septum midline. Mucosa normal.        Neck: Supple, symmetrical, trachea midline, no carotid bruit and no JVD. Lungs:   Coarse breath sounds bilaterally. Mild wheezing. Chest wall:  No tenderness or deformity. Heart:  Regular rate and rhythm, S1, S2 normal   Abdomen:     Bowel sounds normal. No masses,  No organomegaly. Extremities: Extremities normal, atraumatic, no cyanosis or edema. Pulses: 2+ and symmetric all extremities. Skin: Skin color, texture, turgor normal. No rashes or lesions   Neurologic: CNII-XII grossly intact. EKG/Tele:  nsr  Data Review:     Recent Days:  Recent Labs     02/20/23  1630   WBC 13.0*   HGB 13.3   HCT 43.5        Recent Labs     02/20/23  1630      K 3.4*      CO2 32   *   BUN 13   CREA 1.34*   CA 8.8   ALB 3.3*   ALT 38   INR 1.1     No results for input(s): PH, PCO2, PO2, HCO3, FIO2 in the last 72 hours. 24 Hour Results:  Recent Results (from the past 24 hour(s))   CBC WITH AUTOMATED DIFF    Collection Time: 02/20/23  4:30 PM   Result Value Ref Range    WBC 13.0 (H) 4.1 - 11.1 K/uL    RBC 4.86 4.10 - 5.70 M/uL    HGB 13.3 12.1 - 17.0 g/dL    HCT 43.5 36.6 - 50.3 %    MCV 89.5 80.0 - 99.0 FL    MCH 27.4 26.0 - 34.0 PG    MCHC 30.6 30.0 - 36.5 g/dL    RDW 13.8 11.5 - 14.5 %    PLATELET 408 179 - 805 K/uL    MPV 9.9 8.9 - 12.9 FL    NRBC 0.0 0  WBC    ABSOLUTE NRBC 0.00 0.00 - 0.01 K/uL    NEUTROPHILS 82 (H) 32 - 75 %    LYMPHOCYTES 10 (L) 12 - 49 %    MONOCYTES 7 5 - 13 %    EOSINOPHILS 1 0 - 7 %    BASOPHILS 0 0 - 1 %    IMMATURE GRANULOCYTES 0 0.0 - 0.5 %    ABS. NEUTROPHILS 10.5 (H) 1.8 - 8.0 K/UL    ABS. LYMPHOCYTES 1.3 0.8 - 3.5 K/UL    ABS. MONOCYTES 0.9 0.0 - 1.0 K/UL    ABS. EOSINOPHILS 0.2 0.0 - 0.4 K/UL    ABS. BASOPHILS 0.0 0.0 - 0.1 K/UL    ABS. IMM.  GRANS. 0.1 (H) 0.00 - 0.04 K/UL    DF AUTOMATED     METABOLIC PANEL, COMPREHENSIVE    Collection Time: 02/20/23  4:30 PM   Result Value Ref Range    Sodium 142 136 - 145 mmol/L    Potassium 3.4 (L) 3.5 - 5.1 mmol/L    Chloride 103 97 - 108 mmol/L    CO2 32 21 - 32 mmol/L    Anion gap 7 5 - 15 mmol/L    Glucose 133 (H) 65 - 100 mg/dL    BUN 13 6 - 20 MG/DL    Creatinine 1.34 (H) 0.70 - 1.30 MG/DL    BUN/Creatinine ratio 10 (L) 12 - 20      eGFR >60 >60 ml/min/1.73m2    Calcium 8.8 8.5 - 10.1 MG/DL    Bilirubin, total 0.6 0.2 - 1.0 MG/DL    ALT (SGPT) 38 12 - 78 U/L    AST (SGOT) 32 15 - 37 U/L    Alk. phosphatase 95 45 - 117 U/L    Protein, total 6.9 6.4 - 8.2 g/dL    Albumin 3.3 (L) 3.5 - 5.0 g/dL    Globulin 3.6 2.0 - 4.0 g/dL    A-G Ratio 0.9 (L) 1.1 - 2.2     PROTHROMBIN TIME + INR    Collection Time: 02/20/23  4:30 PM   Result Value Ref Range    INR 1.1 0.9 - 1.1      Prothrombin time 11.0 9.0 - 11.1 sec   TROPONIN-HIGH SENSITIVITY    Collection Time: 02/20/23  4:30 PM   Result Value Ref Range    Troponin-High Sensitivity 10 0 - 76 ng/L   NT-PRO BNP    Collection Time: 02/20/23  4:30 PM   Result Value Ref Range    NT pro- (H) 0 - 125 PG/ML   PTT    Collection Time: 02/20/23  4:30 PM   Result Value Ref Range    aPTT 22.3 22.1 - 31.0 sec    aPTT, therapeutic range     58.0 - 77.0 SECS         Imaging:     Assessment:     Keith Kerr is a 65 y.o. male with copd, htn, tobacco use, BPH who is admitted for acute hypoxic respiratory failure secondary to CAP       Plan:       Acute hypoxic respiratory failure 2/2 CAP  COPD exacerbation  -Continue with cefepime and Doxy  -Sputum culture  -DuoNebs every 6, Brovana plus Pulmicort  -Solu-Medrol 125  -May need 6-minute walk test prior to discharge  -Encourage ambulation    Hypertension  -Continue home amlodipine and HCTZ    Hypokalemia  -Mild. Monitor and replete. Check mag.     BPH  -Home Flomax    Tobacco use  -Counseled on cessation  -Nicotine patch as needed          FEN/GI -  [email or web address]  Activity - as tolerated  DVT prophylaxis - lovenox  GI prophylaxis -  NI  Disposition - home    CODE STATUS:   full code       Signed By: Kiesha Chairez MD     February 20, 2023

## 2023-02-21 NOTE — PROGRESS NOTES
2/21/2023 -   TRANSITIONS OF CARE PLAN:   RUR: 7%; LOW   DISPOSITION: Own Home  TRANSPORT: Spouse   DME: Delivery of O2 via [address] is pending  NEEDS FOR DISCHARGE: Home O2  BARRIERS: Patient identified that he does not want to wear O2 24/7  ANTICIPATED FOLLOW UPS: PCP on 3/2  ONGOING INPATIENT NEEDS: ABX, Cultures Pending, DuoNebs, Steroids, Increased Ambulation, IVF, Possible 6 MWT    Anticipated Discharge is: Less Than 24 Hours    Reason for Admission:  Respiratory Failure with Hypoxia                   RUR Score:  7%; LOW                   Plan for utilizing home health:     None     PCP: First and Last name:  Jessica Cordero NP     Name of Practice: [facility]   Are you a current patient: Yes/No: Yes   Approximate date of last visit: 12/1/22   Can you participate in a virtual visit with your PCP:                     Current Advanced Directive/Advance Care Plan: Full Code    Healthcare Decision Maker:   Click here to complete [address] including [address] [facility] Relationship (ie \"Primary\")             Primary Decision MakeTyshawn Gardner - Spouse - [phone]                  Transition of Care Plan:       1000 -   CM participated in IDRs on patient. Patient is anticipated to be cleared for discharge today, 2/21, pending 6MWT.     200 -   CM was notified by Attending that patient will require Home O2 setup.    1300-    CM met with patient, with patient alert and oriented x4    At baseline, patient lives with spouse in a single story home, 1 exterior step    At baseline, patient is independent in ADLs, to include driving and working as a construction material delivery [address] DME includes: Nebulizer, partial dentures     Patient has no hx of HH or [address] preference is: [email or web address]     Additional support includes: spouse, adult aged son does not live locally; additional family is local     Patient has an AMD at home that indicates spouse, Juan C Hess ([phone]) as mPOA      Patient has been fully vaccinated via the [address] vaccine, with having received 3 doses     CM discussed Home O2 needs, and patient expressed need to be able continuing to work. CM discussed the above with Attending, who is in agreement to proceed with Home O2 being set up; if patient opts to not wear O2 continuously then that would be the patient's choice. Referral submitted to [address] [place] [address] is on AVS.    CM set patient's PCP follow up appt for 3/2 at 11AM; info is on AVS    Likely disposition is for discharge to own home with transport via family, pending medical progression and clearance by patient's medical team.     CM Team to continue following for any additional CHRISTA needs. Care Management Interventions  PCP Verified by CM: Yes  Palliative Care Criteria Met (RRAT>21 & CHF Dx)?: No  Mode of Transport at Discharge:  Other (see comment) (spouse)  Transition of Care Consult (CM Consult): Discharge Planning, DME/Supply Assistance  MyChart Signup: No  Discharge Durable Medical Equipment: No (Home O2)  Health Maintenance Reviewed: Yes  Physical Therapy Consult: No  Occupational Therapy Consult: No  Speech Therapy Consult: No  Support Systems: Spouse/Significant Other, Other Family Member(s), Child(jaylin)  Confirm Follow Up Transport: Self  The Plan for Transition of Care is Related to the Following Treatment Goals : Home with Home O2 via [address] [place] [address].  The Patient and/or Patient Representative was Provided with a Choice of Provider and Agrees with the Discharge Plan?: Yes  Name of the Patient Representative Who was Provided with a Choice of Provider and Agrees with the Discharge Plan: patient, self  Freedom of Choice List was Provided with Basic Dialogue that Supports the Patient's Individualized Plan of Care/Goals, Treatment Preferences and Shares the Quality Data Associated with the Providers?: Yes  [employer] Information Provided?: No  Discharge Location  Patient Expects to be Discharged to[de-identified] Home with family assistance    CRM: Dee Dee Ashton, MPH, Jess [address].: [phone]

## 2023-02-21 NOTE — PROGRESS NOTES
CHRISTA     RUR  6 %     IDR Rounds this am with MD and team   MERLIN later today. Need for home oxygen     See CM note for details  Choice of agency- referral sent to [facility]  Via [address] [place] [address],[facility] Floor-  discussed with Artem Woods [phone]   to work on authorization and let me know when delivery.     Jami Smith MSW RN   [phone]       Addendum :  4PM   Call from nursing patient had concerns about being discharged on oxygen-   When decision for oxygen was decided for home it was late Morning-  Waiting for completion of the Criteria to be met,       Addemdum  4:47pM     Patient to accept oxygen- [facility] here to deliver Portable Tank to the patient   And to follow-up with home concentrator   Also look into seeing if he qualifies for Battery run tank     [employer]  MSW RN

## 2023-02-22 ENCOUNTER — TELEPHONE (OUTPATIENT)
Dept: CASE MANAGEMENT | Age: 63
End: 2023-02-22

## 2023-02-22 NOTE — TELEPHONE ENCOUNTER
Hospital follow up  Case Management follow up call    CM called patient by telephone to perform post discharge assessment and for the purpose of follow up call from inpatient discharge to check on environmental challenges/medications/appointment follow up/and questions/concerns. The call was answered by patient/family/caretaker/agency, introduction self, and explanation and reason for call, name and  confirmed    Spoke to patient wife Milo Yeh. She confirms patient is doing well. Patient was able to  his medications already. He came home with the portable tank from the hospital. CM asked if home concentrator was delivered. Patient mentioned \"no one called them about it. \"     CM called Rigo Velásquez from the agency to confirm. He states, the patient was going to discuss with his wife and call him but never did. Rigo Velásquez asked for the wife to call him and he will coordinate the delivery of the concentrator. CM called wife back to let her know of [employer] number. She will call him and get the delivery organized. Is aware of patient pcp follow up on 3/2. On AVS prior to d/c. No further questions or concerns. Encounter closed.      JOHNATHAN SoteloN, RN, CM Pt here with dad.    Child states she awoke with left jaw pain today.  No injury, denies dental pain.  Ibuprofen 400mg taken 1/2hr PTA.

## 2023-02-23 NOTE — PROGRESS NOTES
Physician Progress Note      Oralia Cortez  CSN #:                  [id number]  :                       1960  ADMIT DATE:       2023 4:17 PM  [address] DATE:        2023 5:24 PM  RESPONDING  PROVIDER #:        Augustus Garcia MD          QUERY TEXT:    Pt admitted with PNA. Pt noted to be treated with cefepime, ceftriaxone, and doxycycline, then discharged on augmentin and azithromycin. If possible, please document in the progress notes and discharge summary if you are evaluating and/or treating any of the following:    Note: CAP and HCAP indicate where the pneumonia was acquired, not a specific type. The medical record reflects the following:  Risk Factors: 63yo with PNA    Clinical Indicators:  DCS  #Acute hypoxic respiratory failure due to advanced emphysema stable on supplemental oxygen  #Community-acquired pneumonia improved  #COPD exacerbation improved  #Productive cough with blood-tinged sputum improved due to pneumonia/COPD  -CTA reviewed independently no pulmonary embolism left lower lobe pneumonia Central lobar emphysema. -COVID-19 influenza negative    Treatment:  in house: cefepime, ceftriaxone, and doxycycline; discharged: augmentin and azithromycin    Thank you,  Nataly Robertson  [phone]  I am also available via [employer].   Options provided:  -- Gram negative pneumonia  -- Other - I will add my own diagnosis  -- Disagree - Not applicable / Not valid  -- Disagree - Clinically unable to determine / Unknown  -- Refer to Clinical Documentation Reviewer    PROVIDER RESPONSE TEXT:    Bacterial pneumonia    Query created by: Mauro Ko on 2023 10:33 AM      Electronically signed by:  Augustus Garcia MD 2023 10:37 AM

## 2023-03-02 ENCOUNTER — OFFICE VISIT (OUTPATIENT)
Dept: INTERNAL MEDICINE CLINIC | Age: 63
End: 2023-03-02

## 2023-03-02 VITALS
RESPIRATION RATE: 18 BRPM | SYSTOLIC BLOOD PRESSURE: 135 MMHG | WEIGHT: 173 LBS | OXYGEN SATURATION: 88 % | HEIGHT: 69 IN | BODY MASS INDEX: 25.62 KG/M2 | HEART RATE: 82 BPM | TEMPERATURE: 97.8 F | DIASTOLIC BLOOD PRESSURE: 93 MMHG

## 2023-03-02 DIAGNOSIS — Z76.89 ENCOUNTER FOR SUPPORT AND COORDINATION OF TRANSITION OF CARE: Primary | ICD-10-CM

## 2023-03-02 DIAGNOSIS — J44.9 CHRONIC OBSTRUCTIVE PULMONARY DISEASE, UNSPECIFIED COPD TYPE (HCC): ICD-10-CM

## 2023-03-02 DIAGNOSIS — Z09 HOSPITAL DISCHARGE FOLLOW-UP: ICD-10-CM

## 2023-03-02 DIAGNOSIS — I10 ESSENTIAL HYPERTENSION: ICD-10-CM

## 2023-03-02 DIAGNOSIS — R73.09 ELEVATED GLUCOSE: ICD-10-CM

## 2023-03-02 DIAGNOSIS — J18.9 COMMUNITY ACQUIRED PNEUMONIA OF LEFT LOWER LOBE OF LUNG: ICD-10-CM

## 2023-03-02 DIAGNOSIS — F17.218 CIGARETTE NICOTINE DEPENDENCE WITH OTHER NICOTINE-INDUCED DISORDER: ICD-10-CM

## 2023-03-02 DIAGNOSIS — Z99.81 ON HOME OXYGEN THERAPY: ICD-10-CM

## 2023-03-02 LAB — HBA1C MFR BLD HPLC: 5.9 %

## 2023-03-02 RX ORDER — VARENICLINE TARTRATE 25 MG
0.5 KIT ORAL
Qty: 1 DOSE PACK | Refills: 0 | Status: SHIPPED | OUTPATIENT
Start: 2023-03-02

## 2023-03-02 RX ORDER — FLUTICASONE PROPIONATE AND SALMETEROL 250; 50 UG/1; UG/1
1 POWDER RESPIRATORY (INHALATION) EVERY 12 HOURS
Qty: 60 EACH | Refills: 1 | Status: SHIPPED | OUTPATIENT
Start: 2023-03-02

## 2023-03-02 RX ORDER — ALBUTEROL SULFATE 90 UG/1
1 AEROSOL, METERED RESPIRATORY (INHALATION)
Qty: 18 G | Refills: 1 | Status: SHIPPED | OUTPATIENT
Start: 2023-03-02

## 2023-03-02 NOTE — PROGRESS NOTES
Chief Complaint   Patient presents with    Hospital Follow Up     [facility] - pt states he is feeling better still has slight runny nose and diarrhea    Request For New Medication     Chantix - pt states although he had nightmares he would like to try again, if dreams are bad he will stop        1. \"Have you been to the ER, urgent care clinic since your last visit? Hospitalized since your last visit? \" Yes When: 2/20/23-2/21/23 [facility] Reason for visit: CAP    2. \"Have you seen or consulted any other health care providers outside of the [address] [place] [address] since your last visit? \" No     3. For patients aged 39-70: Has the patient had a colonoscopy / FIT/ Cologuard? Yes - no Care Gap present      If the patient is female:    4. For patients aged 41-77: Has the patient had a mammogram within the past 2 years? NA - based on age or sex      11. For patients aged 21-65: Has the patient had a pap smear?  NA - based on age or sex

## 2023-03-02 NOTE — PROGRESS NOTES
Subjective: (As above and below)     Chief Complaint   Patient presents with    Hospital Follow Up     [address]- pneumonia - pt states he is feeling better still has slight runny nose and diarrhea    Request For New Medication     Chantix - pt states although he had nightmares he would like to try again, if dreams are bad he will stop      Kelly Gay is a 65y.o. year old male who presents for transition of care  He was admitted from 2/20/23 to 2/21/23 for CAP- LLL  No PE  He is home, sent w/ oxygen tank at 3L, he did not bring today  He does not see pulm  He feels mostly better, some residual runny nose, PENA, and diarrhea    BNP high, ECHO previously ordered but not done    Hypertension ROS:  taking medications as instructed, no medication side effects noted, no TIAs, no chest pain on exertion, no dyspnea on exertion, no swelling of ankles    Smoker; unfortunately continues, he wants to try chantix again- gave vivid dreams but no other a/e          Reviewed PmHx, RxHx, FmHx, SocHx, AllgHx and updated in chart. History reviewed. No pertinent family history. Past Medical History:   Diagnosis Date    Hypertension       Social History     Socioeconomic History    Marital status:     Number of children: 1   Occupational History    Occupation: [employer]     Employer: [employer]     Comment: s. Tobacco Use    Smoking status: Every Day     Packs/day: 1.00     Types: Cigarettes    Smokeless tobacco: Never   Vaping Use    Vaping Use: Never used   Substance and Sexual Activity    Alcohol use: No    Drug use: No    Sexual activity: Not Currently     Partners: Female   Social History Narrative    Lives with girlfriend, has one son, age 15, lives with mother. Works at [employer] for 3 years          Current Outpatient Medications   Medication Sig    albuterol (PROVENTIL HFA, VENTOLIN HFA, PROAIR HFA) 90 mcg/actuation inhaler Take 1 Puff by inhalation daily as needed for Wheezing.     fluticasone propion-salmeteroL (ADVAIR/WIXELA) 250-50 mcg/dose diskus inhaler Take 1 Puff by inhalation every twelve (12) hours. tiotropium bromide (Spiriva Respimat) 2.5 mcg/actuation inhaler Take 2 Puffs by inhalation daily. dextromethorphan hbr (ROBITUSSIN MAXIMUM STRENGTH) 15 mg/5 mL syrup Take 10 mL by mouth four (4) times daily as needed for Cough for up to 10 days. guaiFENesin ER (MUCINEX) 600 mg ER tablet Take 1 Tablet by mouth every twelve (12) hours for 10 days. amLODIPine (NORVASC) 10 mg tablet TAKE 1 TABLET BY MOUTH EVERY DAY FOR BLOOD PRESSURE     No current facility-administered medications for this visit. Review of Systems:   Constitutional:    Negative for fever and chills, negative diaphoresis. HEENT:              Negative for neck pain and stiffness. Eyes:                  Negative for visual disturbance, itching, redness or discharge. Respiratory:        Negative for cough and shortness of breath. Cardiovascular:  Negative for chest pain and palpitations. Gastrointestinal: Negative for nausea, vomiting, abdominal pain, diarrhea or constipation. Genitourinary:     Negative for dysuria and frequency. Musculoskeletal: Negative for falls, tenderness and swelling. Skin:                    Negative for rash, masses or lesions. Neurological:       Negative for dizzyness, seizure, loss of consciousness, weakness and numbness. Objective:     Vitals:    03/02/23 1119   BP: (!) 135/93   Pulse: 82   Resp: 18   Temp: 97.8 °F (36.6 °C)   TempSrc: Temporal   SpO2: (!) 88%   Weight: 173 lb (78.5 kg)   Height: 5' 9\" (1.753 m)       Results for orders placed or performed in visit on 03/02/23   AMB POC HEMOGLOBIN A1C   Result Value Ref Range    Hemoglobin A1c (POC) 5.9 %     Sats dropped to 82% while walking test down the [address] on [place]    Gen: Oriented to person, place and time and well-developed, well-nourished and in no distress. HEENT:    Head: normocephalic and atraumatic.     Eyes:  EOM are normal. Pupils equal and round. Neck:  Normal range of motion. Neck supple. Cardiovascular: normal rate, regular rhythm and normal heart sounds. Pulmonary/Chest:  Effort normal and breath sounds normal.  No respiratory distress. No wheezes, no rales. Abdominal: soft, normal  bowel sounds. Musculoskeletal:  No edema, no tenderness. No calf tenderness or edema. Neurological:  Alert, oriented to person, place and time. Skin: skin is warm and dry. Assessment/ Plan:       1. Encounter for support and coordination of transition of care      2. Elevated glucose    - AMB POC HEMOGLOBIN A1C    3. Cigarette nicotine dependence with other nicotine-induced disorder  He will stop if a/e  - varenicline (Chantix Starting Month Box) 0.5 mg (11)- 1 mg (42) DsPk; Take 0.5 mg by mouth daily (after breakfast). Dispense: 1 Dose Pack; Refill: 0    4. Essential hypertension      5. Community acquired pneumonia of left lower lobe of lung      6. Chronic obstructive pulmonary disease, unspecified COPD type ([address]    - REFERRAL TO PULMONARY DISEASE  - AMB SUPPLY ORDER    7. On home oxygen therapy  Will try to get portable tank, discussed tank safety, reduce from 3L to 2L, would benefit from home pulse ox to wean off O2  - AMB SUPPLY ORDER  - AMB SUPPLY ORDER        I have discussed the diagnosis with the patient and the intended plan as seen in the above orders. The patient has received an after-visit summary and questions were answered concerning future plans. Pt conveyed understanding of plan. Medication Side Effects and Warnings were discussed with patient: yes  Patient Labs were reviewed: yes  Patient Past Records were reviewed:  yes    Teodoro Spatz.  Leander Merlin, NP

## 2023-03-28 ENCOUNTER — HOSPITAL ENCOUNTER (OUTPATIENT)
Dept: NON INVASIVE DIAGNOSTICS | Age: 63
Discharge: HOME | End: 2023-03-28
Attending: NURSE PRACTITIONER
Payer: COMMERCIAL

## 2023-03-28 DIAGNOSIS — R06.02 SHORTNESS OF BREATH: ICD-10-CM

## 2023-03-28 PROCEDURE — 93306 TTE W/DOPPLER COMPLETE: CPT

## 2023-03-29 LAB
ECHO AO ROOT DIAM: 2.4 CM
ECHO AV AREA PLAN: 2.3 CM2
ECHO EST RA PRESSURE: 5 MMHG
ECHO LA DIAMETER: 3.9 CM
ECHO LA TO AORTIC ROOT RATIO: 1.63
ECHO LA VOL 4C: 36 ML (ref 18–58)
ECHO LV EDV A4C: 130 ML
ECHO LV EJECTION FRACTION A4C: 75 %
ECHO LV ESV A4C: 33 ML
ECHO LV FRACTIONAL SHORTENING: 52 % (ref 28–44)
ECHO LV INTERNAL DIMENSION DIASTOLIC: 5 CM (ref 4.2–5.9)
ECHO LV INTERNAL DIMENSION SYSTOLIC: 2.4 CM
ECHO LV IVSD: 0.9 CM (ref 0.6–1)
ECHO LV MASS 2D: 146.8 G (ref 88–224)
ECHO LV POSTERIOR WALL DIASTOLIC: 0.8 CM (ref 0.6–1)
ECHO LV RELATIVE WALL THICKNESS RATIO: 0.32
ECHO LVOT AREA: 2.8 CM2
ECHO LVOT DIAM: 1.9 CM
ECHO LVOT PEAK GRADIENT: 5 MMHG
ECHO LVOT PEAK VELOCITY: 1.1 M/S
ECHO MV A VELOCITY: 0.54 M/S
ECHO MV AREA PHT: 3.8 CM2
ECHO MV AREA PLAN: 5.6 CM2
ECHO MV E DECELERATION TIME (DT): 200.2 MS
ECHO MV E VELOCITY: 0.34 M/S
ECHO MV E/A RATIO: 0.63
ECHO MV MAX VELOCITY: 0.7 M/S
ECHO MV MEAN GRADIENT: 1 MMHG
ECHO MV MEAN VELOCITY: 0.5 M/S
ECHO MV PEAK GRADIENT: 2 MMHG
ECHO MV PRESSURE HALF TIME (PHT): 58.1 MS
ECHO MV VTI: 22.5 CM
ECHO PV MAX VELOCITY: 1 M/S
ECHO PV PEAK GRADIENT: 4 MMHG
ECHO RIGHT VENTRICULAR SYSTOLIC PRESSURE (RVSP): 36 MMHG
ECHO TV REGURGITANT MAX VELOCITY: 2.78 M/S
ECHO TV REGURGITANT PEAK GRADIENT: 33 MMHG

## 2023-03-29 PROCEDURE — 93306 TTE W/DOPPLER COMPLETE: CPT | Performed by: SPECIALIST

## 2023-04-06 ENCOUNTER — OFFICE VISIT (OUTPATIENT)
Dept: INTERNAL MEDICINE CLINIC | Age: 63
End: 2023-04-06
Payer: COMMERCIAL

## 2023-04-06 PROCEDURE — 99213 OFFICE O/P EST LOW 20 MIN: CPT | Performed by: NURSE PRACTITIONER

## 2023-04-06 NOTE — PROGRESS NOTES
Subjective: (As above and below)     Chief Complaint   Patient presents with    Follow-up     4 month     Results     Labs, Hollie Romano is a 65y.o. year old male who presents for     Hypertension ROS:  taking medications as instructed, no medication side effects noted, no TIAs, no chest pain on exertion, no dyspnea on exertion, no swelling of ankles    BP Readings from Last 3 Encounters:   04/06/23 (!) 141/88   03/02/23 (!) 135/93   02/21/23 131/68      He is quite stressed today because of a bill he rcd' from recent hospitalization for CAP    COPD; at last visit his walking sats were less than 90, he was rx'd oxygen which he feels that he no longer needs  He feels significantly better than last visit  He continues to smoke, has chantix and is working on starting this- some fear over nightmares. Reviewed PmHx, RxHx, FmHx, SocHx, AllgHx and updated in chart. History reviewed. No pertinent family history. Past Medical History:   Diagnosis Date    Hypertension       Social History     Socioeconomic History    Marital status:     Number of children: 1   Occupational History    Occupation: [employer]     Employer: [employer]     Comment: s. Tobacco Use    Smoking status: Every Day     Packs/day: 1.00     Types: Cigarettes     Passive exposure: Current    Smokeless tobacco: Never   Vaping Use    Vaping Use: Never used   Substance and Sexual Activity    Alcohol use: No    Drug use: No    Sexual activity: Not Currently     Partners: Female   Social History Narrative    Lives with girlfriend, has one son, age 15, lives with mother. Works at [employer] for 3 years          Current Outpatient Medications   Medication Sig    albuterol (PROVENTIL HFA, VENTOLIN HFA, PROAIR HFA) 90 mcg/actuation inhaler Take 1 Puff by inhalation daily as needed for Wheezing. fluticasone propion-salmeteroL (ADVAIR/WIXELA) 250-50 mcg/dose diskus inhaler Take 1 Puff by inhalation every twelve (12) hours. tiotropium bromide (Spiriva Respimat) 2.5 mcg/actuation inhaler Take 2 Puffs by inhalation daily. varenicline (Chantix Starting Month Box) 0.5 mg (11)- 1 mg (42) DsPk Take 0.5 mg by mouth daily (after breakfast). amLODIPine (NORVASC) 10 mg tablet TAKE 1 TABLET BY MOUTH EVERY DAY FOR BLOOD PRESSURE     No current facility-administered medications for this visit. Review of Systems:   Constitutional:    Negative for fever and chills, negative diaphoresis. HEENT:              Negative for neck pain and stiffness. Eyes:                  Negative for visual disturbance, itching, redness or discharge. Respiratory:        Negative for cough and shortness of breath. Cardiovascular:  Negative for chest pain and palpitations. Gastrointestinal: Negative for nausea, vomiting, abdominal pain, diarrhea or constipation. Genitourinary:     Negative for dysuria and frequency. Musculoskeletal: Negative for falls, tenderness and swelling. Skin:                    Negative for rash, masses or lesions. Neurological:       Negative for dizzyness, seizure, loss of consciousness, weakness and numbness.      Objective:     Vitals:    04/06/23 1606   BP: (!) 141/88   Pulse: 77   Resp: 18   Temp: 98.2 °F (36.8 °C)   TempSrc: Temporal   SpO2: 96%   Weight: 177 lb (80.3 kg)   Height: 5' 9\" (1.753 m)       Results for orders placed or performed during the hospital encounter of 03/28/23   ECHO ADULT COMPLETE   Result Value Ref Range    IVSd 0.9 0.6 - 1.0 cm    LVIDd 5.0 4.2 - 5.9 cm    LVIDs 2.4 cm    LVOT Diameter 1.9 cm    LVPWd 0.8 0.6 - 1.0 cm    LV Ejection Fraction A4C 75 %    LV EDV A4C 130 mL    LV ESV A4C 33 mL    LVOT Peak Gradient 5 mmHg    LVOT Peak Velocity 1.1 m/s    RVSP 36 mmHg    LA Diameter 3.9 cm    LA Volume 4C 36 18 - 58 mL    Est. RA Pressure 5 mmHg    AV Area by Planimetry 2.3 cm2    MV Area by Planimetry 5.6 cm2    MV A Velocity 0.54 m/s    MV E Wave Deceleration Time 200.2 ms    MV E Velocity 0.34 m/s    MV PHT 58.1 ms    MV Peak Gradient 2 mmHg    MV Mean Gradient 1 mmHg    MV Max Velocity 0.7 m/s    MV Mean Velocity 0.5 m/s    MV VTI 22.5 cm    PV Peak Gradient 4 mmHg    PV Max Velocity 1.0 m/s    TR Peak Gradient 33 mmHg    TR Max Velocity 2.78 m/s    Aortic Root 2.4 cm    Fractional Shortening 2D 52 28 - 44 %    LV RWT Ratio 0.32     LV Mass 2D 146.8 88 - 224 g    MV E/A 0.63     LVOT Area 2.8 cm2    LA/AO Root Ratio 1.63     MV Area by PHT 3.8 cm2       Gen: Oriented to person, place and time and well-developed, well-nourished and in no distress. HEENT:    Head: normocephalic and atraumatic. Eyes:  EOM are normal. Pupils equal and round. Neck:  Normal range of motion. Neck supple. Cardiovascular: normal rate, regular rhythm and normal heart sounds. Pulmonary/Chest: diminished throughout  Abdominal: soft, normal  bowel sounds. Musculoskeletal:  No edema, no tenderness. No calf tenderness or edema. Neurological:  Alert, oriented to person, place and time. Skin: skin is warm and dry. Assessment/ Plan:       1. Essential hypertension  Soon fu for repeat, adjust INI    2. Chronic obstructive pulmonary disease, unspecified COPD type ([address]  Cont current plan, smoking cessation  He is w/o O2 today and sats are great, he even took the stairs down at the end of visit w/ provider and did well    3. Smoker          I have discussed the diagnosis with the patient and the intended plan as seen in the above orders. The patient has received an after-visit summary and questions were answered concerning future plans. Pt conveyed understanding of plan. Medication Side Effects and Warnings were discussed with patient: yes  Patient Labs were reviewed: yes  Patient Past Records were reviewed:  yes    Noy Isaacs.  Lani Ozuna NP

## 2023-07-10 ENCOUNTER — OFFICE VISIT (OUTPATIENT)
Facility: CLINIC | Age: 63
End: 2023-07-10
Payer: COMMERCIAL

## 2023-07-10 VITALS
BODY MASS INDEX: 24.14 KG/M2 | HEIGHT: 69 IN | OXYGEN SATURATION: 98 % | SYSTOLIC BLOOD PRESSURE: 119 MMHG | DIASTOLIC BLOOD PRESSURE: 74 MMHG | HEART RATE: 83 BPM | RESPIRATION RATE: 18 BRPM | TEMPERATURE: 98.3 F | WEIGHT: 163 LBS

## 2023-07-10 DIAGNOSIS — R35.0 URINARY FREQUENCY: ICD-10-CM

## 2023-07-10 DIAGNOSIS — R63.4 WEIGHT LOSS: ICD-10-CM

## 2023-07-10 DIAGNOSIS — R39.11 BENIGN PROSTATIC HYPERPLASIA WITH URINARY HESITANCY: ICD-10-CM

## 2023-07-10 DIAGNOSIS — F17.200 SMOKER: ICD-10-CM

## 2023-07-10 DIAGNOSIS — I10 PRIMARY HYPERTENSION: Primary | ICD-10-CM

## 2023-07-10 DIAGNOSIS — R73.09 ELEVATED GLUCOSE: ICD-10-CM

## 2023-07-10 DIAGNOSIS — Z12.11 COLON CANCER SCREENING: ICD-10-CM

## 2023-07-10 DIAGNOSIS — N40.1 BENIGN PROSTATIC HYPERPLASIA WITH URINARY HESITANCY: ICD-10-CM

## 2023-07-10 DIAGNOSIS — J43.1 PANLOBULAR EMPHYSEMA (HCC): ICD-10-CM

## 2023-07-10 DIAGNOSIS — R35.1 NOCTURIA: ICD-10-CM

## 2023-07-10 PROBLEM — J96.91 RESPIRATORY FAILURE WITH HYPOXIA (HCC): Status: RESOLVED | Noted: 2023-02-20 | Resolved: 2023-07-10

## 2023-07-10 PROBLEM — J43.9 PULMONARY EMPHYSEMA (HCC): Status: ACTIVE | Noted: 2023-07-10

## 2023-07-10 PROBLEM — J18.9 CAP (COMMUNITY ACQUIRED PNEUMONIA): Status: RESOLVED | Noted: 2023-02-20 | Resolved: 2023-07-10

## 2023-07-10 PROCEDURE — 3074F SYST BP LT 130 MM HG: CPT | Performed by: NURSE PRACTITIONER

## 2023-07-10 PROCEDURE — 3078F DIAST BP <80 MM HG: CPT | Performed by: NURSE PRACTITIONER

## 2023-07-10 PROCEDURE — 99214 OFFICE O/P EST MOD 30 MIN: CPT | Performed by: NURSE PRACTITIONER

## 2023-07-10 RX ORDER — FLUTICASONE PROPIONATE AND SALMETEROL 50; 250 UG/1; UG/1
POWDER RESPIRATORY (INHALATION)
COMMUNITY
Start: 2023-05-24

## 2023-07-10 RX ORDER — ALBUTEROL SULFATE 90 UG/1
1 AEROSOL, METERED RESPIRATORY (INHALATION) DAILY PRN
Qty: 18 G | Refills: 1 | Status: SHIPPED | OUTPATIENT
Start: 2023-07-10

## 2023-07-10 RX ORDER — HYDROCHLOROTHIAZIDE 25 MG/1
TABLET ORAL
COMMUNITY

## 2023-07-10 RX ORDER — TAMSULOSIN HYDROCHLORIDE 0.4 MG/1
CAPSULE ORAL
COMMUNITY
End: 2023-07-10

## 2023-07-10 RX ORDER — AMLODIPINE BESYLATE 10 MG/1
TABLET ORAL
Qty: 90 TABLET | Refills: 1 | Status: SHIPPED | OUTPATIENT
Start: 2023-07-10

## 2023-07-10 RX ORDER — DOXEPIN HYDROCHLORIDE 10 MG/1
1 CAPSULE ORAL NIGHTLY PRN
COMMUNITY
Start: 2023-05-24 | End: 2023-07-10

## 2023-07-10 SDOH — ECONOMIC STABILITY: HOUSING INSECURITY
IN THE LAST 12 MONTHS, WAS THERE A TIME WHEN YOU DID NOT HAVE A STEADY PLACE TO SLEEP OR SLEPT IN A SHELTER (INCLUDING NOW)?: NO

## 2023-07-10 SDOH — ECONOMIC STABILITY: FOOD INSECURITY: WITHIN THE PAST 12 MONTHS, YOU WORRIED THAT YOUR FOOD WOULD RUN OUT BEFORE YOU GOT MONEY TO BUY MORE.: NEVER TRUE

## 2023-07-10 SDOH — ECONOMIC STABILITY: INCOME INSECURITY: HOW HARD IS IT FOR YOU TO PAY FOR THE VERY BASICS LIKE FOOD, HOUSING, MEDICAL CARE, AND HEATING?: NOT VERY HARD

## 2023-07-10 SDOH — ECONOMIC STABILITY: FOOD INSECURITY: WITHIN THE PAST 12 MONTHS, THE FOOD YOU BOUGHT JUST DIDN'T LAST AND YOU DIDN'T HAVE MONEY TO GET MORE.: NEVER TRUE

## 2023-07-10 ASSESSMENT — PATIENT HEALTH QUESTIONNAIRE - PHQ9
2. FEELING DOWN, DEPRESSED OR HOPELESS: 0
1. LITTLE INTEREST OR PLEASURE IN DOING THINGS: 0
SUM OF ALL RESPONSES TO PHQ QUESTIONS 1-9: 0
SUM OF ALL RESPONSES TO PHQ QUESTIONS 1-9: 0
SUM OF ALL RESPONSES TO PHQ9 QUESTIONS 1 & 2: 0
SUM OF ALL RESPONSES TO PHQ QUESTIONS 1-9: 0
SUM OF ALL RESPONSES TO PHQ QUESTIONS 1-9: 0

## 2023-10-02 ENCOUNTER — HOSPITAL ENCOUNTER (EMERGENCY)
Facility: HOSPITAL | Age: 63
Discharge: HOME | End: 2023-10-02
Attending: EMERGENCY MEDICINE
Payer: COMMERCIAL

## 2023-10-02 ENCOUNTER — APPOINTMENT (OUTPATIENT)
Facility: HOSPITAL | Age: 63
End: 2023-10-02
Payer: COMMERCIAL

## 2023-10-02 VITALS
WEIGHT: 170 LBS | SYSTOLIC BLOOD PRESSURE: 143 MMHG | BODY MASS INDEX: 25.18 KG/M2 | HEART RATE: 84 BPM | RESPIRATION RATE: 17 BRPM | HEIGHT: 69 IN | OXYGEN SATURATION: 95 % | TEMPERATURE: 98.6 F | DIASTOLIC BLOOD PRESSURE: 95 MMHG

## 2023-10-02 DIAGNOSIS — J18.9 PNEUMONIA OF RIGHT LOWER LOBE DUE TO INFECTIOUS ORGANISM: ICD-10-CM

## 2023-10-02 DIAGNOSIS — J44.1 COPD EXACERBATION (HCC): Primary | ICD-10-CM

## 2023-10-02 LAB
ALBUMIN SERPL-MCNC: 2.9 G/DL (ref 3.5–5)
ALBUMIN/GLOB SERPL: 0.7 (ref 1.1–2.2)
ALP SERPL-CCNC: 119 U/L (ref 45–117)
ALT SERPL-CCNC: 30 U/L (ref 12–78)
ANION GAP SERPL CALC-SCNC: 4 MMOL/L (ref 5–15)
AST SERPL-CCNC: 26 U/L (ref 15–37)
BASOPHILS # BLD: 0 K/UL (ref 0–0.1)
BASOPHILS NFR BLD: 0 % (ref 0–1)
BILIRUB SERPL-MCNC: 0.5 MG/DL (ref 0.2–1)
BUN SERPL-MCNC: 9 MG/DL (ref 6–20)
BUN/CREAT SERPL: 9 (ref 12–20)
CALCIUM SERPL-MCNC: 8.7 MG/DL (ref 8.5–10.1)
CHLORIDE SERPL-SCNC: 99 MMOL/L (ref 97–108)
CO2 SERPL-SCNC: 35 MMOL/L (ref 21–32)
COMMENT:: NORMAL
CREAT SERPL-MCNC: 1 MG/DL (ref 0.7–1.3)
DIFFERENTIAL METHOD BLD: ABNORMAL
EOSINOPHIL # BLD: 0.1 K/UL (ref 0–0.4)
EOSINOPHIL NFR BLD: 1 % (ref 0–7)
ERYTHROCYTE [DISTWIDTH] IN BLOOD BY AUTOMATED COUNT: 13.3 % (ref 11.5–14.5)
GLOBULIN SER CALC-MCNC: 4.1 G/DL (ref 2–4)
GLUCOSE SERPL-MCNC: 121 MG/DL (ref 65–100)
HCT VFR BLD AUTO: 51.4 % (ref 36.6–50.3)
HGB BLD-MCNC: 15.5 G/DL (ref 12.1–17)
IMM GRANULOCYTES # BLD AUTO: 0 K/UL (ref 0–0.04)
IMM GRANULOCYTES NFR BLD AUTO: 0 % (ref 0–0.5)
LYMPHOCYTES # BLD: 0.8 K/UL (ref 0.8–3.5)
LYMPHOCYTES NFR BLD: 9 % (ref 12–49)
MCH RBC QN AUTO: 26.8 PG (ref 26–34)
MCHC RBC AUTO-ENTMCNC: 30.2 G/DL (ref 30–36.5)
MCV RBC AUTO: 88.8 FL (ref 80–99)
MONOCYTES # BLD: 1 K/UL (ref 0–1)
MONOCYTES NFR BLD: 11 % (ref 5–13)
NEUTS SEG # BLD: 7.4 K/UL (ref 1.8–8)
NEUTS SEG NFR BLD: 79 % (ref 32–75)
NRBC # BLD: 0 K/UL (ref 0–0.01)
NRBC BLD-RTO: 0 PER 100 WBC
PLATELET # BLD AUTO: 185 K/UL (ref 150–400)
PMV BLD AUTO: 10.5 FL (ref 8.9–12.9)
POTASSIUM SERPL-SCNC: 3.9 MMOL/L (ref 3.5–5.1)
PROT SERPL-MCNC: 7 G/DL (ref 6.4–8.2)
RBC # BLD AUTO: 5.79 M/UL (ref 4.1–5.7)
SODIUM SERPL-SCNC: 138 MMOL/L (ref 136–145)
SPECIMEN HOLD: NORMAL
WBC # BLD AUTO: 9.3 K/UL (ref 4.1–11.1)

## 2023-10-02 PROCEDURE — 94640 AIRWAY INHALATION TREATMENT: CPT

## 2023-10-02 PROCEDURE — 99285 EMERGENCY DEPT VISIT HI MDM: CPT

## 2023-10-02 PROCEDURE — 71045 X-RAY EXAM CHEST 1 VIEW: CPT

## 2023-10-02 PROCEDURE — 2700000000 HC OXYGEN THERAPY PER DAY

## 2023-10-02 PROCEDURE — 6360000002 HC RX W HCPCS: Performed by: EMERGENCY MEDICINE

## 2023-10-02 PROCEDURE — 94664 DEMO&/EVAL PT USE INHALER: CPT

## 2023-10-02 PROCEDURE — 6370000000 HC RX 637 (ALT 250 FOR IP): Performed by: EMERGENCY MEDICINE

## 2023-10-02 PROCEDURE — 94762 N-INVAS EAR/PLS OXIMTRY CONT: CPT

## 2023-10-02 PROCEDURE — 85025 COMPLETE CBC W/AUTO DIFF WBC: CPT

## 2023-10-02 PROCEDURE — 80053 COMPREHEN METABOLIC PANEL: CPT

## 2023-10-02 PROCEDURE — 36415 COLL VENOUS BLD VENIPUNCTURE: CPT

## 2023-10-02 PROCEDURE — 96374 THER/PROPH/DIAG INJ IV PUSH: CPT

## 2023-10-02 RX ORDER — GUAIFENESIN 600 MG/1
600 TABLET, EXTENDED RELEASE ORAL 2 TIMES DAILY
Qty: 20 TABLET | Refills: 0 | Status: SHIPPED | OUTPATIENT
Start: 2023-10-02 | End: 2023-10-12

## 2023-10-02 RX ORDER — IPRATROPIUM BROMIDE AND ALBUTEROL SULFATE 2.5; .5 MG/3ML; MG/3ML
1 SOLUTION RESPIRATORY (INHALATION)
Status: COMPLETED | OUTPATIENT
Start: 2023-10-02 | End: 2023-10-02

## 2023-10-02 RX ORDER — GUAIFENESIN 600 MG/1
600 TABLET, EXTENDED RELEASE ORAL
Status: COMPLETED | OUTPATIENT
Start: 2023-10-02 | End: 2023-10-02

## 2023-10-02 RX ORDER — LEVOFLOXACIN 500 MG/1
500 TABLET, FILM COATED ORAL DAILY
Qty: 7 TABLET | Refills: 0 | Status: SHIPPED | OUTPATIENT
Start: 2023-10-02 | End: 2023-10-09

## 2023-10-02 RX ORDER — LEVOFLOXACIN 500 MG/1
500 TABLET, FILM COATED ORAL
Status: COMPLETED | OUTPATIENT
Start: 2023-10-02 | End: 2023-10-02

## 2023-10-02 RX ORDER — PREDNISONE 20 MG/1
40 TABLET ORAL DAILY
Qty: 14 TABLET | Refills: 0 | Status: SHIPPED | OUTPATIENT
Start: 2023-10-02 | End: 2023-10-09

## 2023-10-02 RX ORDER — METHYLPREDNISOLONE SODIUM SUCCINATE 1 G/16ML
125 INJECTION, POWDER, LYOPHILIZED, FOR SOLUTION INTRAMUSCULAR; INTRAVENOUS ONCE
Status: COMPLETED | OUTPATIENT
Start: 2023-10-02 | End: 2023-10-02

## 2023-10-02 RX ADMIN — IPRATROPIUM BROMIDE AND ALBUTEROL SULFATE 1 DOSE: .5; 3 SOLUTION RESPIRATORY (INHALATION) at 04:04

## 2023-10-02 RX ADMIN — METHYLPREDNISOLONE SODIUM SUCCINATE 125 MG: 125 INJECTION, POWDER, FOR SOLUTION INTRAMUSCULAR; INTRAVENOUS at 03:56

## 2023-10-02 RX ADMIN — GUAIFENESIN 600 MG: 600 TABLET, MULTILAYER, EXTENDED RELEASE ORAL at 05:56

## 2023-10-02 RX ADMIN — LEVOFLOXACIN 500 MG: 500 TABLET, FILM COATED ORAL at 05:56

## 2023-10-02 ASSESSMENT — PAIN - FUNCTIONAL ASSESSMENT: PAIN_FUNCTIONAL_ASSESSMENT: 0-10

## 2023-10-02 ASSESSMENT — ENCOUNTER SYMPTOMS
SHORTNESS OF BREATH: 1
CHEST TIGHTNESS: 1
COUGH: 1
WHEEZING: 1

## 2023-10-02 ASSESSMENT — PAIN SCALES - GENERAL: PAINLEVEL_OUTOF10: 4

## 2023-10-02 NOTE — DISCHARGE INSTRUCTIONS
It was a pleasure taking care of you at [facility] Emergency Department today. We know that when you come to [facility], you are entrusting us with your health, comfort, and safety. Our physicians and nurses honor that trust, and we truly appreciate the opportunity to care for you and your loved ones. We also value our feedback. If you receive a survey about your Emergency Department experience today, please fill it out. We care about our patients' feedback, and we listen to what you have to say. Thank you!

## 2023-10-02 NOTE — ED PROVIDER NOTES
EMERGENCY DEPARTMENT HISTORY AND PHYSICAL EXAM    Date: 10/2/2023  Patient Name: Bevin Lefort  Patient Age and Sex: 59 y.o. male  MRN:  [id number]  CSN:  [id number]    History of Present Illness     Chief Complaint   Patient presents with    Shortness of Breath       History Provided By: Patient    Ability to gather history was limited by:     HPI: Bevin Lefort, 59 y.o. male   With history of COPD who uses oxygen intermittently at home, complains of shortness of breath, cough, heaviness on the chest, feels as though he has sputum in his chest that he cannot bring up. Also wheezing and chest tightness. Symptoms have been worsening for the last few days. No fevers. Moderate severity. Of note patient had room air oxygen saturation of 66% upon presentation at triage. Tobacco Use      Smoking status: Every Day        Packs/day: 1        Types: Cigarettes      Smokeless tobacco: Never     Past History   The patient's medical, surgical, and social history were reviewed by me today. Current Medications:  No current facility-administered medications on file prior to encounter. Current Outpatient Medications on File Prior to Encounter   Medication Sig Dispense Refill    hydroCHLOROthiazide (HYDRODIURIL) 25 MG tablet 1 tab(s) orally once a day for 90 days      ADVAIR DISKUS 250-50 MCG/ACT AEPB diskus inhaler INHALE 1 DOSE BY MOUTH TWICE DAILY.  RINSE MOUTH AFTER USE      amLODIPine (NORVASC) 10 MG tablet TAKE 1 TABLET BY MOUTH EVERY DAY FOR BLOOD PRESSURE 90 tablet 1    albuterol sulfate HFA (PROVENTIL;VENTOLIN;PROAIR) 108 (90 Base) MCG/ACT inhaler Inhale 1 puff into the lungs daily as needed for Shortness of Breath or Wheezing 18 g 1    tiotropium (SPIRIVA RESPIMAT) 2.5 MCG/ACT AERS inhaler Inhale 2 puffs into the lungs daily 1 each 2       Past Medical History:   Diagnosis Date    Hypertension     Respiratory failure with hypoxia ([address]) 2/20/2023       Past Surgical History:   Procedure Laterality Date

## 2023-10-02 NOTE — ED NOTES
Discharge instructions were given to the patient by [facility]. The patient left the Emergency Department ambulatory, alert and oriented and in no acute distress with 2 prescriptions. The patient was encouraged to call or return to the ED for worsening issues or problems and was encouraged to schedule a follow up appointment for continuing care. The patient verbalized understanding of discharge instructions and prescriptions, all questions were answered. The patient has no further concerns at this time.         Diana Caro RN  10/02/23 7651

## 2023-10-02 NOTE — ED TRIAGE NOTES
Patient presents to ED with shortness of breath for 2 days. Patient reports Chest pain with cough. Upon arrival Patient was hypoxic on room air. Patient placed on o2.

## 2023-10-02 NOTE — ED NOTES
Pt's oxygen saturations dip to the low 80's on room air. MD and RN suggest that patient take a hospital to home ride with oxygen. We offered it to patient multiple times. Pt insistent on driving home, \"I only live 5 minutes away. \" MD aware.       [address], RN  10/02/23 3514

## 2023-10-03 LAB
EKG ATRIAL RATE: 88 BPM
EKG DIAGNOSIS: NORMAL
EKG P AXIS: 79 DEGREES
EKG P-R INTERVAL: 124 MS
EKG Q-T INTERVAL: 368 MS
EKG QRS DURATION: 78 MS
EKG QTC CALCULATION (BAZETT): 445 MS
EKG R AXIS: -80 DEGREES
EKG T AXIS: -21 DEGREES
EKG VENTRICULAR RATE: 88 BPM

## 2023-12-04 DIAGNOSIS — J43.1 PANLOBULAR EMPHYSEMA (HCC): ICD-10-CM

## 2023-12-05 RX ORDER — ALBUTEROL SULFATE 90 UG/1
1 AEROSOL, METERED RESPIRATORY (INHALATION) DAILY PRN
Qty: 18 EACH | Refills: 1 | Status: SHIPPED | OUTPATIENT
Start: 2023-12-05

## 2024-01-19 ENCOUNTER — HOSPITAL ENCOUNTER (EMERGENCY)
Facility: HOSPITAL | Age: 64
Discharge: HOME | End: 2024-01-19
Attending: EMERGENCY MEDICINE
Payer: COMMERCIAL

## 2024-01-19 VITALS
DIASTOLIC BLOOD PRESSURE: 108 MMHG | BODY MASS INDEX: 22.22 KG/M2 | HEIGHT: 69 IN | RESPIRATION RATE: 18 BRPM | WEIGHT: 150 LBS | OXYGEN SATURATION: 96 % | TEMPERATURE: 98.6 F | SYSTOLIC BLOOD PRESSURE: 149 MMHG | HEART RATE: 74 BPM

## 2024-01-19 DIAGNOSIS — R11.10 ABDOMINAL PAIN, VOMITING, AND DIARRHEA: Primary | ICD-10-CM

## 2024-01-19 DIAGNOSIS — R10.9 ABDOMINAL PAIN, VOMITING, AND DIARRHEA: Primary | ICD-10-CM

## 2024-01-19 DIAGNOSIS — R19.7 ABDOMINAL PAIN, VOMITING, AND DIARRHEA: Primary | ICD-10-CM

## 2024-01-19 LAB
FLUAV RNA SPEC QL NAA+PROBE: NOT DETECTED
FLUBV RNA SPEC QL NAA+PROBE: NOT DETECTED
SARS-COV-2 RNA RESP QL NAA+PROBE: NOT DETECTED

## 2024-01-19 PROCEDURE — 99284 EMERGENCY DEPT VISIT MOD MDM: CPT

## 2024-01-19 PROCEDURE — 87636 SARSCOV2 & INF A&B AMP PRB: CPT

## 2024-01-19 PROCEDURE — 6370000000 HC RX 637 (ALT 250 FOR IP): Performed by: EMERGENCY MEDICINE

## 2024-01-19 RX ORDER — LOPERAMIDE HYDROCHLORIDE 2 MG/1
4 CAPSULE ORAL 4 TIMES DAILY PRN
Status: DISCONTINUED | OUTPATIENT
Start: 2024-01-19 | End: 2024-01-19 | Stop reason: HOSPADM

## 2024-01-19 RX ORDER — DICYCLOMINE HYDROCHLORIDE 10 MG/1
20 CAPSULE ORAL
Status: COMPLETED | OUTPATIENT
Start: 2024-01-19 | End: 2024-01-19

## 2024-01-19 RX ADMIN — DICYCLOMINE HYDROCHLORIDE 20 MG: 10 CAPSULE ORAL at 07:54

## 2024-01-19 RX ADMIN — LOPERAMIDE HYDROCHLORIDE 4 MG: 2 CAPSULE ORAL at 07:56

## 2024-01-19 ASSESSMENT — PAIN DESCRIPTION - LOCATION
LOCATION: ABDOMEN
LOCATION: ABDOMEN

## 2024-01-19 ASSESSMENT — PAIN DESCRIPTION - DESCRIPTORS
DESCRIPTORS: ACHING
DESCRIPTORS: ACHING;CRAMPING

## 2024-01-19 ASSESSMENT — PAIN DESCRIPTION - ORIENTATION
ORIENTATION: RIGHT;LEFT
ORIENTATION: MID

## 2024-01-19 ASSESSMENT — PAIN SCALES - GENERAL
PAINLEVEL_OUTOF10: 4
PAINLEVEL_OUTOF10: 3

## 2024-01-19 ASSESSMENT — PAIN - FUNCTIONAL ASSESSMENT: PAIN_FUNCTIONAL_ASSESSMENT: 0-10

## 2024-01-19 NOTE — ED NOTES
Pt presents ambulatory to ED complaining of mid abdominal pain (and cramping with palpation) including N/V/D, runny nose. Pt reports hx of COPD and states that he is on 2 liters of oxygen at home. Pt SPO2 is currently 96% on 2 liters of oxygen. Pt reports hx of SOB (with exertion) and when he woke up today.  Pt states that he does not carry his oxygen tank at work but uses an inhaler. Pt reports dry, itchy skin on nose and neck.     Pt denies cough, fever, body aches, or chills.     Pt is alert and oriented x 4, RR even and unlabored, skin is warm and dry. Assesment completed and pt updated on plan of care.       Emergency Department Nursing Plan of Care       The Nursing Plan of Care is developed from the Nursing assessment and Emergency Department Attending provider initial evaluation.  The plan of care may be reviewed in the “ED Provider note”.    The Plan of Care was developed with the following considerations:   Patient / Family readiness to learn indicated by:verbalized understanding  Persons(s) to be included in education: patient  Barriers to Learning/Limitations:None    Signed     Jorje Robert RN    1/19/2024   7:30 AM

## 2024-01-19 NOTE — ED PROVIDER NOTES
[facility] EMERGENCY DEPT  EMERGENCY DEPARTMENT ENCOUNTER       Pt Name: Jose Servin  MRN: [id number]  Birthdate 1960  Date of evaluation: 1/19/2024  Provider: Lauren Reinoso MD   PCP: Sylvia Orozco APRN - NP  Note Started: 7:37 AM 1/19/24     (Please note that parts of this dictation were completed with voice recognition software. Quite often unanticipated grammatical, syntax, homophones, and other interpretive errors are inadvertently transcribed by the computer software. Please disregards these errors. Please excuse any errors that have escaped final proofreading.)    CHIEF COMPLAINT       Chief Complaint   Patient presents with    Abdominal Pain        HISTORY OF PRESENT ILLNESS: 1 or more elements      History From: patient, History limited by:  none     Jose Servin is a 63 y.o. male who presents ambulatory to the ED after being sent from work. He arrives hypoxic on RA but states he feels at baseline.  He has a history of COPD and is on home O2 2 L, but states he works with no oxygen.  He will frequently need to rest after exertion at work which he states is baseline for him.  He was hypoxic after walking into the ED.  In exam room he denies shortness of breath.  He denies fever, cough.  He has had few weeks of runny nose.  Denies chest pain, headache, dizziness, weakness.  Patient has some abdominal pain since last night as well as diarrhea x 2 and 1 episode of vomiting.  He is only here because his work made him come to rule out COVID.     Nursing Notes were all reviewed and agreed with or any disagreements were addressed in the HPI.     REVIEW OF SYSTEMS        Positives and Pertinent negatives as per HPI.    PAST HISTORY     Past Medical History:  Past Medical History:   Diagnosis Date    Hypertension     Respiratory failure with hypoxia (HCC) 2/20/2023       Past Surgical History:  Past Surgical History:   Procedure Laterality Date    COLONOSCOPY N/A 10/27/2020    COLONOSCOPY performed by  with patient about blood work.  I stressed to him that I will not be able to rule out pancreatitis, hepatitis, cholecystitis, or other intra-abdominal infection without blood work.  He is adamant that he does not want blood work.  I do believe he is at baseline from a respiratory standpoint.  His lungs are clear.     Risk  Prescription drug management.        **PLEASE SEE ED COURSE BELOW FOR FURTHER MDM DETAILS:    ED Course as of 01/21/24 0737   Fri Jan 19, 2024   0832 Discussed with patient that although his covid test is negative, these could false negatives given that he is not having any respiratory secretions and thus would not be shedding the virus nasally for detection via nasal [SS]      ED Course User Index  [SS] Lauren Reinoso MD         FINAL IMPRESSION     1. Abdominal pain, vomiting, and diarrhea          DISPOSITION/PLAN   Jose Servin's  results have been reviewed with him.  He has been counseled regarding his diagnosis, treatment, and plan.  He verbally conveys understanding and agreement of the signs, symptoms, diagnosis, treatment and prognosis and additionally agrees to follow up as discussed.  He also agrees with the care-plan and conveys that all of his questions have been answered.  I have also provided discharge instructions for him that include: educational information regarding their diagnosis and treatment, and list of reasons why they would want to return to the ED prior to their follow-up appointment, should his condition change.        PATIENT REFERRED TO:  Sylvia Orozco, APRN - NP  [address] [address] [phone]          [facility] EMERGENCY DEPT  [address] [place] [address]  [phone]    If symptoms worsen       DISCHARGE MEDICATIONS:     Medication List        ASK your doctor about these medications      Advair Diskus 250-50 MCG/ACT Aepb diskus inhaler  Generic drug: fluticasone-salmeterol     albuterol sulfate  (90 Base)

## 2024-01-19 NOTE — ED NOTES
Pt given discharge papers. Pt educated on discharge papers and prescriptions. Pt instructed to follow up with PCP. Pt states that he takes his daily BP meds and has taken prescribed dose today. Pt advised to use OTC lotion for dry skin. Pt verbalizes understanding and denies any further questions. Pt ambulated to waiting room with steady gait, alert and oriented x4.

## 2024-02-25 DIAGNOSIS — J43.1 PANLOBULAR EMPHYSEMA (HCC): ICD-10-CM

## 2024-02-26 RX ORDER — ALBUTEROL SULFATE 90 UG/1
1 AEROSOL, METERED RESPIRATORY (INHALATION) DAILY PRN
Qty: 18 EACH | Refills: 1 | Status: SHIPPED | OUTPATIENT
Start: 2024-02-26

## 2024-03-06 ENCOUNTER — OFFICE VISIT (OUTPATIENT)
Facility: CLINIC | Age: 64
End: 2024-03-06
Payer: COMMERCIAL

## 2024-03-06 VITALS
HEIGHT: 69 IN | OXYGEN SATURATION: 98 % | WEIGHT: 141.1 LBS | SYSTOLIC BLOOD PRESSURE: 159 MMHG | TEMPERATURE: 98.3 F | RESPIRATION RATE: 20 BRPM | BODY MASS INDEX: 20.9 KG/M2 | DIASTOLIC BLOOD PRESSURE: 100 MMHG

## 2024-03-06 DIAGNOSIS — J43.1 PANLOBULAR EMPHYSEMA (HCC): ICD-10-CM

## 2024-03-06 DIAGNOSIS — R25.1 TREMOR OF RIGHT HAND: ICD-10-CM

## 2024-03-06 DIAGNOSIS — R63.4 WEIGHT LOSS: ICD-10-CM

## 2024-03-06 DIAGNOSIS — R73.09 ELEVATED GLUCOSE: ICD-10-CM

## 2024-03-06 DIAGNOSIS — R39.9 LOWER URINARY TRACT SYMPTOMS (LUTS): ICD-10-CM

## 2024-03-06 DIAGNOSIS — F17.219 CIGARETTE NICOTINE DEPENDENCE WITH NICOTINE-INDUCED DISORDER: ICD-10-CM

## 2024-03-06 DIAGNOSIS — R91.1 PULMONARY NODULE: ICD-10-CM

## 2024-03-06 DIAGNOSIS — I10 PRIMARY HYPERTENSION: Primary | ICD-10-CM

## 2024-03-06 DIAGNOSIS — I10 PRIMARY HYPERTENSION: ICD-10-CM

## 2024-03-06 DIAGNOSIS — Z12.11 COLON CANCER SCREENING: ICD-10-CM

## 2024-03-06 PROCEDURE — 3080F DIAST BP >= 90 MM HG: CPT | Performed by: NURSE PRACTITIONER

## 2024-03-06 PROCEDURE — 99214 OFFICE O/P EST MOD 30 MIN: CPT | Performed by: NURSE PRACTITIONER

## 2024-03-06 PROCEDURE — 3075F SYST BP GE 130 - 139MM HG: CPT | Performed by: NURSE PRACTITIONER

## 2024-03-06 RX ORDER — FLUTICASONE PROPIONATE AND SALMETEROL 50; 250 UG/1; UG/1
POWDER RESPIRATORY (INHALATION)
Qty: 60 EACH | Refills: 2 | Status: SHIPPED | OUTPATIENT
Start: 2024-03-06

## 2024-03-06 RX ORDER — ALBUTEROL SULFATE 90 UG/1
1 AEROSOL, METERED RESPIRATORY (INHALATION) DAILY PRN
Qty: 18 EACH | Refills: 1 | Status: SHIPPED | OUTPATIENT
Start: 2024-03-06

## 2024-03-06 RX ORDER — LOSARTAN POTASSIUM AND HYDROCHLOROTHIAZIDE 12.5; 1 MG/1; MG/1
1 TABLET ORAL DAILY
Qty: 90 TABLET | Refills: 1 | Status: SHIPPED | OUTPATIENT
Start: 2024-03-06

## 2024-03-06 ASSESSMENT — PATIENT HEALTH QUESTIONNAIRE - PHQ9
SUM OF ALL RESPONSES TO PHQ QUESTIONS 1-9: 0
2. FEELING DOWN, DEPRESSED OR HOPELESS: 0
1. LITTLE INTEREST OR PLEASURE IN DOING THINGS: 0
SUM OF ALL RESPONSES TO PHQ9 QUESTIONS 1 & 2: 0
SUM OF ALL RESPONSES TO PHQ QUESTIONS 1-9: 0

## 2024-03-06 NOTE — PROGRESS NOTES
PSA with Free PSA Reflex; Future    6. Colon cancer screening  Recc EGD as well for early satiety and weight loss  [facility]  - External Referral To Gastroenterology    7. Elevated glucose    - Hemoglobin A1C; Future    8. Weight loss    - Thyroid Cascade Profile; Future  - External Referral To Gastroenterology    9. Tremor of right hand  None today; labs and fu INI        I have discussed the diagnosis with the patient and the intended plan as seen in the above orders.  The patient has received an after-visit summary and questions were answered concerning future plans.  Pt conveyed understanding of plan.      Medication Side Effects and Warnings were discussed with patient: Yes  Patient Labs were reviewed: Yes  Patient Past Records were reviewed:  Yes    DUSTY Will NP

## 2024-03-07 LAB
ANION GAP SERPL CALC-SCNC: 4 MMOL/L (ref 5–15)
BUN SERPL-MCNC: 17 MG/DL (ref 6–20)
BUN/CREAT SERPL: 14 (ref 12–20)
CALCIUM SERPL-MCNC: 9.4 MG/DL (ref 8.5–10.1)
CHLORIDE SERPL-SCNC: 103 MMOL/L (ref 97–108)
CHOLEST SERPL-MCNC: 144 MG/DL
CO2 SERPL-SCNC: 38 MMOL/L (ref 21–32)
CREAT SERPL-MCNC: 1.25 MG/DL (ref 0.7–1.3)
EST. AVERAGE GLUCOSE BLD GHB EST-MCNC: 120 MG/DL
GLUCOSE SERPL-MCNC: 164 MG/DL (ref 65–100)
HBA1C MFR BLD: 5.8 % (ref 4–5.6)
HDLC SERPL-MCNC: 63 MG/DL
HDLC SERPL: 2.3 (ref 0–5)
LDLC SERPL CALC-MCNC: 64.6 MG/DL (ref 0–100)
POTASSIUM SERPL-SCNC: 4.5 MMOL/L (ref 3.5–5.1)
SODIUM SERPL-SCNC: 145 MMOL/L (ref 136–145)
TRIGL SERPL-MCNC: 82 MG/DL
VLDLC SERPL CALC-MCNC: 16.4 MG/DL

## 2024-03-08 LAB
PSA SERPL-MCNC: 0.7 NG/ML (ref 0–4)
REFLEX CRITERIA: NORMAL
TSH SERPL DL<=0.05 MIU/L-ACNC: 0.53 UIU/ML (ref 0.45–4.5)

## 2024-03-14 ENCOUNTER — HOSPITAL ENCOUNTER (OUTPATIENT)
Facility: HOSPITAL | Age: 64
Discharge: HOME | End: 2024-03-14
Payer: COMMERCIAL

## 2024-03-14 DIAGNOSIS — R91.1 PULMONARY NODULE: ICD-10-CM

## 2024-03-14 PROCEDURE — 71250 CT THORAX DX C-: CPT

## 2024-03-20 ENCOUNTER — NURSE ONLY (OUTPATIENT)
Facility: CLINIC | Age: 64
End: 2024-03-20

## 2024-03-20 VITALS — DIASTOLIC BLOOD PRESSURE: 93 MMHG | SYSTOLIC BLOOD PRESSURE: 146 MMHG | HEART RATE: 97 BPM

## 2024-03-20 DIAGNOSIS — I10 PRIMARY HYPERTENSION: ICD-10-CM

## 2024-03-20 RX ORDER — LOSARTAN POTASSIUM AND HYDROCHLOROTHIAZIDE 25; 100 MG/1; MG/1
1 TABLET ORAL DAILY
Qty: 90 TABLET | Refills: 0 | Status: SHIPPED | OUTPATIENT
Start: 2024-03-20

## 2024-03-20 NOTE — PROGRESS NOTES
BP Readings from Last 3 Encounters:   03/20/24 (!) 146/93   03/06/24 (!) 159/100   01/19/24 (!) 149/108     Wt Readings from Last 3 Encounters:   03/06/24 64 kg (141 lb 1.6 oz)   01/19/24 68 kg (150 lb)   10/02/23 77.1 kg (170 lb)

## 2024-03-22 ENCOUNTER — HOSPITAL ENCOUNTER (EMERGENCY)
Facility: HOSPITAL | Age: 64
Discharge: HOME | End: 2024-03-22
Attending: EMERGENCY MEDICINE
Payer: COMMERCIAL

## 2024-03-22 ENCOUNTER — APPOINTMENT (OUTPATIENT)
Facility: HOSPITAL | Age: 64
End: 2024-03-22
Payer: COMMERCIAL

## 2024-03-22 VITALS
TEMPERATURE: 98.5 F | HEART RATE: 88 BPM | DIASTOLIC BLOOD PRESSURE: 101 MMHG | RESPIRATION RATE: 17 BRPM | WEIGHT: 141 LBS | HEIGHT: 69 IN | SYSTOLIC BLOOD PRESSURE: 159 MMHG | OXYGEN SATURATION: 87 % | BODY MASS INDEX: 20.88 KG/M2

## 2024-03-22 DIAGNOSIS — R09.02 HYPOXIA: ICD-10-CM

## 2024-03-22 DIAGNOSIS — D75.1 POLYCYTHEMIA SECONDARY TO HYPOXIA: ICD-10-CM

## 2024-03-22 DIAGNOSIS — J44.1 COPD WITH ACUTE EXACERBATION (HCC): Primary | ICD-10-CM

## 2024-03-22 LAB
ALBUMIN SERPL-MCNC: 3.1 G/DL (ref 3.5–5)
ALBUMIN/GLOB SERPL: 0.6 (ref 1.1–2.2)
ALP SERPL-CCNC: 150 U/L (ref 45–117)
ALT SERPL-CCNC: 35 U/L (ref 12–78)
ANION GAP SERPL CALC-SCNC: 2 MMOL/L (ref 5–15)
AST SERPL-CCNC: 29 U/L (ref 15–37)
BACTERIA SPEC CULT: NORMAL
BASOPHILS # BLD: 0.1 K/UL (ref 0–0.1)
BASOPHILS NFR BLD: 1 % (ref 0–1)
BILIRUB SERPL-MCNC: 0.9 MG/DL (ref 0.2–1)
BUN SERPL-MCNC: 18 MG/DL (ref 6–20)
BUN/CREAT SERPL: 15 (ref 12–20)
CALCIUM SERPL-MCNC: 9.4 MG/DL (ref 8.5–10.1)
CHLORIDE SERPL-SCNC: 100 MMOL/L (ref 97–108)
CO2 SERPL-SCNC: 40 MMOL/L (ref 21–32)
CREAT SERPL-MCNC: 1.18 MG/DL (ref 0.7–1.3)
DIFFERENTIAL METHOD BLD: ABNORMAL
EOSINOPHIL # BLD: 0 K/UL (ref 0–0.4)
EOSINOPHIL NFR BLD: 0 % (ref 0–7)
ERYTHROCYTE [DISTWIDTH] IN BLOOD BY AUTOMATED COUNT: 16.4 % (ref 11.5–14.5)
FLUAV RNA SPEC QL NAA+PROBE: NOT DETECTED
FLUBV RNA SPEC QL NAA+PROBE: NOT DETECTED
GLOBULIN SER CALC-MCNC: 5.3 G/DL (ref 2–4)
GLUCOSE SERPL-MCNC: 118 MG/DL (ref 65–100)
HCT VFR BLD AUTO: 61.5 % (ref 36.6–50.3)
HGB BLD-MCNC: 17.3 G/DL (ref 12.1–17)
IMM GRANULOCYTES # BLD AUTO: 0 K/UL (ref 0–0.04)
IMM GRANULOCYTES NFR BLD AUTO: 0 % (ref 0–0.5)
LACTATE SERPL-SCNC: 1.1 MMOL/L (ref 0.4–2)
LYMPHOCYTES # BLD: 0.7 K/UL (ref 0.8–3.5)
LYMPHOCYTES NFR BLD: 10 % (ref 12–49)
MAGNESIUM SERPL-MCNC: 1.6 MG/DL (ref 1.6–2.4)
MCH RBC QN AUTO: 25.5 PG (ref 26–34)
MCHC RBC AUTO-ENTMCNC: 28.1 G/DL (ref 30–36.5)
MCV RBC AUTO: 90.6 FL (ref 80–99)
MONOCYTES # BLD: 1 K/UL (ref 0–1)
MONOCYTES NFR BLD: 14 % (ref 5–13)
NEUTS SEG # BLD: 5.7 K/UL (ref 1.8–8)
NEUTS SEG NFR BLD: 75 % (ref 32–75)
NRBC # BLD: 0 K/UL (ref 0–0.01)
NRBC BLD-RTO: 0 PER 100 WBC
PLATELET # BLD AUTO: 131 K/UL (ref 150–400)
PMV BLD AUTO: 11.2 FL (ref 8.9–12.9)
POTASSIUM SERPL-SCNC: 4.5 MMOL/L (ref 3.5–5.1)
PROT SERPL-MCNC: 8.4 G/DL (ref 6.4–8.2)
RBC # BLD AUTO: 6.79 M/UL (ref 4.1–5.7)
SARS-COV-2 RNA RESP QL NAA+PROBE: NOT DETECTED
SERVICE CMNT-IMP: NORMAL
SODIUM SERPL-SCNC: 142 MMOL/L (ref 136–145)
TROPONIN I SERPL HS-MCNC: 18 NG/L (ref 0–76)
WBC # BLD AUTO: 7.6 K/UL (ref 4.1–11.1)

## 2024-03-22 PROCEDURE — 94640 AIRWAY INHALATION TREATMENT: CPT

## 2024-03-22 PROCEDURE — 36415 COLL VENOUS BLD VENIPUNCTURE: CPT

## 2024-03-22 PROCEDURE — 84484 ASSAY OF TROPONIN QUANT: CPT

## 2024-03-22 PROCEDURE — 71045 X-RAY EXAM CHEST 1 VIEW: CPT

## 2024-03-22 PROCEDURE — 87040 BLOOD CULTURE FOR BACTERIA: CPT

## 2024-03-22 PROCEDURE — 93005 ELECTROCARDIOGRAM TRACING: CPT | Performed by: EMERGENCY MEDICINE

## 2024-03-22 PROCEDURE — 96365 THER/PROPH/DIAG IV INF INIT: CPT

## 2024-03-22 PROCEDURE — 99285 EMERGENCY DEPT VISIT HI MDM: CPT

## 2024-03-22 PROCEDURE — 87636 SARSCOV2 & INF A&B AMP PRB: CPT

## 2024-03-22 PROCEDURE — 80053 COMPREHEN METABOLIC PANEL: CPT

## 2024-03-22 PROCEDURE — 83735 ASSAY OF MAGNESIUM: CPT

## 2024-03-22 PROCEDURE — 6370000000 HC RX 637 (ALT 250 FOR IP): Performed by: EMERGENCY MEDICINE

## 2024-03-22 PROCEDURE — 85025 COMPLETE CBC W/AUTO DIFF WBC: CPT

## 2024-03-22 PROCEDURE — 83605 ASSAY OF LACTIC ACID: CPT

## 2024-03-22 PROCEDURE — 2580000003 HC RX 258: Performed by: EMERGENCY MEDICINE

## 2024-03-22 PROCEDURE — 87154 CUL TYP ID BLD PTHGN 6+ TRGT: CPT

## 2024-03-22 PROCEDURE — 6360000002 HC RX W HCPCS: Performed by: EMERGENCY MEDICINE

## 2024-03-22 RX ORDER — IPRATROPIUM BROMIDE AND ALBUTEROL SULFATE 2.5; .5 MG/3ML; MG/3ML
1 SOLUTION RESPIRATORY (INHALATION) EVERY 4 HOURS
Qty: 360 ML | Refills: 0 | Status: SHIPPED | OUTPATIENT
Start: 2024-03-22

## 2024-03-22 RX ORDER — GUAIFENESIN 600 MG/1
600 TABLET, EXTENDED RELEASE ORAL 2 TIMES DAILY
Qty: 30 TABLET | Refills: 0 | Status: SHIPPED | OUTPATIENT
Start: 2024-03-22 | End: 2024-04-06

## 2024-03-22 RX ORDER — LEVOFLOXACIN 750 MG/1
750 TABLET, FILM COATED ORAL DAILY
Qty: 7 TABLET | Refills: 0 | Status: SHIPPED | OUTPATIENT
Start: 2024-03-22 | End: 2024-03-29

## 2024-03-22 RX ORDER — MAGNESIUM SULFATE 1 G/100ML
1000 INJECTION INTRAVENOUS
Status: COMPLETED | OUTPATIENT
Start: 2024-03-22 | End: 2024-03-22

## 2024-03-22 RX ORDER — DEXAMETHASONE SODIUM PHOSPHATE 10 MG/ML
10 INJECTION, SOLUTION INTRAMUSCULAR; INTRAVENOUS
Status: COMPLETED | OUTPATIENT
Start: 2024-03-22 | End: 2024-03-22

## 2024-03-22 RX ORDER — IPRATROPIUM BROMIDE AND ALBUTEROL SULFATE 2.5; .5 MG/3ML; MG/3ML
3 SOLUTION RESPIRATORY (INHALATION)
Status: COMPLETED | OUTPATIENT
Start: 2024-03-22 | End: 2024-03-22

## 2024-03-22 RX ORDER — 0.9 % SODIUM CHLORIDE 0.9 %
1000 INTRAVENOUS SOLUTION INTRAVENOUS ONCE
Status: COMPLETED | OUTPATIENT
Start: 2024-03-22 | End: 2024-03-22

## 2024-03-22 RX ORDER — PREDNISONE 20 MG/1
60 TABLET ORAL DAILY
Qty: 15 TABLET | Refills: 0 | Status: SHIPPED | OUTPATIENT
Start: 2024-03-22 | End: 2024-03-27

## 2024-03-22 RX ORDER — ALBUTEROL SULFATE 90 UG/1
2 AEROSOL, METERED RESPIRATORY (INHALATION)
Qty: 54 G | Refills: 3 | Status: SHIPPED | OUTPATIENT
Start: 2024-03-22 | End: 2024-08-04

## 2024-03-22 RX ADMIN — IPRATROPIUM BROMIDE AND ALBUTEROL SULFATE 3 DOSE: .5; 3 SOLUTION RESPIRATORY (INHALATION) at 08:49

## 2024-03-22 RX ADMIN — MAGNESIUM SULFATE HEPTAHYDRATE 1000 MG: 1 INJECTION, SOLUTION INTRAVENOUS at 08:45

## 2024-03-22 RX ADMIN — DEXAMETHASONE SODIUM PHOSPHATE 10 MG: 10 INJECTION, SOLUTION INTRAMUSCULAR; INTRAVENOUS at 08:37

## 2024-03-22 RX ADMIN — SODIUM CHLORIDE 1000 ML: 9 INJECTION, SOLUTION INTRAVENOUS at 08:36

## 2024-03-22 ASSESSMENT — PAIN - FUNCTIONAL ASSESSMENT: PAIN_FUNCTIONAL_ASSESSMENT: NONE - DENIES PAIN

## 2024-03-22 NOTE — CARE COORDINATION
CM received consult from ED to assist w/ discharge planning for pt. CM reviewing chart, will meet w/ attending provider shortly.     CM met w/ pt at bedside. Pt stated that he felt anxious and was ready to leave. CM verified that pt's information was up to date. He stated that his last office visit w/ PCP was 3/6/24. Pt was referred to Pulmonology more than a year ago, but did not follow up. Pt wants a portable O2 (small enough to carry). CM asked if pt was willing to go back to that provider (doesn't remember name - CM will call PCP office to ask); he agreed. Asked for appts that are after 3pm to accommodate work schedule).    CM called PCP office; pt was referred to Pulm.  [facility] (notes say May 2023). CM called [facility]; they had an opening today at 2pm. CM relayed pt's request for smaller O2 tank; called pt to confirm that he can attend. Pt has also been scheduled w/ his PCP for 4/24 at 3:15pm.     Leonie Sneed, KAYDEN, CM

## 2024-03-22 NOTE — ED NOTES
Patient (s)  given copy of dc instructions and 5 script(s). Patient (s)  verbalized understanding of instructions and script (s). Patient given a current medication reconciliation form and verbalized understanding of their medications. Patient (s) verbalized understanding of the importance of discussing medications with his or her physician or clinic they will be following up with. Patient alert and oriented and in no acute distress. Patient discharged home, patient offered wheelchair, patient declines wheelchair.

## 2024-03-22 NOTE — ED TRIAGE NOTES
Patient presents to ED with c/o shortness of breath that began earlier this week. Patient states that he wears 2L oxygen but only at night. Patient initial o2 sat were 73% on room air in triage, patient placed in treat room and nasal cannula placed on patient. Patient o2 increased to 95% on 5L. Hx of copd

## 2024-03-22 NOTE — ED PROVIDER NOTES
[facility] EMERGENCY DEPT  EMERGENCY DEPARTMENT ENCOUNTER       Pt Name: Jose Servin  MRN: [id number]  Birthdate 1960  Date of evaluation: 3/22/2024  Provider: Lauren Reinoso MD   PCP: Sylvia Orozco APRN - NP  Note Started: 8:16 AM 3/22/24     (Please note that parts of this dictation were completed with voice recognition software. Quite often unanticipated grammatical, syntax, homophones, and other interpretive errors are inadvertently transcribed by the computer software. Please disregards these errors. Please excuse any errors that have escaped final proofreading.)    CHIEF COMPLAINT       Chief Complaint   Patient presents with    Shortness of Breath        HISTORY OF PRESENT ILLNESS: 1 or more elements      History From: ***, History limited by: ***none     Jose Servin is a 63 y.o. male who presents with dyspnea.  History of COPD.  Normally wears oxygen tank for work and has been working without an oxygen tank.  States normally he is able to work nonetheless but today was the first day that he just could not do it secondary to shortness of breath.  Reports recent fever, headache and coughing up mucus since a Q-tip cut it with a knife.  Got some Mucinex yesterday but states it has not worked.  Patient arrives with O2 sat 73% on room air but speaking easily with no apparent dyspnea.  He is currently satting well on 5 L.     Nursing Notes were all reviewed and agreed with or any disagreements were addressed in the HPI.     REVIEW OF SYSTEMS        Positives and Pertinent negatives as per HPI.    PAST HISTORY     Past Medical History:  Past Medical History:   Diagnosis Date    Hypertension     Respiratory failure with hypoxia (HCC) 2/20/2023       Past Surgical History:  Past Surgical History:   Procedure Laterality Date    COLONOSCOPY N/A 10/27/2020    COLONOSCOPY performed by Hugo Hernandez MD at [facility] MAIN OR    HEENT      [facility]

## 2024-03-23 LAB
ACCESSION NUMBER, LLC1M: NORMAL
ACINETOBACTER CALCOAC BAUMANNII COMPLEX BY PCR: NOT DETECTED
B FRAGILIS DNA BLD POS QL NAA+NON-PROBE: NOT DETECTED
BIOFIRE TEST COMMENT: NORMAL
C ALBICANS DNA BLD POS QL NAA+NON-PROBE: NOT DETECTED
C AURIS DNA BLD POS QL NAA+NON-PROBE: NOT DETECTED
C GATTII+NEOFOR DNA BLD POS QL NAA+N-PRB: NOT DETECTED
C GLABRATA DNA BLD POS QL NAA+NON-PROBE: NOT DETECTED
C KRUSEI DNA BLD POS QL NAA+NON-PROBE: NOT DETECTED
C PARAP DNA BLD POS QL NAA+NON-PROBE: NOT DETECTED
C TROPICLS DNA BLD POS QL NAA+NON-PROBE: NOT DETECTED
E CLOAC COMP DNA BLD POS NAA+NON-PROBE: NOT DETECTED
E COLI DNA BLD POS QL NAA+NON-PROBE: NOT DETECTED
E FAECALIS DNA BLD POS QL NAA+NON-PROBE: NOT DETECTED
E FAECIUM DNA BLD POS QL NAA+NON-PROBE: NOT DETECTED
ENTEROBACTERALES DNA BLD POS NAA+N-PRB: NOT DETECTED
GP B STREP DNA BLD POS QL NAA+NON-PROBE: NOT DETECTED
HAEM INFLU DNA BLD POS QL NAA+NON-PROBE: NOT DETECTED
K OXYTOCA DNA BLD POS QL NAA+NON-PROBE: NOT DETECTED
KLEBSIELLA SP DNA BLD POS QL NAA+NON-PRB: NOT DETECTED
KLEBSIELLA SP DNA BLD POS QL NAA+NON-PRB: NOT DETECTED
L MONOCYTOG DNA BLD POS QL NAA+NON-PROBE: NOT DETECTED
N MEN DNA BLD POS QL NAA+NON-PROBE: NOT DETECTED
P AERUGINOSA DNA BLD POS NAA+NON-PROBE: NOT DETECTED
PROTEUS SP DNA BLD POS QL NAA+NON-PROBE: NOT DETECTED
RESISTANT GENE TARGETS: NORMAL
S AUREUS DNA BLD POS QL NAA+NON-PROBE: NOT DETECTED
S AUREUS+CONS DNA BLD POS NAA+NON-PROBE: NOT DETECTED
S EPIDERMIDIS DNA BLD POS QL NAA+NON-PRB: NOT DETECTED
S LUGDUNENSIS DNA BLD POS QL NAA+NON-PRB: NOT DETECTED
S MALTOPHILIA DNA BLD POS QL NAA+NON-PRB: NOT DETECTED
S MARCESCENS DNA BLD POS NAA+NON-PROBE: NOT DETECTED
S PNEUM DNA BLD POS QL NAA+NON-PROBE: NOT DETECTED
S PYO DNA BLD POS QL NAA+NON-PROBE: NOT DETECTED
SALMONELLA DNA BLD POS QL NAA+NON-PROBE: NOT DETECTED
STREPTOCOCCUS DNA BLD POS NAA+NON-PROBE: NOT DETECTED

## 2024-03-24 LAB
BACTERIA SPEC CULT: ABNORMAL
SERVICE CMNT-IMP: ABNORMAL

## 2024-03-25 LAB
EKG ATRIAL RATE: 99 BPM
EKG DIAGNOSIS: NORMAL
EKG P AXIS: 79 DEGREES
EKG P-R INTERVAL: 118 MS
EKG Q-T INTERVAL: 356 MS
EKG QRS DURATION: 80 MS
EKG QTC CALCULATION (BAZETT): 456 MS
EKG R AXIS: -85 DEGREES
EKG T AXIS: 2 DEGREES
EKG VENTRICULAR RATE: 99 BPM

## 2024-03-25 PROCEDURE — 93010 ELECTROCARDIOGRAM REPORT: CPT | Performed by: SPECIALIST

## 2024-03-27 LAB
BACTERIA SPEC CULT: NORMAL
SERVICE CMNT-IMP: NORMAL

## 2024-04-24 ENCOUNTER — OFFICE VISIT (OUTPATIENT)
Facility: CLINIC | Age: 64
End: 2024-04-24
Payer: COMMERCIAL

## 2024-04-24 VITALS
HEIGHT: 69 IN | RESPIRATION RATE: 18 BRPM | DIASTOLIC BLOOD PRESSURE: 81 MMHG | OXYGEN SATURATION: 95 % | BODY MASS INDEX: 21.03 KG/M2 | HEART RATE: 86 BPM | WEIGHT: 142 LBS | TEMPERATURE: 97 F | SYSTOLIC BLOOD PRESSURE: 136 MMHG

## 2024-04-24 DIAGNOSIS — J43.9 PULMONARY EMPHYSEMA, UNSPECIFIED EMPHYSEMA TYPE (HCC): ICD-10-CM

## 2024-04-24 DIAGNOSIS — J43.1 PANLOBULAR EMPHYSEMA (HCC): ICD-10-CM

## 2024-04-24 DIAGNOSIS — I10 PRIMARY HYPERTENSION: Primary | ICD-10-CM

## 2024-04-24 PROCEDURE — 99213 OFFICE O/P EST LOW 20 MIN: CPT | Performed by: NURSE PRACTITIONER

## 2024-04-24 PROCEDURE — 3075F SYST BP GE 130 - 139MM HG: CPT | Performed by: NURSE PRACTITIONER

## 2024-04-24 PROCEDURE — 3079F DIAST BP 80-89 MM HG: CPT | Performed by: NURSE PRACTITIONER

## 2024-04-24 RX ORDER — AMLODIPINE BESYLATE 10 MG/1
10 TABLET ORAL DAILY
Qty: 90 TABLET | Refills: 1 | Status: SHIPPED | OUTPATIENT
Start: 2024-04-24

## 2024-04-24 RX ORDER — TIOTROPIUM BROMIDE 18 UG/1
18 CAPSULE ORAL; RESPIRATORY (INHALATION) DAILY
Qty: 30 CAPSULE | Refills: 3 | Status: SHIPPED | OUTPATIENT
Start: 2024-04-24

## 2024-04-24 RX ORDER — ALBUTEROL SULFATE 90 UG/1
2 AEROSOL, METERED RESPIRATORY (INHALATION)
Qty: 54 G | Refills: 3 | Status: SHIPPED | OUTPATIENT
Start: 2024-04-24 | End: 2024-09-06

## 2024-04-24 RX ORDER — FLUTICASONE PROPIONATE AND SALMETEROL 50; 250 UG/1; UG/1
POWDER RESPIRATORY (INHALATION)
Qty: 60 EACH | Refills: 2 | Status: SHIPPED | OUTPATIENT
Start: 2024-04-24

## 2024-04-24 RX ORDER — LOSARTAN POTASSIUM 25 MG/1
25 TABLET ORAL DAILY
Qty: 90 TABLET | Refills: 1 | Status: SHIPPED | OUTPATIENT
Start: 2024-04-24

## 2024-04-24 RX ORDER — DOXEPIN HYDROCHLORIDE 10 MG/1
10 CAPSULE ORAL NIGHTLY
COMMUNITY
Start: 2023-05-24

## 2024-04-24 NOTE — PROGRESS NOTES
Pt is here for   Chief Complaint   Patient presents with    Follow-up     ED Follow up for COPD exacerbation 3/22/2024 [facility]     \"Have you been to the ER, urgent care clinic since your last visit?  Hospitalized since your last visit?\"    YES - When: approximately 1 days ago.  Where and Why: 3/22/2024.    “Have you seen or consulted any other health care providers outside of [facility] since your last visit?”    NO        “Have you had a colorectal cancer screening such as a colonoscopy/FIT/Cologuard?    NO    Date of last Colonoscopy: 10/27/2020  No cologuard on file  No FIT/FOBT on file   No flexible sigmoidoscopy on file         Click Here for Release of Records Request    Pt states that [facility] did contact him for colonoscopy but he doesn't remember the date and time   
Sign     Unstable Housing in the Last Year: No          Current Outpatient Medications   Medication Sig    doxepin (SINEQUAN) 10 MG capsule Take 1 capsule by mouth nightly    albuterol sulfate HFA (VENTOLIN HFA) 108 (90 Base) MCG/ACT inhaler Inhale 2 puffs into the lungs every 3-4 hours as needed for Wheezing    ADVAIR DISKUS 250-50 MCG/ACT AEPB diskus inhaler INHALE 1 DOSE BY MOUTH TWICE DAILY. RINSE MOUTH AFTER USE    tiotropium (SPIRIVA HANDIHALER) 18 MCG inhalation capsule Inhale 1 capsule into the lungs daily    amLODIPine (NORVASC) 10 MG tablet Take 1 tablet by mouth daily    losartan (COZAAR) 25 MG tablet Take 1 tablet by mouth daily    ipratropium 0.5 mg-albuterol 2.5 mg (DUONEB) 0.5-2.5 (3) MG/3ML SOLN nebulizer solution Inhale 3 mLs into the lungs every 4 hours     No current facility-administered medications for this visit.       Review of Systems:   Constitutional:    Negative for fever and chills, negative diaphoresis.   HEENT:              Negative for neck pain and stiffness.  Eyes:                  Negative for visual disturbance, itching, redness or discharge.   Respiratory:        Negative for cough and shortness of breath.   Cardiovascular:  Negative for chest pain and palpitations.   Gastrointestinal: Negative for nausea, vomiting, abdominal pain, diarrhea or constipation.  Genitourinary:     Negative for dysuria and frequency.   Musculoskeletal: Negative for falls, tenderness and swelling.  Skin:                    Negative for rash, masses or lesions.   Neurological:       Negative for dizzyness, seizure, loss of consciousness, weakness and numbness.     Objective:     Vitals:    04/24/24 1522   BP: 136/81   Site: Left Upper Arm   Position: Sitting   Cuff Size: Large Adult   Pulse: 86   Resp: 18   Temp: 97 °F (36.1 °C)   TempSrc: Temporal   SpO2: 95%   Weight: 64.4 kg (142 lb)   Height: 1.753 m (5' 9\")       No results found for this visit on 04/24/24.    Gen: Oriented to person, place and time

## 2024-10-16 DIAGNOSIS — I10 PRIMARY HYPERTENSION: ICD-10-CM

## 2024-10-16 NOTE — TELEPHONE ENCOUNTER
Last appointment: 04/24/2024 BERTHA Orozco   Next appointment: Nothing scheduled   Previous refill encounter(s):   04/24/2024 Norvasc #90 with 1 refill.     For Pharmacy Admin Tracking Only    Program: Medication Refill  Intervention Detail: New Rx: 1, reason: Patient Preference  Time Spent (min): 5  Requested Prescriptions     Pending Prescriptions Disp Refills    amLODIPine (NORVASC) 10 MG tablet [Pharmacy Med Name: AMLODIPINE BESYLATE 10 MG TAB] 90 tablet 0     Sig: TAKE 1 TABLET BY MOUTH EVERY DAY

## 2024-10-17 RX ORDER — AMLODIPINE BESYLATE 10 MG/1
10 TABLET ORAL DAILY
Qty: 90 TABLET | Refills: 0 | Status: SHIPPED | OUTPATIENT
Start: 2024-10-17

## 2024-12-06 ENCOUNTER — HOSPITAL ENCOUNTER (EMERGENCY)
Facility: HOSPITAL | Age: 64
Discharge: ANOTHER HOSPITAL | End: 2024-12-07
Attending: EMERGENCY MEDICINE
Payer: COMMERCIAL

## 2024-12-06 ENCOUNTER — APPOINTMENT (OUTPATIENT)
Facility: HOSPITAL | Age: 64
End: 2024-12-06
Payer: COMMERCIAL

## 2024-12-06 DIAGNOSIS — I10 MALIGNANT HYPERTENSION: ICD-10-CM

## 2024-12-06 DIAGNOSIS — D75.1 POLYCYTHEMIA SECONDARY TO HYPOXIA: ICD-10-CM

## 2024-12-06 DIAGNOSIS — J44.1 ACUTE EXACERBATION OF CHRONIC OBSTRUCTIVE PULMONARY DISEASE (COPD) (HCC): ICD-10-CM

## 2024-12-06 DIAGNOSIS — J96.01 ACUTE RESPIRATORY FAILURE WITH HYPOXIA AND HYPERCAPNIA: Primary | ICD-10-CM

## 2024-12-06 DIAGNOSIS — R06.03 ACUTE RESPIRATORY DISTRESS: ICD-10-CM

## 2024-12-06 DIAGNOSIS — Z78.9 DO NOT INTUBATE, CARDIOPULMONARY RESUSCITATION (CPR)-ONLY CODE STATUS: ICD-10-CM

## 2024-12-06 DIAGNOSIS — J96.02 ACUTE RESPIRATORY FAILURE WITH HYPOXIA AND HYPERCAPNIA: Primary | ICD-10-CM

## 2024-12-06 DIAGNOSIS — Z71.89 ADVANCED CARE PLANNING/COUNSELING DISCUSSION: ICD-10-CM

## 2024-12-06 LAB
EKG ATRIAL RATE: 82 BPM
EKG DIAGNOSIS: NORMAL
EKG P AXIS: 88 DEGREES
EKG P-R INTERVAL: 134 MS
EKG Q-T INTERVAL: 392 MS
EKG QRS DURATION: 104 MS
EKG QTC CALCULATION (BAZETT): 457 MS
EKG R AXIS: -83 DEGREES
EKG T AXIS: 83 DEGREES
EKG VENTRICULAR RATE: 82 BPM

## 2024-12-06 PROCEDURE — 99285 EMERGENCY DEPT VISIT HI MDM: CPT

## 2024-12-06 PROCEDURE — 93005 ELECTROCARDIOGRAM TRACING: CPT | Performed by: EMERGENCY MEDICINE

## 2024-12-06 PROCEDURE — 36415 COLL VENOUS BLD VENIPUNCTURE: CPT

## 2024-12-06 PROCEDURE — 84484 ASSAY OF TROPONIN QUANT: CPT

## 2024-12-06 PROCEDURE — 85025 COMPLETE CBC W/AUTO DIFF WBC: CPT

## 2024-12-06 PROCEDURE — 83690 ASSAY OF LIPASE: CPT

## 2024-12-06 PROCEDURE — 94762 N-INVAS EAR/PLS OXIMTRY CONT: CPT

## 2024-12-06 PROCEDURE — 80053 COMPREHEN METABOLIC PANEL: CPT

## 2024-12-06 PROCEDURE — 96365 THER/PROPH/DIAG IV INF INIT: CPT

## 2024-12-06 PROCEDURE — 83880 ASSAY OF NATRIURETIC PEPTIDE: CPT

## 2024-12-06 PROCEDURE — 83735 ASSAY OF MAGNESIUM: CPT

## 2024-12-06 PROCEDURE — 96375 TX/PRO/DX INJ NEW DRUG ADDON: CPT

## 2024-12-06 PROCEDURE — 71045 X-RAY EXAM CHEST 1 VIEW: CPT

## 2024-12-06 PROCEDURE — 87636 SARSCOV2 & INF A&B AMP PRB: CPT

## 2024-12-06 PROCEDURE — 2580000003 HC RX 258: Performed by: EMERGENCY MEDICINE

## 2024-12-06 PROCEDURE — 94640 AIRWAY INHALATION TREATMENT: CPT

## 2024-12-06 PROCEDURE — 6360000002 HC RX W HCPCS: Performed by: EMERGENCY MEDICINE

## 2024-12-06 PROCEDURE — 6370000000 HC RX 637 (ALT 250 FOR IP): Performed by: EMERGENCY MEDICINE

## 2024-12-06 RX ORDER — IPRATROPIUM BROMIDE AND ALBUTEROL SULFATE 2.5; .5 MG/3ML; MG/3ML
1 SOLUTION RESPIRATORY (INHALATION)
Status: COMPLETED | OUTPATIENT
Start: 2024-12-06 | End: 2024-12-06

## 2024-12-06 RX ORDER — MAGNESIUM SULFATE IN WATER 40 MG/ML
2000 INJECTION, SOLUTION INTRAVENOUS
Status: COMPLETED | OUTPATIENT
Start: 2024-12-06 | End: 2024-12-07

## 2024-12-06 RX ADMIN — MAGNESIUM SULFATE HEPTAHYDRATE 2000 MG: 40 INJECTION, SOLUTION INTRAVENOUS at 23:47

## 2024-12-06 RX ADMIN — IPRATROPIUM BROMIDE AND ALBUTEROL SULFATE 1 DOSE: .5; 3 SOLUTION RESPIRATORY (INHALATION) at 23:59

## 2024-12-06 RX ADMIN — WATER 125 MG: 1 INJECTION INTRAMUSCULAR; INTRAVENOUS; SUBCUTANEOUS at 23:49

## 2024-12-06 ASSESSMENT — PAIN - FUNCTIONAL ASSESSMENT: PAIN_FUNCTIONAL_ASSESSMENT: NONE - DENIES PAIN

## 2024-12-07 ENCOUNTER — APPOINTMENT (OUTPATIENT)
Facility: HOSPITAL | Age: 64
End: 2024-12-07
Attending: FAMILY MEDICINE
Payer: COMMERCIAL

## 2024-12-07 ENCOUNTER — HOSPITAL ENCOUNTER (INPATIENT)
Facility: HOSPITAL | Age: 64
LOS: 7 days | Discharge: HOME | End: 2024-12-14
Attending: FAMILY MEDICINE | Admitting: HOSPITALIST
Payer: COMMERCIAL

## 2024-12-07 VITALS
HEART RATE: 75 BPM | SYSTOLIC BLOOD PRESSURE: 157 MMHG | TEMPERATURE: 97.8 F | DIASTOLIC BLOOD PRESSURE: 114 MMHG | BODY MASS INDEX: 21.03 KG/M2 | OXYGEN SATURATION: 90 % | WEIGHT: 142 LBS | HEIGHT: 69 IN | RESPIRATION RATE: 19 BRPM

## 2024-12-07 DIAGNOSIS — I10 PRIMARY HYPERTENSION: ICD-10-CM

## 2024-12-07 DIAGNOSIS — J44.1 COPD EXACERBATION (HCC): Primary | ICD-10-CM

## 2024-12-07 DIAGNOSIS — J43.1 PANLOBULAR EMPHYSEMA (HCC): ICD-10-CM

## 2024-12-07 LAB
ALBUMIN SERPL-MCNC: 2.9 G/DL (ref 3.5–5)
ALBUMIN SERPL-MCNC: 3.2 G/DL (ref 3.5–5)
ALBUMIN/GLOB SERPL: 0.7 (ref 1.1–2.2)
ALBUMIN/GLOB SERPL: 0.7 (ref 1.1–2.2)
ALP SERPL-CCNC: 159 U/L (ref 45–117)
ALP SERPL-CCNC: 181 U/L (ref 45–117)
ALT SERPL-CCNC: 57 U/L (ref 12–78)
ALT SERPL-CCNC: 77 U/L (ref 12–78)
ANION GAP SERPL CALC-SCNC: 4 MMOL/L (ref 2–12)
ANION GAP SERPL CALC-SCNC: 5 MMOL/L (ref 2–12)
ARTERIAL PATENCY WRIST A: POSITIVE
ARTERIAL PATENCY WRIST A: YES
AST SERPL-CCNC: 29 U/L (ref 15–37)
AST SERPL-CCNC: 50 U/L (ref 15–37)
BASE DEFICIT BLDV-SCNC: 0.4 MMOL/L
BASE EXCESS BLD CALC-SCNC: 2.8 MMOL/L
BASE EXCESS BLDA CALC-SCNC: 1.7 MMOL/L
BASE EXCESS BLDA CALC-SCNC: 5.6 MMOL/L
BASE EXCESS BLDA CALC-SCNC: 8.7 MMOL/L
BASOPHILS # BLD: 0 K/UL (ref 0–0.1)
BASOPHILS NFR BLD: 0 % (ref 0–1)
BDY SITE: ABNORMAL
BILIRUB SERPL-MCNC: 0.7 MG/DL (ref 0.2–1)
BILIRUB SERPL-MCNC: 0.8 MG/DL (ref 0.2–1)
BREATHS.SPONTANEOUS ON VENT: 14
BUN SERPL-MCNC: 23 MG/DL (ref 6–20)
BUN SERPL-MCNC: 31 MG/DL (ref 6–20)
BUN/CREAT SERPL: 16 (ref 12–20)
BUN/CREAT SERPL: 24 (ref 12–20)
CA-I BLD-SCNC: 1.3 MMOL/L (ref 1.13–1.32)
CALCIUM SERPL-MCNC: 9 MG/DL (ref 8.5–10.1)
CALCIUM SERPL-MCNC: 9.6 MG/DL (ref 8.5–10.1)
CHLORIDE SERPL-SCNC: 96 MMOL/L (ref 97–108)
CHLORIDE SERPL-SCNC: 98 MMOL/L (ref 97–108)
CK SERPL-CCNC: 239 U/L (ref 39–308)
CO2 SERPL-SCNC: 36 MMOL/L (ref 21–32)
CO2 SERPL-SCNC: 38 MMOL/L (ref 21–32)
COMMENT:: NORMAL
CREAT SERPL-MCNC: 1.27 MG/DL (ref 0.7–1.3)
CREAT SERPL-MCNC: 1.45 MG/DL (ref 0.7–1.3)
DIFFERENTIAL METHOD BLD: ABNORMAL
EOSINOPHIL # BLD: 0 K/UL (ref 0–0.4)
EOSINOPHIL NFR BLD: 0 % (ref 0–7)
ERYTHROCYTE [DISTWIDTH] IN BLOOD BY AUTOMATED COUNT: 14.8 % (ref 11.5–14.5)
ERYTHROCYTE [DISTWIDTH] IN BLOOD BY AUTOMATED COUNT: 16.2 % (ref 11.5–14.5)
FIO2 ON VENT: 1 %
FIO2 ON VENT: 30 %
FIO2 ON VENT: 40 %
FIO2 ON VENT: 50 %
FLUAV RNA SPEC QL NAA+PROBE: NOT DETECTED
FLUBV RNA SPEC QL NAA+PROBE: NOT DETECTED
GAS FLOW.O2 O2 DELIVERY SYS: ABNORMAL
GAS FLOW.O2 SETTING OXYMISER: 12
GAS FLOW.O2 SETTING OXYMISER: 16 BPM
GAS FLOW.O2 SETTING OXYMISER: 18
GAS FLOW.O2 SETTING OXYMISER: 28
GLOBULIN SER CALC-MCNC: 4.4 G/DL (ref 2–4)
GLOBULIN SER CALC-MCNC: 4.9 G/DL (ref 2–4)
GLUCOSE SERPL-MCNC: 115 MG/DL (ref 65–100)
GLUCOSE SERPL-MCNC: 176 MG/DL (ref 65–100)
HCO3 BLD-SCNC: 35.7 MMOL/L (ref 21–28)
HCO3 BLDA-SCNC: 36 MMOL/L (ref 22–26)
HCO3 BLDV-SCNC: 33 MMOL/L (ref 23–28)
HCT VFR BLD AUTO: 60.1 % (ref 36.6–50.3)
HCT VFR BLD AUTO: 64.2 % (ref 36.6–50.3)
HGB BLD-MCNC: 18 G/DL (ref 12.1–17)
HGB BLD-MCNC: 19.4 G/DL (ref 12.1–17)
IMM GRANULOCYTES # BLD AUTO: 0 K/UL (ref 0–0.04)
IMM GRANULOCYTES NFR BLD AUTO: 0 % (ref 0–0.5)
IPAP/PIP: 20
LIPASE SERPL-CCNC: 8 U/L (ref 13–75)
LYMPHOCYTES # BLD: 0.5 K/UL (ref 0.8–3.5)
LYMPHOCYTES NFR BLD: 5 % (ref 12–49)
MAGNESIUM SERPL-MCNC: 1.7 MG/DL (ref 1.6–2.4)
MAGNESIUM SERPL-MCNC: 2.7 MG/DL (ref 1.6–2.4)
MCH RBC QN AUTO: 27.3 PG (ref 26–34)
MCH RBC QN AUTO: 27.6 PG (ref 26–34)
MCHC RBC AUTO-ENTMCNC: 30 G/DL (ref 30–36.5)
MCHC RBC AUTO-ENTMCNC: 30.2 G/DL (ref 30–36.5)
MCV RBC AUTO: 90.4 FL (ref 80–99)
MCV RBC AUTO: 92.2 FL (ref 80–99)
MONOCYTES # BLD: 0.5 K/UL (ref 0–1)
MONOCYTES NFR BLD: 6 % (ref 5–13)
NEUTS SEG # BLD: 8 K/UL (ref 1.8–8)
NEUTS SEG NFR BLD: 89 % (ref 32–75)
NRBC # BLD: 0 K/UL (ref 0–0.01)
NRBC # BLD: 0 K/UL (ref 0–0.01)
NRBC BLD-RTO: 0 PER 100 WBC
NRBC BLD-RTO: 0 PER 100 WBC
NT PRO BNP: 5797 PG/ML (ref 0–125)
O2/TOTAL GAS SETTING VFR VENT: 80 %
PCO2 BLD: 86.6 MMHG (ref 35–48)
PCO2 BLDA: 107 MMHG (ref 35–45)
PCO2 BLDA: 62 MMHG (ref 35–45)
PCO2 BLDA: 78 MMHG (ref 35–45)
PCO2 BLDV: 102.6 MMHG (ref 41–51)
PEEP MAX SETTING VENT: 10
PEEP RESPIRATORY: 10
PEEP RESPIRATORY: 10
PEEP RESPIRATORY: 10 CMH2O
PEEP RESPIRATORY: 5
PH BLD: 7.22 (ref 7.35–7.45)
PH BLDA: 7.15 (ref 7.35–7.45)
PH BLDA: 7.27 (ref 7.35–7.45)
PH BLDA: 7.39 (ref 7.35–7.45)
PH BLDV: 7.12 (ref 7.32–7.42)
PHOSPHATE SERPL-MCNC: 2.8 MG/DL (ref 2.6–4.7)
PLATELET # BLD AUTO: 109 K/UL (ref 150–400)
PLATELET # BLD AUTO: 120 K/UL (ref 150–400)
PMV BLD AUTO: 11.5 FL (ref 8.9–12.9)
PO2 BLD: 168 MMHG (ref 83–108)
PO2 BLDA: 70 MMHG (ref 80–100)
PO2 BLDA: 71 MMHG (ref 80–100)
PO2 BLDA: 79 MMHG (ref 80–100)
PO2 BLDV: 50 MMHG (ref 25–40)
POTASSIUM SERPL-SCNC: 4 MMOL/L (ref 3.5–5.1)
POTASSIUM SERPL-SCNC: 4.7 MMOL/L (ref 3.5–5.1)
PRESSURE SUPPORT SETTING VENT: 10 CMH2O
PRESSURE SUPPORT SETTING VENT: 6
PROT SERPL-MCNC: 7.3 G/DL (ref 6.4–8.2)
PROT SERPL-MCNC: 8.1 G/DL (ref 6.4–8.2)
RBC # BLD AUTO: 6.52 M/UL (ref 4.1–5.7)
RBC # BLD AUTO: 7.1 M/UL (ref 4.1–5.7)
RBC MORPH BLD: ABNORMAL
SAO2 % BLD: 88 % (ref 92–97)
SAO2 % BLD: 91 % (ref 92–97)
SAO2 % BLD: 95 % (ref 92–97)
SAO2 % BLD: 99 % (ref 92–97)
SAO2 % BLDV: 70 % (ref 65–88)
SAO2% DEVICE SAO2% SENSOR NAME: ABNORMAL
SARS-COV-2 RNA RESP QL NAA+PROBE: NOT DETECTED
SERVICE CMNT-IMP: ABNORMAL
SODIUM SERPL-SCNC: 137 MMOL/L (ref 136–145)
SODIUM SERPL-SCNC: 140 MMOL/L (ref 136–145)
SOURCE: NORMAL
SPECIMEN HOLD: NORMAL
SPECIMEN SITE: ABNORMAL
SPECIMEN TYPE: ABNORMAL
TROPONIN I SERPL HS-MCNC: 22 NG/L (ref 0–76)
VENTILATION MODE VENT: ABNORMAL
VT SETTING VENT: 450
WBC # BLD AUTO: 12.2 K/UL (ref 4.1–11.1)
WBC # BLD AUTO: 9 K/UL (ref 4.1–11.1)

## 2024-12-07 PROCEDURE — 6360000002 HC RX W HCPCS: Performed by: EMERGENCY MEDICINE

## 2024-12-07 PROCEDURE — 96374 THER/PROPH/DIAG INJ IV PUSH: CPT

## 2024-12-07 PROCEDURE — 94644 CONT INHLJ TX 1ST HOUR: CPT

## 2024-12-07 PROCEDURE — 2500000003 HC RX 250 WO HCPCS

## 2024-12-07 PROCEDURE — 6360000002 HC RX W HCPCS: Performed by: INTERNAL MEDICINE

## 2024-12-07 PROCEDURE — 2700000000 HC OXYGEN THERAPY PER DAY

## 2024-12-07 PROCEDURE — 6370000000 HC RX 637 (ALT 250 FOR IP)

## 2024-12-07 PROCEDURE — 6360000002 HC RX W HCPCS: Performed by: STUDENT IN AN ORGANIZED HEALTH CARE EDUCATION/TRAINING PROGRAM

## 2024-12-07 PROCEDURE — 83735 ASSAY OF MAGNESIUM: CPT

## 2024-12-07 PROCEDURE — 36600 WITHDRAWAL OF ARTERIAL BLOOD: CPT

## 2024-12-07 PROCEDURE — 74018 RADEX ABDOMEN 1 VIEW: CPT

## 2024-12-07 PROCEDURE — 36415 COLL VENOUS BLD VENIPUNCTURE: CPT

## 2024-12-07 PROCEDURE — 82803 BLOOD GASES ANY COMBINATION: CPT

## 2024-12-07 PROCEDURE — 94645 CONT INHLJ TX EACH ADDL HOUR: CPT

## 2024-12-07 PROCEDURE — 85027 COMPLETE CBC AUTOMATED: CPT

## 2024-12-07 PROCEDURE — 94640 AIRWAY INHALATION TREATMENT: CPT

## 2024-12-07 PROCEDURE — 2580000003 HC RX 258: Performed by: STUDENT IN AN ORGANIZED HEALTH CARE EDUCATION/TRAINING PROGRAM

## 2024-12-07 PROCEDURE — 94002 VENT MGMT INPAT INIT DAY: CPT

## 2024-12-07 PROCEDURE — 6360000002 HC RX W HCPCS: Performed by: NURSE PRACTITIONER

## 2024-12-07 PROCEDURE — 6370000000 HC RX 637 (ALT 250 FOR IP): Performed by: NURSE PRACTITIONER

## 2024-12-07 PROCEDURE — 94760 N-INVAS EAR/PLS OXIMETRY 1: CPT

## 2024-12-07 PROCEDURE — 80053 COMPREHEN METABOLIC PANEL: CPT

## 2024-12-07 PROCEDURE — 84100 ASSAY OF PHOSPHORUS: CPT

## 2024-12-07 PROCEDURE — 6370000000 HC RX 637 (ALT 250 FOR IP): Performed by: STUDENT IN AN ORGANIZED HEALTH CARE EDUCATION/TRAINING PROGRAM

## 2024-12-07 PROCEDURE — 2580000003 HC RX 258: Performed by: NURSE PRACTITIONER

## 2024-12-07 PROCEDURE — 0BH17EZ INSERTION OF ENDOTRACHEAL AIRWAY INTO TRACHEA, VIA NATURAL OR ARTIFICIAL OPENING: ICD-10-PCS | Performed by: STUDENT IN AN ORGANIZED HEALTH CARE EDUCATION/TRAINING PROGRAM

## 2024-12-07 PROCEDURE — 5A1945Z RESPIRATORY VENTILATION, 24-96 CONSECUTIVE HOURS: ICD-10-PCS | Performed by: STUDENT IN AN ORGANIZED HEALTH CARE EDUCATION/TRAINING PROGRAM

## 2024-12-07 PROCEDURE — 2000000000 HC ICU R&B

## 2024-12-07 PROCEDURE — 94660 CPAP INITIATION&MGMT: CPT

## 2024-12-07 PROCEDURE — 2500000003 HC RX 250 WO HCPCS: Performed by: STUDENT IN AN ORGANIZED HEALTH CARE EDUCATION/TRAINING PROGRAM

## 2024-12-07 PROCEDURE — 71045 X-RAY EXAM CHEST 1 VIEW: CPT

## 2024-12-07 PROCEDURE — 82330 ASSAY OF CALCIUM: CPT

## 2024-12-07 PROCEDURE — 82550 ASSAY OF CK (CPK): CPT

## 2024-12-07 RX ORDER — HYDRALAZINE HYDROCHLORIDE 20 MG/ML
20 INJECTION INTRAMUSCULAR; INTRAVENOUS EVERY 4 HOURS PRN
Status: DISCONTINUED | OUTPATIENT
Start: 2024-12-07 | End: 2024-12-11

## 2024-12-07 RX ORDER — SODIUM CHLORIDE, SODIUM LACTATE, POTASSIUM CHLORIDE, CALCIUM CHLORIDE 600; 310; 30; 20 MG/100ML; MG/100ML; MG/100ML; MG/100ML
INJECTION, SOLUTION INTRAVENOUS CONTINUOUS
Status: DISCONTINUED | OUTPATIENT
Start: 2024-12-07 | End: 2024-12-08

## 2024-12-07 RX ORDER — HYDRALAZINE HYDROCHLORIDE 20 MG/ML
10 INJECTION INTRAMUSCULAR; INTRAVENOUS ONCE
Status: COMPLETED | OUTPATIENT
Start: 2024-12-07 | End: 2024-12-07

## 2024-12-07 RX ORDER — AMLODIPINE BESYLATE 5 MG/1
10 TABLET ORAL DAILY
Status: DISCONTINUED | OUTPATIENT
Start: 2024-12-07 | End: 2024-12-14 | Stop reason: HOSPADM

## 2024-12-07 RX ORDER — IPRATROPIUM BROMIDE AND ALBUTEROL SULFATE 2.5; .5 MG/3ML; MG/3ML
1 SOLUTION RESPIRATORY (INHALATION)
Status: DISCONTINUED | OUTPATIENT
Start: 2024-12-07 | End: 2024-12-09

## 2024-12-07 RX ORDER — BUDESONIDE 0.5 MG/2ML
1 INHALANT ORAL
Status: DISCONTINUED | OUTPATIENT
Start: 2024-12-07 | End: 2024-12-09

## 2024-12-07 RX ORDER — BUPRENORPHINE 2 MG/1
4 TABLET SUBLINGUAL PRN
Status: DISCONTINUED | OUTPATIENT
Start: 2024-12-07 | End: 2024-12-07

## 2024-12-07 RX ORDER — MAGNESIUM SULFATE IN WATER 40 MG/ML
2000 INJECTION, SOLUTION INTRAVENOUS ONCE
Status: COMPLETED | OUTPATIENT
Start: 2024-12-07 | End: 2024-12-07

## 2024-12-07 RX ORDER — ETOMIDATE 2 MG/ML
INJECTION INTRAVENOUS
Status: COMPLETED
Start: 2024-12-07 | End: 2024-12-07

## 2024-12-07 RX ORDER — ONDANSETRON 4 MG/1
4 TABLET, ORALLY DISINTEGRATING ORAL EVERY 8 HOURS PRN
Status: DISCONTINUED | OUTPATIENT
Start: 2024-12-07 | End: 2024-12-14 | Stop reason: HOSPADM

## 2024-12-07 RX ORDER — HYDROMORPHONE HYDROCHLORIDE 1 MG/ML
0.5 INJECTION, SOLUTION INTRAMUSCULAR; INTRAVENOUS; SUBCUTANEOUS ONCE
Status: COMPLETED | OUTPATIENT
Start: 2024-12-07 | End: 2024-12-07

## 2024-12-07 RX ORDER — PROPOFOL 10 MG/ML
5-50 INJECTION, EMULSION INTRAVENOUS CONTINUOUS
Status: DISCONTINUED | OUTPATIENT
Start: 2024-12-07 | End: 2024-12-10

## 2024-12-07 RX ORDER — ROCURONIUM BROMIDE 10 MG/ML
INJECTION, SOLUTION INTRAVENOUS
Status: COMPLETED
Start: 2024-12-07 | End: 2024-12-07

## 2024-12-07 RX ORDER — BUDESONIDE 0.5 MG/2ML
0.5 INHALANT ORAL
Status: DISCONTINUED | OUTPATIENT
Start: 2024-12-07 | End: 2024-12-07

## 2024-12-07 RX ORDER — LABETALOL HYDROCHLORIDE 5 MG/ML
15 INJECTION, SOLUTION INTRAVENOUS EVERY 4 HOURS PRN
Status: DISCONTINUED | OUTPATIENT
Start: 2024-12-07 | End: 2024-12-07

## 2024-12-07 RX ORDER — AMLODIPINE BESYLATE 5 MG/1
10 TABLET ORAL
Status: DISCONTINUED | OUTPATIENT
Start: 2024-12-07 | End: 2024-12-07

## 2024-12-07 RX ORDER — MAGNESIUM SULFATE IN WATER 40 MG/ML
2000 INJECTION, SOLUTION INTRAVENOUS
Status: DISCONTINUED | OUTPATIENT
Start: 2024-12-07 | End: 2024-12-07 | Stop reason: HOSPADM

## 2024-12-07 RX ORDER — LOSARTAN POTASSIUM 25 MG/1
25 TABLET ORAL ONCE
Status: DISCONTINUED | OUTPATIENT
Start: 2024-12-07 | End: 2024-12-07

## 2024-12-07 RX ORDER — ACETAMINOPHEN 325 MG/1
650 TABLET ORAL EVERY 6 HOURS PRN
Status: DISCONTINUED | OUTPATIENT
Start: 2024-12-07 | End: 2024-12-14 | Stop reason: HOSPADM

## 2024-12-07 RX ORDER — IPRATROPIUM BROMIDE AND ALBUTEROL SULFATE 2.5; .5 MG/3ML; MG/3ML
1 SOLUTION RESPIRATORY (INHALATION)
Status: COMPLETED | OUTPATIENT
Start: 2024-12-07 | End: 2024-12-07

## 2024-12-07 RX ORDER — SODIUM CHLORIDE 9 MG/ML
INJECTION, SOLUTION INTRAVENOUS PRN
Status: DISCONTINUED | OUTPATIENT
Start: 2024-12-07 | End: 2024-12-14 | Stop reason: HOSPADM

## 2024-12-07 RX ORDER — SODIUM CHLORIDE 0.9 % (FLUSH) 0.9 %
5-40 SYRINGE (ML) INJECTION EVERY 12 HOURS SCHEDULED
Status: DISCONTINUED | OUTPATIENT
Start: 2024-12-07 | End: 2024-12-14 | Stop reason: HOSPADM

## 2024-12-07 RX ORDER — HYDROMORPHONE HYDROCHLORIDE 1 MG/ML
0.5 INJECTION, SOLUTION INTRAMUSCULAR; INTRAVENOUS; SUBCUTANEOUS ONCE
Status: DISCONTINUED | OUTPATIENT
Start: 2024-12-07 | End: 2024-12-07

## 2024-12-07 RX ORDER — HYDRALAZINE HYDROCHLORIDE 20 MG/ML
20 INJECTION INTRAMUSCULAR; INTRAVENOUS ONCE
Status: COMPLETED | OUTPATIENT
Start: 2024-12-07 | End: 2024-12-07

## 2024-12-07 RX ORDER — ONDANSETRON 2 MG/ML
4 INJECTION INTRAMUSCULAR; INTRAVENOUS EVERY 6 HOURS PRN
Status: DISCONTINUED | OUTPATIENT
Start: 2024-12-07 | End: 2024-12-14 | Stop reason: HOSPADM

## 2024-12-07 RX ORDER — ALBUTEROL SULFATE 0.83 MG/ML
15 SOLUTION RESPIRATORY (INHALATION) CONTINUOUS
Status: DISPENSED | OUTPATIENT
Start: 2024-12-07 | End: 2024-12-07

## 2024-12-07 RX ORDER — POLYETHYLENE GLYCOL 3350 17 G/17G
17 POWDER, FOR SOLUTION ORAL DAILY PRN
Status: DISCONTINUED | OUTPATIENT
Start: 2024-12-07 | End: 2024-12-14 | Stop reason: HOSPADM

## 2024-12-07 RX ORDER — ROCURONIUM BROMIDE 10 MG/ML
50 INJECTION, SOLUTION INTRAVENOUS ONCE
Status: COMPLETED | OUTPATIENT
Start: 2024-12-07 | End: 2024-12-07

## 2024-12-07 RX ORDER — ENOXAPARIN SODIUM 100 MG/ML
40 INJECTION SUBCUTANEOUS DAILY
Status: DISCONTINUED | OUTPATIENT
Start: 2024-12-07 | End: 2024-12-14 | Stop reason: HOSPADM

## 2024-12-07 RX ORDER — IPRATROPIUM BROMIDE AND ALBUTEROL SULFATE 2.5; .5 MG/3ML; MG/3ML
1 SOLUTION RESPIRATORY (INHALATION)
Status: DISCONTINUED | OUTPATIENT
Start: 2024-12-07 | End: 2024-12-07

## 2024-12-07 RX ORDER — SODIUM CHLORIDE 0.9 % (FLUSH) 0.9 %
5-40 SYRINGE (ML) INJECTION PRN
Status: DISCONTINUED | OUTPATIENT
Start: 2024-12-07 | End: 2024-12-14 | Stop reason: HOSPADM

## 2024-12-07 RX ORDER — ACETAMINOPHEN 650 MG/1
650 SUPPOSITORY RECTAL EVERY 6 HOURS PRN
Status: DISCONTINUED | OUTPATIENT
Start: 2024-12-07 | End: 2024-12-14 | Stop reason: HOSPADM

## 2024-12-07 RX ORDER — IPRATROPIUM BROMIDE AND ALBUTEROL SULFATE 2.5; .5 MG/3ML; MG/3ML
SOLUTION RESPIRATORY (INHALATION)
Status: COMPLETED
Start: 2024-12-07 | End: 2024-12-07

## 2024-12-07 RX ORDER — ETOMIDATE 2 MG/ML
20 INJECTION INTRAVENOUS ONCE
Status: COMPLETED | OUTPATIENT
Start: 2024-12-07 | End: 2024-12-07

## 2024-12-07 RX ORDER — FENTANYL CITRATE-0.9 % NACL/PF 10 MCG/ML
25-200 PLASTIC BAG, INJECTION (ML) INTRAVENOUS CONTINUOUS
Status: DISCONTINUED | OUTPATIENT
Start: 2024-12-07 | End: 2024-12-10

## 2024-12-07 RX ORDER — DEXMEDETOMIDINE HYDROCHLORIDE 4 UG/ML
.1-1.5 INJECTION, SOLUTION INTRAVENOUS CONTINUOUS
Status: DISCONTINUED | OUTPATIENT
Start: 2024-12-07 | End: 2024-12-07

## 2024-12-07 RX ORDER — MAGNESIUM SULFATE IN WATER 40 MG/ML
2000 INJECTION, SOLUTION INTRAVENOUS ONCE
Status: DISCONTINUED | OUTPATIENT
Start: 2024-12-07 | End: 2024-12-07

## 2024-12-07 RX ADMIN — IPRATROPIUM BROMIDE AND ALBUTEROL SULFATE 1 DOSE: 2.5; .5 SOLUTION RESPIRATORY (INHALATION) at 05:17

## 2024-12-07 RX ADMIN — WATER 80 MG: 1 INJECTION INTRAMUSCULAR; INTRAVENOUS; SUBCUTANEOUS at 23:17

## 2024-12-07 RX ADMIN — METHYLPREDNISOLONE SODIUM SUCCINATE 80 MG: 125 INJECTION INTRAMUSCULAR; INTRAVENOUS at 12:42

## 2024-12-07 RX ADMIN — LABETALOL HYDROCHLORIDE 15 MG: 5 INJECTION INTRAVENOUS at 12:03

## 2024-12-07 RX ADMIN — PROPOFOL 20 MCG/KG/MIN: 10 INJECTION, EMULSION INTRAVENOUS at 16:19

## 2024-12-07 RX ADMIN — SODIUM CHLORIDE, POTASSIUM CHLORIDE, SODIUM LACTATE AND CALCIUM CHLORIDE: 600; 310; 30; 20 INJECTION, SOLUTION INTRAVENOUS at 17:24

## 2024-12-07 RX ADMIN — HYDRALAZINE HYDROCHLORIDE 10 MG: 20 INJECTION, SOLUTION INTRAMUSCULAR; INTRAVENOUS at 05:18

## 2024-12-07 RX ADMIN — HYDRALAZINE HYDROCHLORIDE 20 MG: 20 INJECTION INTRAMUSCULAR; INTRAVENOUS at 12:44

## 2024-12-07 RX ADMIN — FENTANYL CITRATE 100 MCG/HR: 50 INJECTION INTRAVENOUS at 17:05

## 2024-12-07 RX ADMIN — ARFORMOTEROL TARTRATE: 15 SOLUTION RESPIRATORY (INHALATION) at 13:11

## 2024-12-07 RX ADMIN — HYDRALAZINE HYDROCHLORIDE 20 MG: 20 INJECTION INTRAMUSCULAR; INTRAVENOUS at 17:21

## 2024-12-07 RX ADMIN — ROCURONIUM BROMIDE 50 MG: 10 INJECTION, SOLUTION INTRAVENOUS at 16:19

## 2024-12-07 RX ADMIN — ENOXAPARIN SODIUM 40 MG: 100 INJECTION SUBCUTANEOUS at 12:45

## 2024-12-07 RX ADMIN — DEXMEDETOMIDINE HYDROCHLORIDE 0.2 MCG/KG/HR: 400 INJECTION, SOLUTION INTRAVENOUS at 13:28

## 2024-12-07 RX ADMIN — IPRATROPIUM BROMIDE AND ALBUTEROL SULFATE 1 DOSE: .5; 3 SOLUTION RESPIRATORY (INHALATION) at 13:11

## 2024-12-07 RX ADMIN — AZITHROMYCIN MONOHYDRATE 500 MG: 500 INJECTION, POWDER, LYOPHILIZED, FOR SOLUTION INTRAVENOUS at 13:39

## 2024-12-07 RX ADMIN — ETOMIDATE 20 MG: 2 INJECTION INTRAVENOUS at 16:16

## 2024-12-07 RX ADMIN — BUPRENORPHINE HCL 4 MG: 2 TABLET SUBLINGUAL at 14:22

## 2024-12-07 RX ADMIN — BUDESONIDE 1000 MCG: 0.5 INHALANT RESPIRATORY (INHALATION) at 19:41

## 2024-12-07 RX ADMIN — IPRATROPIUM BROMIDE AND ALBUTEROL SULFATE 1 DOSE: .5; 3 SOLUTION RESPIRATORY (INHALATION) at 05:17

## 2024-12-07 RX ADMIN — FENTANYL CITRATE 100 MCG/HR: 50 INJECTION INTRAVENOUS at 23:50

## 2024-12-07 RX ADMIN — ETOMIDATE 20 MG: 2 INJECTION, SOLUTION INTRAVENOUS at 16:16

## 2024-12-07 RX ADMIN — IPRATROPIUM BROMIDE AND ALBUTEROL SULFATE 1 DOSE: .5; 3 SOLUTION RESPIRATORY (INHALATION) at 19:41

## 2024-12-07 RX ADMIN — ALBUTEROL SULFATE 15 MG: 2.5 SOLUTION RESPIRATORY (INHALATION) at 01:00

## 2024-12-07 RX ADMIN — HYDRALAZINE HYDROCHLORIDE 20 MG: 20 INJECTION INTRAMUSCULAR; INTRAVENOUS at 13:44

## 2024-12-07 RX ADMIN — PROPOFOL 50 MCG/KG/MIN: 10 INJECTION, EMULSION INTRAVENOUS at 20:30

## 2024-12-07 RX ADMIN — MAGNESIUM SULFATE HEPTAHYDRATE 2000 MG: 40 INJECTION, SOLUTION INTRAVENOUS at 15:03

## 2024-12-07 RX ADMIN — WATER 1000 MG: 1 INJECTION INTRAMUSCULAR; INTRAVENOUS; SUBCUTANEOUS at 14:59

## 2024-12-07 RX ADMIN — Medication 5 MG/HR: at 15:23

## 2024-12-07 RX ADMIN — HYDROMORPHONE HYDROCHLORIDE 0.5 MG: 1 INJECTION, SOLUTION INTRAMUSCULAR; INTRAVENOUS; SUBCUTANEOUS at 13:33

## 2024-12-07 ASSESSMENT — ENCOUNTER SYMPTOMS
RHINORRHEA: 0
EYE PAIN: 0
SORE THROAT: 0
COUGH: 1
WHEEZING: 1
SHORTNESS OF BREATH: 1
VOMITING: 0
NAUSEA: 0
DIARRHEA: 0
ABDOMINAL PAIN: 0

## 2024-12-07 ASSESSMENT — PULMONARY FUNCTION TESTS
PIF_VALUE: 30
PIF_VALUE: 28

## 2024-12-07 ASSESSMENT — PAIN SCALES - GENERAL: PAINLEVEL_OUTOF10: 0

## 2024-12-07 NOTE — RT PROTOCOL NOTE
ABG  results on bipap 20/10 60%     pH 7.23  pCO2 86.2  pO2 57.5  HCO3 36.4  BE  2.9  cSO2 81.9%      Pt tolerating well

## 2024-12-07 NOTE — ED NOTES
Emergency Department Nursing Plan of Care      The Nursing Plan of Care is developed from the Nursing assessment and Emergency Department Attending provider initial evaluation.  The plan of care may be reviewed in the “ED Provider note”.    The Plan of Care was developed with the following considerations:  Patient / Family readiness to learn indicated by:verbalized understanding  Persons(s) to be included in education: patient  Barriers to Learning/Limitations:None    Signed    Johan Fong RN    12/7/2024   12:35 AM

## 2024-12-07 NOTE — ED NOTES
TRANSFER - OUT REPORT:    Verbal report given to Mary CRYSTAL on Jose Servin  being transferred to [facility] Room 402 for routine progression of patient care       Report consisted of patient's Situation, Background, Assessment and   Recommendations(SBAR).     Information from the following report(s) Nurse Handoff Report, Index, ED Encounter Summary, ED SBAR, Intake/Output, MAR, Neuro Assessment, and Event Log was reviewed with the receiving nurse.    Kinder Fall Assessment:                           Lines:   Peripheral IV 12/06/24 Left Cephalic (Active)       Peripheral IV 12/07/24 Right Antecubital (Active)   Site Assessment Clean, dry & intact 12/07/24 0026   Line Status Blood return noted 12/07/24 0026   Line Care Cap changed 12/07/24 0026   Phlebitis Assessment No symptoms 12/07/24 0026   Infiltration Assessment 0 12/07/24 0026   Alcohol Cap Used Yes 12/07/24 0026        Opportunity for questions and clarification was provided.      Patient transported with:  JEET

## 2024-12-07 NOTE — PROGRESS NOTES
1615: MD Omar at bedside; decision made to intubate.    1616: 20mg Etomidate given.     1617: 50mg Rocuronium given.     1619: Pt intubated by MD Omar; 7.5 ETT 23 @ lip.

## 2024-12-07 NOTE — PROCEDURES
PROCEDURE NOTE  Date: 12/7/2024   Name: Jose Servin  YOB: 1960    Intubation    Date/Time: 12/7/2024 5:04 PM    Performed by: Alberto Nelson MD  Authorized by: Alberto Nelson MD  Consent: The procedure was performed in an emergent situation. Verbal consent obtained.  Consent given by: patient  Required items: required blood products, implants, devices, and special equipment available  Patient identity confirmed: arm band and hospital-assigned identification number  Time out: Immediately prior to procedure a \"time out\" was called to verify the correct patient, procedure, equipment, support staff and site/side marked as required.  Indications: respiratory distress and hypercapnia  Intubation method: video-assisted  Preoxygenation: BVM  Sedatives: etomidate  Paralytic: rocuronium  Tube size: 7.5 mm  Number of attempts: 1  Post-procedure assessment: chest rise and ETCO2 monitor  Breath sounds: equal  Chest x-ray findings: endotracheal tube in appropriate position  Patient tolerance: patient tolerated the procedure well with no immediate complications  Comments: Emergent intubation for progressive respiratory distress and WOB. Patient tolerated well with no immediate complication.

## 2024-12-07 NOTE — PROGRESS NOTES
1319: Pt arrived to unit on bipap, Dr. Nelson at bedside for admission assessment.   1328: Precedex gtt started for RASS +2.  1333: Dilaudid administered for RASS +2  1345: Pt with feelings of impending doom, pt states \"I'm going to die.\" Dr. Nelson notified, at bedside to further evaluate. Hydralazine administered for elevated DBP.

## 2024-12-07 NOTE — ED PROVIDER NOTES
EMERGENCY DEPARTMENT HISTORY AND PHYSICAL EXAM            Please note that this dictation was completed with the assistance of \"Dragon\", the computer voice recognition software. Quite often unanticipated grammatical, syntax, homophones, and other interpretive errors are inadvertently transcribed by the computer software. Please disregard these errors and any errors that have escaped final proofreading. Thank you.    Date of Evaluation: 12/07/24  Patient: Jose Servin  Patient Age and Sex: 64 y.o. male   MRN: [id number]  CSN: [id number]  PCP: Sylvia Orozco APRN - BERTHA    History of Present Illness     Chief Complaint   Patient presents with    Shortness of Breath     History Provided By: Patient/family/EMS (if available)    History is limited by: Nothing     HPI: Jose Servin, 64 y.o. male with past medical history as documented below presents to the ED with c/o of acute onset of shortness of breath and respiratory distress onset since yesterday.  Does a history of COPD.  States that he wears 2 L of oxygen intermittently at nighttime.  States that he had a recent URI which caused current episode.  Patient continues smoke cigarettes.  Denies any associated chest pain, patient was found to have saturations 36% on room air.  Patient states that his machine stopped working yesterday.. Pt denies any other exacerbating or ameliorating factors. There are no other complaints, changes or physical findings pertinent to the HPI at this time.    Nursing notes were all reviewed and agreed with or any disagreements were addressed in the HPI.    Past History   Past Medical History:  Past Medical History:   Diagnosis Date    Hypertension     Respiratory failure with hypoxia (HCC) 2/20/2023       Past Surgical History:  Past Surgical History:   Procedure Laterality Date    COLONOSCOPY N/A 10/27/2020    COLONOSCOPY performed by Hugo Hernandez MD at [facility] MAIN OR    HEENT      HERNIA REPAIR  8/2010    NONEMERG TRANSP  Places Lived in the Last Year: Not on file     Unstable Housing in the Last Year: No   Utilities: Not on file     Social History     Tobacco Use    Smoking status: Every Day     Current packs/day: 1.00     Types: Cigarettes    Smokeless tobacco: Never   Substance Use Topics    Alcohol use: No    Drug use: No     TOBACCO COUNSELING:  Upon evaluation, pt expressed that they are a current tobacco user. For approximately 5 mins, pt has been counseled on the dangers of smoking and was encouraged to quit as soon as possible in order to decrease further risks to their health. Pt has conveyed their understanding of the risks involved should they continue to use tobacco products.    ED Course: Progress Notes, Reevaluation, and Consults:  Initial assessment performed. I discussed presenting problems and concerns, and my formulated plan for today's visit with the patient and any available family members. I have encouraged them to ask questions as they arise throughout the visit.     ED Physician Orders:   Orders Placed This Encounter   Procedures    COVID-19 & Influenza Combo    XR CHEST PORTABLE    CBC with Auto Differential    Troponin    pH, venous    Extra Tubes Hold    Blood Gas, Venous    Lipase    Magnesium    Comprehensive Metabolic Panel    Brain Natriuretic Peptide    pH, venous    Arterial Blood Gas with ICA    Diet NPO    CARDIAC/RESPIRATORY MONITORING    Check temperature    Vital signs    Telemetry monitoring - 72 hour duration    Notify physician    Baseline assessment of patient on Non-Invasive Positive Pressure Ventilation (NIPPV)    Reassessment of patient on Non-Invasive Positive Pressure Ventilation (NIPPV)    Educate Patient & Family on Non-Invasive Positive Pressure Ventilation (NIPPV)    Oral care    Elevate head of bed (HOB)    Inpatient consult to Hospitalist    Adult NIV/Positive Airway Pressure    Pulse oximetry, continuous    RT--ABG WITH ELECTROLYTES    RT--ABG WITH ELECTROLYTES    EKG 12 Lead

## 2024-12-07 NOTE — H&P
0043:pH 7.12 pCO2 102.6 pO2 50   - ABG 0630: pH 7.23  pCO2 86.2 pO2 57.5 HCO3 36.4 BE  2.9 pSO2 81.9  - ABG 1237 pH 7.22 pCO2 86.6 pO2 168 HCO3 35.7  sat 99%  - reordered steroids, nebs  - add azithromycin  - repeat ABG in 1 hour, if not improved - consult intensivist    1245  - pt with increased agitation and work of breathing, even on BiPAP - reports getting tired  - intensivist consult, transfer to ICU    Hx Htn  - ran out of meds ~ 1 week ago per pt  - restarted norvasc f able to take po; also with prn hydralazine    Heroin Use  - last used 12/6  - high risk for withdrawal    Tobacco Use  - current smoker    DIET: ADULT DIET; Regular   ISOLATION PRECAUTIONS: No active isolations  CODE STATUS: Full Code   Central Line:   none  DVT PROPHYLAXIS: Lovenox  FUNCTIONAL STATUS PRIOR TO HOSPITALIZATION: Fully active and ambulatory; able to carry on all self-care without restriction.  Ambulatory status/function: By self   EARLY MOBILITY ASSESSMENT: Recommend routine ambulation while hospitalized with the assistance of nursing staff  ANTICIPATED DISCHARGE: Greater than 48 hours.  ANTICIPATED DISPOSITION: Home  EMERGENCY CONTACT/SURROGATE DECISION MAKER:     CRITICAL CARE WAS PERFORMED FOR THIS ENCOUNTER: YES. I had a face to face encounter with the patient, reviewed and interpreted patient data including clinical events, labs, images, vital signs, I/O's, and examined patient.  I have discussed the case and the plan and management of the patient's care with the consulting services, the bedside nurses and necessary ancillary providers.  This patient has a high probability of imminent, clinically significant deterioration, which requires the highest level of preparedness to intervene urgently. I participated in the decision-making and personally managed or directed the management of the following life and organ supporting interventions that required my frequent assessment to treat or prevent imminent deterioration.  I  personally spent >40 minutes of critical care time.  This is time spent at this critically ill patient's bedside actively involved in patient care as well as the coordination of care and discussions with the patient's family.  This does not include any procedural time which has been billed separately.    Signed By: DUSTY Christensen NP     December 7, 2024       Please note that this dictation may have been completed with Dragon, the computer voice recognition software.  Quite often unanticipated grammatical, syntax, homophones, and other interpretive errors are inadvertently transcribed by the computer software.  Please disregard these errors.  Please excuse any errors that have escaped final proofreading.

## 2024-12-07 NOTE — ED TRIAGE NOTES
Hx COPD - pt c/o difficulty breathing since yesterday. Pt reports he started having a cold with a lot of mucus. Sp02 36% on RA

## 2024-12-07 NOTE — CONSULTS
Tobacco Use    Smoking status: Every Day     Current packs/day: 1.00     Types: Cigarettes    Smokeless tobacco: Never   Substance Use Topics    Alcohol use: No      No family history on file.      OBJECTIVE:     Blood pressure (!) 182/138, pulse 86, temperature 97.5 °F (36.4 °C), temperature source Axillary, resp. rate 22, SpO2 100%.  Physical Exam  Gen: ill appearing   HEENT: NC/AT, supple  Cardiac: RRR, no M/R/G  Pulm: diminished air movement bilaterally   ABD: S/NT/ND, normal bowel sounds  Extremities: no edema  Skin: no rash  Neuro: A/O X 3, no focal deficits     I have personally reviewed and interpreted all relevant imaging and laboratory data.     CRITICAL CARE DOCUMENTATION  I had a face to face encounter with the patient, reviewed and interpreted patient data including clinical events, labs, images, vital signs, I/O's, and examined patient.  I have discussed the case and the plan and management of the patient's care with the consulting services, the bedside nurses and the respiratory therapist.      NOTE OF PERSONAL INVOLVEMENT IN CARE   This patient has a high probability of imminent, clinically significant deterioration, which requires the highest level of preparedness to intervene urgently. I participated in the decision-making and personally managed or directed the management of the following life and organ supporting interventions that required my frequent assessment to treat or prevent imminent deterioration.    I personally spent 45 minutes of critical care time.  This is time spent at this critically ill patient's bedside actively involved in patient care as well as the coordination of care.  This does not include any procedural time which has been billed separately.    Alberto Nelson M.D.  [facility]  12/7/2024

## 2024-12-07 NOTE — ED NOTES
Pt informed on his lab work and updated on Plan of Care.  Pt has asked staffing to think things over on his decisions for the plan of care.  MD informed pt we will adjust BiPAP settings and reassess.  Pt was asked if he would like for us to call any family members or the spouse listed in his chart, pt was adamant that he does not want anyone called.

## 2024-12-07 NOTE — ED NOTES
Pt lethargic on arrival, Spo2 36% on RA on arrival.  Pt walked in to ER.    Pt has cold extremities  Pt oral mucosa dry and cracked   Pt in tripod position on arrival

## 2024-12-07 NOTE — PROGRESS NOTES
0620 - Assumed care of the patient from [facility]    0630 - Due to the registration of the patient not being completed, proceeded with performing the initial shift assessment.  Pt A&O x 4, blood pressure was high 142/102(114), rates pain 0/10, currently on BiPAP, there were no orders to be reviewed, patient put on telemetry monitoring, PIV site(s) intact and saline locked, whiteboard updated with POC for the shift, unable to provide education on any medications as orders had not been entered by the admitting provider, there were no scheduled medications for the shift, all precautions and safety interventions in place. Verified bed alarm on/audible, patient oriented to room/call system, and was encouraged to use the call light for any and all assistance.     Contacted attending at 0725 due to patient's /122 (137). Waiting on orders.     Plan of care ongoing.

## 2024-12-07 NOTE — PROGRESS NOTES
ABG result on BIPAP, 12/6, 45%:    pH                 7.10  pCO2             106  pO2                83  HCO3              32  BE                 -2.2  SPO2               91%    RR                   22

## 2024-12-08 ENCOUNTER — APPOINTMENT (OUTPATIENT)
Facility: HOSPITAL | Age: 64
End: 2024-12-08
Attending: FAMILY MEDICINE
Payer: COMMERCIAL

## 2024-12-08 LAB
ALBUMIN SERPL-MCNC: 2.5 G/DL (ref 3.5–5)
ALBUMIN/GLOB SERPL: 0.6 (ref 1.1–2.2)
ALP SERPL-CCNC: 122 U/L (ref 45–117)
ALT SERPL-CCNC: 45 U/L (ref 12–78)
ANION GAP SERPL CALC-SCNC: 5 MMOL/L (ref 2–12)
ARTERIAL PATENCY WRIST A: POSITIVE
AST SERPL-CCNC: 21 U/L (ref 15–37)
B PERT DNA SPEC QL NAA+PROBE: NOT DETECTED
BASE EXCESS BLD CALC-SCNC: 7.7 MMOL/L
BASOPHILS # BLD: 0 K/UL (ref 0–0.1)
BASOPHILS NFR BLD: 0 % (ref 0–1)
BDY SITE: ABNORMAL
BILIRUB DIRECT SERPL-MCNC: 0.2 MG/DL (ref 0–0.2)
BILIRUB SERPL-MCNC: 0.4 MG/DL (ref 0.2–1)
BORDETELLA PARAPERTUSSIS BY PCR: NOT DETECTED
BUN SERPL-MCNC: 42 MG/DL (ref 6–20)
BUN/CREAT SERPL: 23 (ref 12–20)
C PNEUM DNA SPEC QL NAA+PROBE: NOT DETECTED
CA-I BLD-SCNC: 1.26 MMOL/L (ref 1.12–1.32)
CALCIUM SERPL-MCNC: 9.4 MG/DL (ref 8.5–10.1)
CHLORIDE SERPL-SCNC: 96 MMOL/L (ref 97–108)
CO2 SERPL-SCNC: 36 MMOL/L (ref 21–32)
CREAT SERPL-MCNC: 1.81 MG/DL (ref 0.7–1.3)
DIFFERENTIAL METHOD BLD: ABNORMAL
EOSINOPHIL # BLD: 0 K/UL (ref 0–0.4)
EOSINOPHIL NFR BLD: 0 % (ref 0–7)
ERYTHROCYTE [DISTWIDTH] IN BLOOD BY AUTOMATED COUNT: 14 % (ref 11.5–14.5)
FLUAV SUBTYP SPEC NAA+PROBE: NOT DETECTED
FLUBV RNA SPEC QL NAA+PROBE: NOT DETECTED
GAS FLOW.O2 O2 DELIVERY SYS: ABNORMAL
GAS FLOW.O2 SETTING OXYMISER: 16 BPM
GLOBULIN SER CALC-MCNC: 3.9 G/DL (ref 2–4)
GLUCOSE SERPL-MCNC: 146 MG/DL (ref 65–100)
HADV DNA SPEC QL NAA+PROBE: NOT DETECTED
HCO3 BLD-SCNC: 36.9 MMOL/L (ref 21–28)
HCOV 229E RNA SPEC QL NAA+PROBE: NOT DETECTED
HCOV HKU1 RNA SPEC QL NAA+PROBE: NOT DETECTED
HCOV NL63 RNA SPEC QL NAA+PROBE: NOT DETECTED
HCOV OC43 RNA SPEC QL NAA+PROBE: NOT DETECTED
HCT VFR BLD AUTO: 56.4 % (ref 36.6–50.3)
HGB BLD-MCNC: 16.9 G/DL (ref 12.1–17)
HMPV RNA SPEC QL NAA+PROBE: NOT DETECTED
HPIV1 RNA SPEC QL NAA+PROBE: NOT DETECTED
HPIV2 RNA SPEC QL NAA+PROBE: NOT DETECTED
HPIV3 RNA SPEC QL NAA+PROBE: NOT DETECTED
HPIV4 RNA SPEC QL NAA+PROBE: NOT DETECTED
IMM GRANULOCYTES # BLD AUTO: 0 K/UL (ref 0–0.04)
IMM GRANULOCYTES NFR BLD AUTO: 0 % (ref 0–0.5)
INR PPP: 1.2 (ref 0.9–1.1)
LYMPHOCYTES # BLD: 0.3 K/UL (ref 0.8–3.5)
LYMPHOCYTES NFR BLD: 2 % (ref 12–49)
M PNEUMO DNA SPEC QL NAA+PROBE: NOT DETECTED
MAGNESIUM SERPL-MCNC: 2.6 MG/DL (ref 1.6–2.4)
MCH RBC QN AUTO: 27.1 PG (ref 26–34)
MCHC RBC AUTO-ENTMCNC: 30 G/DL (ref 30–36.5)
MCV RBC AUTO: 90.5 FL (ref 80–99)
MONOCYTES # BLD: 0.5 K/UL (ref 0–1)
MONOCYTES NFR BLD: 4 % (ref 5–13)
NEUTS SEG # BLD: 11.7 K/UL (ref 1.8–8)
NEUTS SEG NFR BLD: 94 % (ref 32–75)
NRBC # BLD: 0 K/UL (ref 0–0.01)
NRBC BLD-RTO: 0 PER 100 WBC
O2/TOTAL GAS SETTING VFR VENT: 50 %
PCO2 BLD: 63.9 MMHG (ref 35–48)
PEEP RESPIRATORY: 8 CMH2O
PH BLD: 7.37 (ref 7.35–7.45)
PHOSPHATE SERPL-MCNC: 2.7 MG/DL (ref 2.6–4.7)
PLATELET # BLD AUTO: 121 K/UL (ref 150–400)
PMV BLD AUTO: 11.5 FL (ref 8.9–12.9)
PO2 BLD: 100 MMHG (ref 83–108)
POTASSIUM SERPL-SCNC: 4.3 MMOL/L (ref 3.5–5.1)
PROT SERPL-MCNC: 6.4 G/DL (ref 6.4–8.2)
PROTHROMBIN TIME: 12.4 SEC (ref 9–11.1)
RBC # BLD AUTO: 6.23 M/UL (ref 4.1–5.7)
RBC MORPH BLD: ABNORMAL
RSV RNA SPEC QL NAA+PROBE: NOT DETECTED
RV+EV RNA SPEC QL NAA+PROBE: DETECTED
SAO2 % BLD: 97.2 % (ref 92–97)
SARS-COV-2 RNA RESP QL NAA+PROBE: NOT DETECTED
SODIUM SERPL-SCNC: 137 MMOL/L (ref 136–145)
SPECIMEN TYPE: ABNORMAL
VENTILATION MODE VENT: ABNORMAL
VT SETTING VENT: 450 ML
WBC # BLD AUTO: 12.5 K/UL (ref 4.1–11.1)

## 2024-12-08 PROCEDURE — 2580000003 HC RX 258: Performed by: STUDENT IN AN ORGANIZED HEALTH CARE EDUCATION/TRAINING PROGRAM

## 2024-12-08 PROCEDURE — 85025 COMPLETE CBC W/AUTO DIFF WBC: CPT

## 2024-12-08 PROCEDURE — 94640 AIRWAY INHALATION TREATMENT: CPT

## 2024-12-08 PROCEDURE — 36600 WITHDRAWAL OF ARTERIAL BLOOD: CPT

## 2024-12-08 PROCEDURE — 71045 X-RAY EXAM CHEST 1 VIEW: CPT

## 2024-12-08 PROCEDURE — 84100 ASSAY OF PHOSPHORUS: CPT

## 2024-12-08 PROCEDURE — 6360000002 HC RX W HCPCS: Performed by: STUDENT IN AN ORGANIZED HEALTH CARE EDUCATION/TRAINING PROGRAM

## 2024-12-08 PROCEDURE — 80076 HEPATIC FUNCTION PANEL: CPT

## 2024-12-08 PROCEDURE — 82803 BLOOD GASES ANY COMBINATION: CPT

## 2024-12-08 PROCEDURE — 85610 PROTHROMBIN TIME: CPT

## 2024-12-08 PROCEDURE — 83735 ASSAY OF MAGNESIUM: CPT

## 2024-12-08 PROCEDURE — 6370000000 HC RX 637 (ALT 250 FOR IP): Performed by: NURSE PRACTITIONER

## 2024-12-08 PROCEDURE — 2580000003 HC RX 258: Performed by: NURSE PRACTITIONER

## 2024-12-08 PROCEDURE — 80048 BASIC METABOLIC PNL TOTAL CA: CPT

## 2024-12-08 PROCEDURE — 6370000000 HC RX 637 (ALT 250 FOR IP): Performed by: STUDENT IN AN ORGANIZED HEALTH CARE EDUCATION/TRAINING PROGRAM

## 2024-12-08 PROCEDURE — 2000000000 HC ICU R&B

## 2024-12-08 PROCEDURE — 94003 VENT MGMT INPAT SUBQ DAY: CPT

## 2024-12-08 PROCEDURE — 6360000002 HC RX W HCPCS: Performed by: NURSE PRACTITIONER

## 2024-12-08 PROCEDURE — 36415 COLL VENOUS BLD VENIPUNCTURE: CPT

## 2024-12-08 PROCEDURE — 51702 INSERT TEMP BLADDER CATH: CPT

## 2024-12-08 PROCEDURE — 0202U NFCT DS 22 TRGT SARS-COV-2: CPT

## 2024-12-08 RX ORDER — SODIUM CHLORIDE 9 MG/ML
INJECTION, SOLUTION INTRAVENOUS CONTINUOUS
Status: DISCONTINUED | OUTPATIENT
Start: 2024-12-08 | End: 2024-12-08

## 2024-12-08 RX ORDER — OXYCODONE HYDROCHLORIDE 5 MG/1
10 TABLET ORAL EVERY 4 HOURS
Status: DISCONTINUED | OUTPATIENT
Start: 2024-12-08 | End: 2024-12-10

## 2024-12-08 RX ORDER — SODIUM CHLORIDE 9 MG/ML
INJECTION, SOLUTION INTRAVENOUS CONTINUOUS
Status: DISPENSED | OUTPATIENT
Start: 2024-12-08 | End: 2024-12-08

## 2024-12-08 RX ORDER — FENTANYL CITRATE 50 UG/ML
50 INJECTION, SOLUTION INTRAMUSCULAR; INTRAVENOUS ONCE
Status: COMPLETED | OUTPATIENT
Start: 2024-12-08 | End: 2024-12-08

## 2024-12-08 RX ADMIN — PROPOFOL 50 MCG/KG/MIN: 10 INJECTION, EMULSION INTRAVENOUS at 10:13

## 2024-12-08 RX ADMIN — FENTANYL CITRATE 125 MCG/HR: 50 INJECTION INTRAVENOUS at 14:29

## 2024-12-08 RX ADMIN — BUDESONIDE 1000 MCG: 0.5 INHALANT RESPIRATORY (INHALATION) at 07:15

## 2024-12-08 RX ADMIN — IPRATROPIUM BROMIDE AND ALBUTEROL SULFATE 1 DOSE: .5; 3 SOLUTION RESPIRATORY (INHALATION) at 07:15

## 2024-12-08 RX ADMIN — BUDESONIDE 1000 MCG: 0.5 INHALANT RESPIRATORY (INHALATION) at 19:58

## 2024-12-08 RX ADMIN — PROPOFOL 50 MCG/KG/MIN: 10 INJECTION, EMULSION INTRAVENOUS at 14:29

## 2024-12-08 RX ADMIN — PROPOFOL 50 MCG/KG/MIN: 10 INJECTION, EMULSION INTRAVENOUS at 01:54

## 2024-12-08 RX ADMIN — ONDANSETRON 4 MG: 2 INJECTION INTRAMUSCULAR; INTRAVENOUS at 02:23

## 2024-12-08 RX ADMIN — OXYCODONE HYDROCHLORIDE 10 MG: 5 TABLET ORAL at 22:10

## 2024-12-08 RX ADMIN — PROPOFOL 50 MCG/KG/MIN: 10 INJECTION, EMULSION INTRAVENOUS at 23:38

## 2024-12-08 RX ADMIN — PROPOFOL 50 MCG/KG/MIN: 10 INJECTION, EMULSION INTRAVENOUS at 19:21

## 2024-12-08 RX ADMIN — AMLODIPINE BESYLATE 10 MG: 5 TABLET ORAL at 08:02

## 2024-12-08 RX ADMIN — SODIUM CHLORIDE: 9 INJECTION, SOLUTION INTRAVENOUS at 10:52

## 2024-12-08 RX ADMIN — FENTANYL CITRATE 50 MCG: 50 INJECTION INTRAMUSCULAR; INTRAVENOUS at 02:24

## 2024-12-08 RX ADMIN — SODIUM CHLORIDE: 9 INJECTION, SOLUTION INTRAVENOUS at 18:32

## 2024-12-08 RX ADMIN — OXYCODONE HYDROCHLORIDE 10 MG: 5 TABLET ORAL at 14:51

## 2024-12-08 RX ADMIN — PANTOPRAZOLE SODIUM 40 MG: 40 INJECTION, POWDER, LYOPHILIZED, FOR SOLUTION INTRAVENOUS at 12:00

## 2024-12-08 RX ADMIN — ENOXAPARIN SODIUM 40 MG: 100 INJECTION SUBCUTANEOUS at 08:01

## 2024-12-08 RX ADMIN — AZITHROMYCIN MONOHYDRATE 500 MG: 500 INJECTION, POWDER, LYOPHILIZED, FOR SOLUTION INTRAVENOUS at 13:06

## 2024-12-08 RX ADMIN — SODIUM CHLORIDE, PRESERVATIVE FREE 10 ML: 5 INJECTION INTRAVENOUS at 00:07

## 2024-12-08 RX ADMIN — PROPOFOL 50 MCG/KG/MIN: 10 INJECTION, EMULSION INTRAVENOUS at 06:35

## 2024-12-08 RX ADMIN — WATER 80 MG: 1 INJECTION INTRAMUSCULAR; INTRAVENOUS; SUBCUTANEOUS at 22:10

## 2024-12-08 RX ADMIN — WATER 80 MG: 1 INJECTION INTRAMUSCULAR; INTRAVENOUS; SUBCUTANEOUS at 13:07

## 2024-12-08 RX ADMIN — OXYCODONE HYDROCHLORIDE 10 MG: 5 TABLET ORAL at 11:59

## 2024-12-08 RX ADMIN — HYDRALAZINE HYDROCHLORIDE 20 MG: 20 INJECTION INTRAMUSCULAR; INTRAVENOUS at 02:27

## 2024-12-08 RX ADMIN — WATER 1000 MG: 1 INJECTION INTRAMUSCULAR; INTRAVENOUS; SUBCUTANEOUS at 14:51

## 2024-12-08 RX ADMIN — SODIUM CHLORIDE, PRESERVATIVE FREE 10 ML: 5 INJECTION INTRAVENOUS at 22:09

## 2024-12-08 RX ADMIN — IPRATROPIUM BROMIDE AND ALBUTEROL SULFATE 1 DOSE: .5; 3 SOLUTION RESPIRATORY (INHALATION) at 19:58

## 2024-12-08 RX ADMIN — OXYCODONE HYDROCHLORIDE 10 MG: 5 TABLET ORAL at 18:32

## 2024-12-08 RX ADMIN — FENTANYL CITRATE 150 MCG/HR: 50 INJECTION INTRAVENOUS at 07:30

## 2024-12-08 RX ADMIN — HYDRALAZINE HYDROCHLORIDE 20 MG: 20 INJECTION INTRAMUSCULAR; INTRAVENOUS at 06:36

## 2024-12-08 RX ADMIN — IPRATROPIUM BROMIDE AND ALBUTEROL SULFATE 1 DOSE: .5; 3 SOLUTION RESPIRATORY (INHALATION) at 15:13

## 2024-12-08 RX ADMIN — SODIUM CHLORIDE, POTASSIUM CHLORIDE, SODIUM LACTATE AND CALCIUM CHLORIDE: 600; 310; 30; 20 INJECTION, SOLUTION INTRAVENOUS at 01:55

## 2024-12-08 RX ADMIN — WATER 80 MG: 1 INJECTION INTRAMUSCULAR; INTRAVENOUS; SUBCUTANEOUS at 06:30

## 2024-12-08 ASSESSMENT — PULMONARY FUNCTION TESTS
PIF_VALUE: 26
PIF_VALUE: 25
PIF_VALUE: 26
PIF_VALUE: 32
PIF_VALUE: 23
PIF_VALUE: 28

## 2024-12-08 ASSESSMENT — PAIN SCALES - GENERAL
PAINLEVEL_OUTOF10: 0

## 2024-12-08 NOTE — PROGRESS NOTES
CRITICAL CARE PROGRESS NOTE    Name: Jose Servin   : 1960   MRN: [id number]   Date: 2024      ICU Diagnosis    Acute Hypercarbic Respiratory Failure  Acute COPD Exacerbation  Opioid Withdrawal     Overnight Events   No events. Appears comfortable this AM.    ROS negative except as otherwise documented.     A/P   This is a 64 year old male with history of HTN, COPD (no PFTs) and substance abuse (heroin) who presented to the ED with complaint of SOB - admitted to the ICU for respiratory failure and need for intubation.     NEUROLOGICAL:    Likely opioid withdrawal. Last use one day prior. Discussed with pharmacy - will start scheduled oxycodone to facilitate fentanyl wean.   - oxycodone 10 Q4 hours  - fentanyl gtt - wean as tolerated  - addiction psych consult on Monday  - neuro checks per unit protocol      PULMONOLOGY:   Hypoxic and hypercarbic respiratory failure. CXR hyperinflated but no overt consolidation. Last LDCT reviewed - moderate centrilobular emphysema.   - lung protective ventilation; DP < 15 and plat < 30  - Q4 Duo-Nebs  - solumedrol 80 TID  - LAMA/LABA/ICS  - ABX as below     CARDIOVASCULAR:   No known cardiac history. Last TTE normal LV and RV function  - goal MAP>=65  - continue home amlodipine  - PRN hydralazine      RENAL/ELECTROLYTE:   BIRGIT. Baseline creatinine 1.1. Likely volume depletion.   - goal K>=4, Mg>=2, Phos >3  - daily BMP  - strict I/O's; daily weights  - avoid nephrotoxic medications     HEAM:   Erythrocytosis - likely due to tobacco use. DDX hemoconcentration. Less likely polycythemia vera. Improved with IVF.  - NS at 125 for 12 hours  - daily CBC  - consider smear if no improvement     ID/MICRO:   RVP positive for rhinovirus. Empiric ABX while critically ill  - continue azithromycin  - continue ceftriaxone  - follow culture data    ICU DAILY CHECKLIST     Code Status: full  DVT Prophylaxis: lovenox  T/L/D: PIV  SUP: PPI  Diet: advance as tolerated  ABCDEF

## 2024-12-09 LAB
ANION GAP SERPL CALC-SCNC: 1 MMOL/L (ref 2–12)
ARTERIAL PATENCY WRIST A: YES
BASE EXCESS BLDA CALC-SCNC: 9.8 MMOL/L
BASOPHILS # BLD: 0 K/UL (ref 0–0.1)
BASOPHILS NFR BLD: 0 % (ref 0–1)
BDY SITE: ABNORMAL
BUN SERPL-MCNC: 56 MG/DL (ref 6–20)
BUN/CREAT SERPL: 34 (ref 12–20)
CALCIUM SERPL-MCNC: 9 MG/DL (ref 8.5–10.1)
CHLORIDE SERPL-SCNC: 100 MMOL/L (ref 97–108)
CO2 SERPL-SCNC: 34 MMOL/L (ref 21–32)
CREAT SERPL-MCNC: 1.65 MG/DL (ref 0.7–1.3)
DIFFERENTIAL METHOD BLD: ABNORMAL
EKG ATRIAL RATE: 82 BPM
EKG DIAGNOSIS: NORMAL
EKG P AXIS: 88 DEGREES
EKG P-R INTERVAL: 134 MS
EKG Q-T INTERVAL: 392 MS
EKG QRS DURATION: 104 MS
EKG QTC CALCULATION (BAZETT): 457 MS
EKG R AXIS: -83 DEGREES
EKG T AXIS: 83 DEGREES
EKG VENTRICULAR RATE: 82 BPM
EOSINOPHIL # BLD: 0 K/UL (ref 0–0.4)
EOSINOPHIL NFR BLD: 0 % (ref 0–7)
ERYTHROCYTE [DISTWIDTH] IN BLOOD BY AUTOMATED COUNT: 14.2 % (ref 11.5–14.5)
FIO2 ON VENT: 40 %
GLUCOSE SERPL-MCNC: 126 MG/DL (ref 65–100)
HCO3 BLDA-SCNC: 36 MMOL/L (ref 22–26)
HCT VFR BLD AUTO: 50.1 % (ref 36.6–50.3)
HGB BLD-MCNC: 15.5 G/DL (ref 12.1–17)
IMM GRANULOCYTES # BLD AUTO: 0 K/UL
IMM GRANULOCYTES NFR BLD AUTO: 0 %
LYMPHOCYTES # BLD: 0.3 K/UL (ref 0.8–3.5)
LYMPHOCYTES NFR BLD: 4 % (ref 12–49)
MCH RBC QN AUTO: 27.4 PG (ref 26–34)
MCHC RBC AUTO-ENTMCNC: 30.9 G/DL (ref 30–36.5)
MCV RBC AUTO: 88.5 FL (ref 80–99)
MONOCYTES # BLD: 0.1 K/UL (ref 0–1)
MONOCYTES NFR BLD: 1 % (ref 5–13)
NEUTS SEG # BLD: 8.3 K/UL (ref 1.8–8)
NEUTS SEG NFR BLD: 95 % (ref 32–75)
NRBC # BLD: 0 K/UL (ref 0–0.01)
NRBC BLD-RTO: 0 PER 100 WBC
PCO2 BLDA: 54 MMHG (ref 35–45)
PEEP RESPIRATORY: 8
PH BLDA: 7.44 (ref 7.35–7.45)
PLATELET # BLD AUTO: 113 K/UL (ref 150–400)
PMV BLD AUTO: 11.8 FL (ref 8.9–12.9)
PO2 BLDA: 75 MMHG (ref 80–100)
POTASSIUM SERPL-SCNC: 4.1 MMOL/L (ref 3.5–5.1)
PRESSURE SUPPORT SETTING VENT: 5
RBC # BLD AUTO: 5.66 M/UL (ref 4.1–5.7)
RBC MORPH BLD: ABNORMAL
SAO2 % BLD: 95 % (ref 92–97)
SAO2% DEVICE SAO2% SENSOR NAME: ABNORMAL
SODIUM SERPL-SCNC: 135 MMOL/L (ref 136–145)
SPECIMEN SITE: ABNORMAL
VENTILATION MODE VENT: ABNORMAL
WBC # BLD AUTO: 8.7 K/UL (ref 4.1–11.1)

## 2024-12-09 PROCEDURE — 82803 BLOOD GASES ANY COMBINATION: CPT

## 2024-12-09 PROCEDURE — 6370000000 HC RX 637 (ALT 250 FOR IP): Performed by: STUDENT IN AN ORGANIZED HEALTH CARE EDUCATION/TRAINING PROGRAM

## 2024-12-09 PROCEDURE — 6370000000 HC RX 637 (ALT 250 FOR IP): Performed by: NURSE PRACTITIONER

## 2024-12-09 PROCEDURE — 94760 N-INVAS EAR/PLS OXIMETRY 1: CPT

## 2024-12-09 PROCEDURE — 94640 AIRWAY INHALATION TREATMENT: CPT

## 2024-12-09 PROCEDURE — 2580000003 HC RX 258: Performed by: NURSE PRACTITIONER

## 2024-12-09 PROCEDURE — 6360000002 HC RX W HCPCS: Performed by: STUDENT IN AN ORGANIZED HEALTH CARE EDUCATION/TRAINING PROGRAM

## 2024-12-09 PROCEDURE — 6370000000 HC RX 637 (ALT 250 FOR IP): Performed by: INTERNAL MEDICINE

## 2024-12-09 PROCEDURE — 2580000003 HC RX 258: Performed by: STUDENT IN AN ORGANIZED HEALTH CARE EDUCATION/TRAINING PROGRAM

## 2024-12-09 PROCEDURE — 36415 COLL VENOUS BLD VENIPUNCTURE: CPT

## 2024-12-09 PROCEDURE — 80048 BASIC METABOLIC PNL TOTAL CA: CPT

## 2024-12-09 PROCEDURE — 6360000002 HC RX W HCPCS: Performed by: NURSE PRACTITIONER

## 2024-12-09 PROCEDURE — 85025 COMPLETE CBC W/AUTO DIFF WBC: CPT

## 2024-12-09 PROCEDURE — 2000000000 HC ICU R&B

## 2024-12-09 PROCEDURE — 36600 WITHDRAWAL OF ARTERIAL BLOOD: CPT

## 2024-12-09 PROCEDURE — 94003 VENT MGMT INPAT SUBQ DAY: CPT

## 2024-12-09 PROCEDURE — 97165 OT EVAL LOW COMPLEX 30 MIN: CPT

## 2024-12-09 PROCEDURE — 2700000000 HC OXYGEN THERAPY PER DAY

## 2024-12-09 PROCEDURE — 6360000002 HC RX W HCPCS: Performed by: INTERNAL MEDICINE

## 2024-12-09 PROCEDURE — 6370000000 HC RX 637 (ALT 250 FOR IP): Performed by: PHYSICIAN ASSISTANT

## 2024-12-09 PROCEDURE — 93010 ELECTROCARDIOGRAM REPORT: CPT | Performed by: SPECIALIST

## 2024-12-09 PROCEDURE — 6360000002 HC RX W HCPCS: Performed by: PHYSICIAN ASSISTANT

## 2024-12-09 PROCEDURE — 97530 THERAPEUTIC ACTIVITIES: CPT

## 2024-12-09 RX ORDER — NYSTATIN 100000 [USP'U]/ML
5 SUSPENSION ORAL 4 TIMES DAILY
Status: DISCONTINUED | OUTPATIENT
Start: 2024-12-09 | End: 2024-12-11

## 2024-12-09 RX ORDER — FUROSEMIDE 10 MG/ML
40 INJECTION INTRAMUSCULAR; INTRAVENOUS ONCE
Status: COMPLETED | OUTPATIENT
Start: 2024-12-09 | End: 2024-12-09

## 2024-12-09 RX ORDER — LOSARTAN POTASSIUM 25 MG/1
25 TABLET ORAL DAILY
Status: DISCONTINUED | OUTPATIENT
Start: 2024-12-09 | End: 2024-12-10

## 2024-12-09 RX ORDER — IPRATROPIUM BROMIDE AND ALBUTEROL SULFATE 2.5; .5 MG/3ML; MG/3ML
1 SOLUTION RESPIRATORY (INHALATION) EVERY 6 HOURS PRN
Status: DISCONTINUED | OUTPATIENT
Start: 2024-12-09 | End: 2024-12-14 | Stop reason: HOSPADM

## 2024-12-09 RX ADMIN — OXYCODONE HYDROCHLORIDE 10 MG: 5 TABLET ORAL at 12:07

## 2024-12-09 RX ADMIN — WATER 80 MG: 1 INJECTION INTRAMUSCULAR; INTRAVENOUS; SUBCUTANEOUS at 04:33

## 2024-12-09 RX ADMIN — IPRATROPIUM BROMIDE AND ALBUTEROL SULFATE 1 DOSE: .5; 3 SOLUTION RESPIRATORY (INHALATION) at 08:04

## 2024-12-09 RX ADMIN — OXYCODONE HYDROCHLORIDE 10 MG: 5 TABLET ORAL at 07:49

## 2024-12-09 RX ADMIN — OXYCODONE HYDROCHLORIDE 10 MG: 5 TABLET ORAL at 04:30

## 2024-12-09 RX ADMIN — Medication 25 MG: at 22:26

## 2024-12-09 RX ADMIN — IPRATROPIUM BROMIDE AND ALBUTEROL SULFATE 1 DOSE: .5; 3 SOLUTION RESPIRATORY (INHALATION) at 16:58

## 2024-12-09 RX ADMIN — FENTANYL CITRATE 100 MCG/HR: 50 INJECTION INTRAVENOUS at 01:08

## 2024-12-09 RX ADMIN — SODIUM CHLORIDE, PRESERVATIVE FREE 10 ML: 5 INJECTION INTRAVENOUS at 07:49

## 2024-12-09 RX ADMIN — Medication 500000 UNITS: at 16:14

## 2024-12-09 RX ADMIN — OXYCODONE HYDROCHLORIDE 10 MG: 5 TABLET ORAL at 20:20

## 2024-12-09 RX ADMIN — HYDRALAZINE HYDROCHLORIDE 20 MG: 20 INJECTION INTRAMUSCULAR; INTRAVENOUS at 10:11

## 2024-12-09 RX ADMIN — Medication 500000 UNITS: at 12:35

## 2024-12-09 RX ADMIN — AZITHROMYCIN MONOHYDRATE 500 MG: 500 INJECTION, POWDER, LYOPHILIZED, FOR SOLUTION INTRAVENOUS at 12:35

## 2024-12-09 RX ADMIN — ENOXAPARIN SODIUM 40 MG: 100 INJECTION SUBCUTANEOUS at 07:49

## 2024-12-09 RX ADMIN — Medication 500000 UNITS: at 20:21

## 2024-12-09 RX ADMIN — LOSARTAN POTASSIUM 25 MG: 25 TABLET, FILM COATED ORAL at 12:07

## 2024-12-09 RX ADMIN — IPRATROPIUM BROMIDE AND ALBUTEROL SULFATE 1 DOSE: .5; 3 SOLUTION RESPIRATORY (INHALATION) at 10:54

## 2024-12-09 RX ADMIN — PROPOFOL 50 MCG/KG/MIN: 10 INJECTION, EMULSION INTRAVENOUS at 06:08

## 2024-12-09 RX ADMIN — AMLODIPINE BESYLATE 10 MG: 5 TABLET ORAL at 07:49

## 2024-12-09 RX ADMIN — ARFORMOTEROL TARTRATE: 15 SOLUTION RESPIRATORY (INHALATION) at 10:54

## 2024-12-09 RX ADMIN — ONDANSETRON 4 MG: 2 INJECTION INTRAMUSCULAR; INTRAVENOUS at 17:16

## 2024-12-09 RX ADMIN — ARFORMOTEROL TARTRATE: 15 SOLUTION RESPIRATORY (INHALATION) at 19:49

## 2024-12-09 RX ADMIN — BUDESONIDE 1000 MCG: 0.5 INHALANT RESPIRATORY (INHALATION) at 08:04

## 2024-12-09 RX ADMIN — SODIUM CHLORIDE, PRESERVATIVE FREE 10 ML: 5 INJECTION INTRAVENOUS at 20:22

## 2024-12-09 RX ADMIN — ACETAMINOPHEN 650 MG: 325 TABLET ORAL at 20:21

## 2024-12-09 RX ADMIN — WATER 1000 MG: 1 INJECTION INTRAMUSCULAR; INTRAVENOUS; SUBCUTANEOUS at 16:13

## 2024-12-09 RX ADMIN — ACETAMINOPHEN 650 MG: 325 TABLET ORAL at 16:46

## 2024-12-09 RX ADMIN — FUROSEMIDE 40 MG: 10 INJECTION, SOLUTION INTRAMUSCULAR; INTRAVENOUS at 07:49

## 2024-12-09 RX ADMIN — Medication 3 MG: at 20:21

## 2024-12-09 RX ADMIN — OXYCODONE HYDROCHLORIDE 10 MG: 5 TABLET ORAL at 16:14

## 2024-12-09 RX ADMIN — PANTOPRAZOLE SODIUM 40 MG: 40 INJECTION, POWDER, LYOPHILIZED, FOR SOLUTION INTRAVENOUS at 07:48

## 2024-12-09 ASSESSMENT — PULMONARY FUNCTION TESTS
PIF_VALUE: 14
PIF_VALUE: 24
PIF_VALUE: 21

## 2024-12-09 ASSESSMENT — PAIN SCALES - GENERAL
PAINLEVEL_OUTOF10: 0
PAINLEVEL_OUTOF10: 3

## 2024-12-09 NOTE — PLAN OF CARE
Problem: Occupational Therapy - Adult  Goal: By Discharge: Performs self-care activities at highest level of function for planned discharge setting.  See evaluation for individualized goals.  Description: FUNCTIONAL STATUS PRIOR TO ADMISSION:  Patient reports living in 2 level home with family and being IND PTA. No DME used at baseline.     Occupational Therapy Goals:  Initiated 12/9/2024  1.  Patient will perform grooming in standing independently within 7 day(s).  2.  Patient will perform bathing independently within 7 day(s).  3.  Patient will perform lower body dressing independently within 7 day(s).  4.  Patient will perform toilet transfers independently within 7 day(s).  5.  Patient will perform all aspects of toileting independently within 7 day(s).  6.  Patient will participate in upper extremity therapeutic exercise/activities with Supervision for 5 minutes within 7 day(s).    7.  Patient will utilize energy conservation techniques during functional activities with verbal cues within 7 day(s).    Outcome: Progressing    OCCUPATIONAL THERAPY EVALUATION    Patient: Jose Servin (64 y.o. male)  Date: 12/9/2024  Primary Diagnosis: COPD exacerbation ([facility]) [J44.1]         Precautions:                    ASSESSMENT :  The patient is limited by decreased functional mobility, independence in ADLs, high-level IADLs, strength, body mechanics, activity tolerance, endurance, coordination, balance s/p admission for CPOD exacerbation requiring intubation - extubated this morning. Patient reports living in 2 level home with family and being IND PTA. No DME used at baseline. Today, patient with supervision for bed mobility and CGA for standing and to transfer to chair. Patient VSS throughout mobility on 4L NC. Once in chair, patient with setup for grooming to wash face. Patient left sitting in chair with call bell in reach, RN aware, family bedside, all needs met, VSS. Patient will benefit from continued acute OT  Verbalized understanding;Continued education needed    Thank you for this referral.  Loli Bae OT  Minutes: 34    Occupational Therapy Evaluation Charge Determination   History Examination Decision-Making   LOW Complexity : Brief history review  LOW Complexity: 1-3 Performance deficits relating to physical, cognitive, or psychosocial skills that result in activity limitations and/or participation restrictions MEDIUM Complexity: Patient may present with comorbidities that affect occupational performance. Minimal to moderate modifications of tasks or assist (eg. physical or verbal) with assist is necessary to enable pt to complete eval   Based on the above components, the patient evaluation is determined to be of the following complexity level: Low

## 2024-12-09 NOTE — PROGRESS NOTES
[facility] ICU Progress Note        Jose Servin  1960  [id number]  12/9/2024    Summary:    64 year old male with history of HTN, COPD (no PFTs) and substance abuse (heroin) who presented to the ED with complaint of SOB - admitted to the ICU for respiratory failure and need for intubation.     Assessment and plan:  Acute hypoxic and hypercapnic respiratory failure   Likely opioid withdrawal. Last use one day prior  -intubated 12/7  -cont mechanical ventilation  -vent changes made   -SAT SBT daily     - oxycodone 10 Q4 hours  - fentanyl gtt - wean as tolerated  - addiction psych consult on Monday  - neuro checks per unit protocol   -CXR reviewed and ETT in good position     AECOPD:   -change to b/b nebs  -duonebs to prn   -methylpred to 40 daily     Volume overload:   No known cardiac history. Last TTE normal LV and RV function  - goal MAP>=65  - continue home amlodipine  - PRN hydralazine   -start lasix for diuresis lasix 40 IV x 1 now      BIRGIT on CKD:    -cr trend is down.  Cr 1.65 from 1.8  - goal K>=4, Mg>=2, Phos >3  - daily BMP  - strict I/O's; daily weights  - avoid nephrotoxic medications     Polycythemia:   - - likely due to tobacco use/ COPD. DDX hemoconcentration  -better with IVF    - NS at 125 for 12 hours  - daily CBC  - consider smear if no improvement     RVP positive for rhinovirus. Empiric ABX while critically ill  - continue azithromycin  - continue ceftriaxone  - follow culture data    Discussed with  case management team, discussed with Family     HPI:  Remains critically ill intubated.  Tolerating SBT this am.        ICU DAILY CHECKLIST     Code Status:full   DVT Prophylaxis:lovenox  T/L/D: PIVs  SUP: NA   Diet: bedside eval post extubation if tolerates  Activity Level:PT OT  mobilize daily   ABCDEF Bundle/Checklist Completed:Yes  Disposition: cont ICU care  Multidisciplinary Rounds Completed: yes  Goals of Care Discussion/Palliative: yes  Patient/Family Updated:

## 2024-12-10 LAB
ANION GAP SERPL CALC-SCNC: 3 MMOL/L (ref 2–12)
ARTERIAL PATENCY WRIST A: ABNORMAL
ARTERIAL PATENCY WRIST A: YES
BASE DEFICIT BLDA-SCNC: 2.2 MMOL/L
BASE EXCESS BLD CALC-SCNC: 2.9 MMOL/L
BASE EXCESS BLDV CALC-SCNC: 7.3 MMOL/L
BASOPHILS # BLD: 0 K/UL (ref 0–0.1)
BASOPHILS NFR BLD: 0 % (ref 0–1)
BDY SITE: ABNORMAL
BDY SITE: ABNORMAL
BREATHS.SPONTANEOUS ON VENT: 22
BUN SERPL-MCNC: 48 MG/DL (ref 6–20)
BUN/CREAT SERPL: 36 (ref 12–20)
CA-I BLD-SCNC: 1.3 MMOL/L (ref 1.13–1.32)
CA-I BLD-SCNC: 1.33 MMOL/L (ref 1.12–1.32)
CALCIUM SERPL-MCNC: 8.9 MG/DL (ref 8.5–10.1)
CHLORIDE SERPL-SCNC: 94 MMOL/L (ref 97–108)
CO2 SERPL-SCNC: 35 MMOL/L (ref 21–32)
CREAT SERPL-MCNC: 1.34 MG/DL (ref 0.7–1.3)
DIFFERENTIAL METHOD BLD: ABNORMAL
EOSINOPHIL # BLD: 0 K/UL (ref 0–0.4)
EOSINOPHIL NFR BLD: 0 % (ref 0–7)
ERYTHROCYTE [DISTWIDTH] IN BLOOD BY AUTOMATED COUNT: 14.7 % (ref 11.5–14.5)
FIO2 ON VENT: 45 %
GLUCOSE SERPL-MCNC: 83 MG/DL (ref 65–100)
HCO3 BLD-SCNC: 36.4 MMOL/L (ref 21–28)
HCO3 BLDA-SCNC: 32 MMOL/L (ref 22–26)
HCO3 BLDV-SCNC: 35.9 MMOL/L (ref 23–28)
HCT VFR BLD AUTO: 52.5 % (ref 36.6–50.3)
HGB BLD-MCNC: 16.6 G/DL (ref 12.1–17)
IMM GRANULOCYTES # BLD AUTO: 0.1 K/UL (ref 0–0.04)
IMM GRANULOCYTES NFR BLD AUTO: 1 % (ref 0–0.5)
IPAP/PIP: 12
LYMPHOCYTES # BLD: 0.6 K/UL (ref 0.8–3.5)
LYMPHOCYTES NFR BLD: 5 % (ref 12–49)
MCH RBC QN AUTO: 27.6 PG (ref 26–34)
MCHC RBC AUTO-ENTMCNC: 31.6 G/DL (ref 30–36.5)
MCV RBC AUTO: 87.4 FL (ref 80–99)
MONOCYTES # BLD: 0.9 K/UL (ref 0–1)
MONOCYTES NFR BLD: 9 % (ref 5–13)
NEUTS SEG # BLD: 9.3 K/UL (ref 1.8–8)
NEUTS SEG NFR BLD: 86 % (ref 32–75)
NRBC # BLD: 0 K/UL (ref 0–0.01)
NRBC BLD-RTO: 0 PER 100 WBC
NT PRO BNP: 2021 PG/ML
PCO2 BLD: 86.2 MMHG (ref 35–48)
PCO2 BLDA: 106 MMHG (ref 35–45)
PCO2 BLDV: 59.5 MMHG (ref 41–51)
PEEP RESPIRATORY: 8
PH BLD: 7.23 (ref 7.35–7.45)
PH BLDA: 7.1 (ref 7.35–7.45)
PH BLDV: 7.39 (ref 7.32–7.42)
PLATELET # BLD AUTO: 120 K/UL (ref 150–400)
PMV BLD AUTO: 10.6 FL (ref 8.9–12.9)
PO2 BLD: 58 MMHG (ref 83–108)
PO2 BLDA: 83 MMHG (ref 80–100)
PO2 BLDV: 32 MMHG (ref 25–40)
POTASSIUM SERPL-SCNC: 4.3 MMOL/L (ref 3.5–5.1)
POTASSIUM SERPL-SCNC: ABNORMAL MMOL/L (ref 3.5–5.1)
RBC # BLD AUTO: 6.01 M/UL (ref 4.1–5.7)
SAO2 % BLD: 81.9 % (ref 92–97)
SAO2 % BLD: 91 % (ref 92–97)
SAO2 % BLDV: 59.5 % (ref 65–88)
SAO2% DEVICE SAO2% SENSOR NAME: ABNORMAL
SERVICE CMNT-IMP: ABNORMAL
SERVICE CMNT-IMP: ABNORMAL
SODIUM SERPL-SCNC: 132 MMOL/L (ref 136–145)
SPECIMEN SITE: ABNORMAL
SPECIMEN TYPE: ABNORMAL
SPECIMEN TYPE: ABNORMAL
VENTILATION MODE VENT: ABNORMAL
WBC # BLD AUTO: 10.8 K/UL (ref 4.1–11.1)

## 2024-12-10 PROCEDURE — 2580000003 HC RX 258: Performed by: STUDENT IN AN ORGANIZED HEALTH CARE EDUCATION/TRAINING PROGRAM

## 2024-12-10 PROCEDURE — 6370000000 HC RX 637 (ALT 250 FOR IP): Performed by: STUDENT IN AN ORGANIZED HEALTH CARE EDUCATION/TRAINING PROGRAM

## 2024-12-10 PROCEDURE — 6370000000 HC RX 637 (ALT 250 FOR IP): Performed by: PHYSICIAN ASSISTANT

## 2024-12-10 PROCEDURE — 80048 BASIC METABOLIC PNL TOTAL CA: CPT

## 2024-12-10 PROCEDURE — 2000000000 HC ICU R&B

## 2024-12-10 PROCEDURE — 6360000002 HC RX W HCPCS: Performed by: STUDENT IN AN ORGANIZED HEALTH CARE EDUCATION/TRAINING PROGRAM

## 2024-12-10 PROCEDURE — 2700000000 HC OXYGEN THERAPY PER DAY

## 2024-12-10 PROCEDURE — 5A09357 ASSISTANCE WITH RESPIRATORY VENTILATION, LESS THAN 24 CONSECUTIVE HOURS, CONTINUOUS POSITIVE AIRWAY PRESSURE: ICD-10-PCS | Performed by: FAMILY MEDICINE

## 2024-12-10 PROCEDURE — 6370000000 HC RX 637 (ALT 250 FOR IP): Performed by: INTERNAL MEDICINE

## 2024-12-10 PROCEDURE — 84132 ASSAY OF SERUM POTASSIUM: CPT

## 2024-12-10 PROCEDURE — 85025 COMPLETE CBC W/AUTO DIFF WBC: CPT

## 2024-12-10 PROCEDURE — 6360000002 HC RX W HCPCS: Performed by: PHYSICIAN ASSISTANT

## 2024-12-10 PROCEDURE — 6360000002 HC RX W HCPCS: Performed by: NURSE PRACTITIONER

## 2024-12-10 PROCEDURE — 36415 COLL VENOUS BLD VENIPUNCTURE: CPT

## 2024-12-10 PROCEDURE — 94640 AIRWAY INHALATION TREATMENT: CPT

## 2024-12-10 PROCEDURE — 94660 CPAP INITIATION&MGMT: CPT

## 2024-12-10 PROCEDURE — 6360000002 HC RX W HCPCS: Performed by: INTERNAL MEDICINE

## 2024-12-10 PROCEDURE — 83880 ASSAY OF NATRIURETIC PEPTIDE: CPT

## 2024-12-10 PROCEDURE — 2580000003 HC RX 258: Performed by: INTERNAL MEDICINE

## 2024-12-10 PROCEDURE — 6370000000 HC RX 637 (ALT 250 FOR IP): Performed by: NURSE PRACTITIONER

## 2024-12-10 PROCEDURE — 2580000003 HC RX 258: Performed by: NURSE PRACTITIONER

## 2024-12-10 RX ORDER — TRAZODONE HYDROCHLORIDE 100 MG/1
100 TABLET ORAL NIGHTLY
Status: DISCONTINUED | OUTPATIENT
Start: 2024-12-10 | End: 2024-12-14 | Stop reason: HOSPADM

## 2024-12-10 RX ORDER — LOSARTAN POTASSIUM 50 MG/1
50 TABLET ORAL DAILY
Status: DISCONTINUED | OUTPATIENT
Start: 2024-12-10 | End: 2024-12-11

## 2024-12-10 RX ORDER — MULTIVITAMIN WITH IRON
1 TABLET ORAL DAILY
Status: DISCONTINUED | OUTPATIENT
Start: 2024-12-10 | End: 2024-12-14 | Stop reason: HOSPADM

## 2024-12-10 RX ORDER — ALPRAZOLAM 0.5 MG
1 TABLET ORAL 3 TIMES DAILY PRN
Status: DISCONTINUED | OUTPATIENT
Start: 2024-12-10 | End: 2024-12-14 | Stop reason: HOSPADM

## 2024-12-10 RX ORDER — FENTANYL CITRATE 50 UG/ML
25 INJECTION, SOLUTION INTRAMUSCULAR; INTRAVENOUS ONCE
Status: COMPLETED | OUTPATIENT
Start: 2024-12-10 | End: 2024-12-10

## 2024-12-10 RX ORDER — LABETALOL HYDROCHLORIDE 5 MG/ML
10 INJECTION, SOLUTION INTRAVENOUS EVERY 6 HOURS PRN
Status: DISCONTINUED | OUTPATIENT
Start: 2024-12-10 | End: 2024-12-11

## 2024-12-10 RX ORDER — METHADONE HYDROCHLORIDE 5 MG/5ML
20 SOLUTION ORAL
Status: DISPENSED | OUTPATIENT
Start: 2024-12-10 | End: 2024-12-11

## 2024-12-10 RX ORDER — FUROSEMIDE 10 MG/ML
20 INJECTION INTRAMUSCULAR; INTRAVENOUS ONCE
Status: COMPLETED | OUTPATIENT
Start: 2024-12-10 | End: 2024-12-10

## 2024-12-10 RX ORDER — TRAMADOL HYDROCHLORIDE 50 MG/1
50 TABLET ORAL EVERY 6 HOURS PRN
Status: DISCONTINUED | OUTPATIENT
Start: 2024-12-10 | End: 2024-12-14 | Stop reason: HOSPADM

## 2024-12-10 RX ORDER — LANOLIN ALCOHOL/MO/W.PET/CERES
100 CREAM (GRAM) TOPICAL DAILY
Status: DISCONTINUED | OUTPATIENT
Start: 2024-12-10 | End: 2024-12-14 | Stop reason: HOSPADM

## 2024-12-10 RX ORDER — METHADONE HYDROCHLORIDE 5 MG/5ML
60 SOLUTION ORAL DAILY
Status: DISCONTINUED | OUTPATIENT
Start: 2024-12-10 | End: 2024-12-11

## 2024-12-10 RX ORDER — OXYCODONE HYDROCHLORIDE 5 MG/1
10 TABLET ORAL EVERY 6 HOURS PRN
Status: DISCONTINUED | OUTPATIENT
Start: 2024-12-10 | End: 2024-12-11

## 2024-12-10 RX ADMIN — THERA TABS 1 TABLET: TAB at 11:44

## 2024-12-10 RX ADMIN — ENOXAPARIN SODIUM 40 MG: 100 INJECTION SUBCUTANEOUS at 08:01

## 2024-12-10 RX ADMIN — SODIUM CHLORIDE, PRESERVATIVE FREE 10 ML: 5 INJECTION INTRAVENOUS at 08:01

## 2024-12-10 RX ADMIN — HYDRALAZINE HYDROCHLORIDE 20 MG: 20 INJECTION INTRAMUSCULAR; INTRAVENOUS at 06:39

## 2024-12-10 RX ADMIN — Medication 500000 UNITS: at 18:25

## 2024-12-10 RX ADMIN — Medication 100 MG: at 11:44

## 2024-12-10 RX ADMIN — TRAMADOL HYDROCHLORIDE 50 MG: 50 TABLET ORAL at 08:25

## 2024-12-10 RX ADMIN — FENTANYL CITRATE 25 MCG: 50 INJECTION INTRAMUSCULAR; INTRAVENOUS at 03:04

## 2024-12-10 RX ADMIN — ARFORMOTEROL TARTRATE: 15 SOLUTION RESPIRATORY (INHALATION) at 08:22

## 2024-12-10 RX ADMIN — TRAMADOL HYDROCHLORIDE 50 MG: 50 TABLET ORAL at 13:50

## 2024-12-10 RX ADMIN — ARFORMOTEROL TARTRATE: 15 SOLUTION RESPIRATORY (INHALATION) at 21:21

## 2024-12-10 RX ADMIN — OXYCODONE HYDROCHLORIDE 10 MG: 5 TABLET ORAL at 19:46

## 2024-12-10 RX ADMIN — Medication 500000 UNITS: at 19:47

## 2024-12-10 RX ADMIN — OXYCODONE HYDROCHLORIDE 10 MG: 5 TABLET ORAL at 04:24

## 2024-12-10 RX ADMIN — OXYCODONE HYDROCHLORIDE 10 MG: 5 TABLET ORAL at 00:11

## 2024-12-10 RX ADMIN — SODIUM CHLORIDE, PRESERVATIVE FREE 10 ML: 5 INJECTION INTRAVENOUS at 19:47

## 2024-12-10 RX ADMIN — HYDRALAZINE HYDROCHLORIDE 20 MG: 20 INJECTION INTRAMUSCULAR; INTRAVENOUS at 13:14

## 2024-12-10 RX ADMIN — Medication 500000 UNITS: at 08:00

## 2024-12-10 RX ADMIN — LOSARTAN POTASSIUM 50 MG: 50 TABLET, FILM COATED ORAL at 08:00

## 2024-12-10 RX ADMIN — TRAZODONE HYDROCHLORIDE 100 MG: 100 TABLET ORAL at 19:47

## 2024-12-10 RX ADMIN — WATER 1000 MG: 1 INJECTION INTRAMUSCULAR; INTRAVENOUS; SUBCUTANEOUS at 13:50

## 2024-12-10 RX ADMIN — IPRATROPIUM BROMIDE AND ALBUTEROL SULFATE 1 DOSE: .5; 3 SOLUTION RESPIRATORY (INHALATION) at 08:22

## 2024-12-10 RX ADMIN — PANTOPRAZOLE SODIUM 40 MG: 40 INJECTION, POWDER, LYOPHILIZED, FOR SOLUTION INTRAVENOUS at 08:01

## 2024-12-10 RX ADMIN — HYDRALAZINE HYDROCHLORIDE 20 MG: 20 INJECTION INTRAMUSCULAR; INTRAVENOUS at 09:51

## 2024-12-10 RX ADMIN — HYDRALAZINE HYDROCHLORIDE 20 MG: 20 INJECTION INTRAMUSCULAR; INTRAVENOUS at 02:08

## 2024-12-10 RX ADMIN — AMLODIPINE BESYLATE 10 MG: 5 TABLET ORAL at 08:00

## 2024-12-10 RX ADMIN — WATER 40 MG: 1 INJECTION INTRAMUSCULAR; INTRAVENOUS; SUBCUTANEOUS at 08:01

## 2024-12-10 RX ADMIN — OXYCODONE HYDROCHLORIDE 10 MG: 5 TABLET ORAL at 08:00

## 2024-12-10 RX ADMIN — Medication 60 MG: at 13:50

## 2024-12-10 RX ADMIN — ALPRAZOLAM 1 MG: 0.5 TABLET ORAL at 08:48

## 2024-12-10 RX ADMIN — FUROSEMIDE 20 MG: 10 INJECTION, SOLUTION INTRAMUSCULAR; INTRAVENOUS at 11:44

## 2024-12-10 RX ADMIN — ACETAMINOPHEN 650 MG: 325 TABLET ORAL at 08:11

## 2024-12-10 RX ADMIN — Medication 500000 UNITS: at 11:44

## 2024-12-10 ASSESSMENT — PAIN SCALES - GENERAL
PAINLEVEL_OUTOF10: 6
PAINLEVEL_OUTOF10: 5
PAINLEVEL_OUTOF10: 4
PAINLEVEL_OUTOF10: 0
PAINLEVEL_OUTOF10: 4
PAINLEVEL_OUTOF10: 2
PAINLEVEL_OUTOF10: 4
PAINLEVEL_OUTOF10: 6
PAINLEVEL_OUTOF10: 6
PAINLEVEL_OUTOF10: 0
PAINLEVEL_OUTOF10: 4

## 2024-12-10 ASSESSMENT — PAIN DESCRIPTION - DESCRIPTORS: DESCRIPTORS: ACHING;POUNDING;THROBBING

## 2024-12-10 ASSESSMENT — PAIN DESCRIPTION - ORIENTATION: ORIENTATION: ANTERIOR

## 2024-12-10 ASSESSMENT — PAIN DESCRIPTION - LOCATION: LOCATION: HEAD

## 2024-12-10 NOTE — PROGRESS NOTES
I was able to verify his dose of 120 mg with [facility]    He took 120 mg there last on 12/6.  He has taken that dose most days going back to 11/10, there are 4 missed doses during the last month.      Weekly drug tests going back months are consistently positive for cocaine and fentanyl as well as methadone and methadone metabolite.

## 2024-12-10 NOTE — CONSULTS
Initial Addiction Medicine Consultation            IDENTIFICATION:    Patient Name  Jose Servin   Date of Birth 1960   [facility] [id number]   Medical Record Number  [id number]      Age  64 y.o.   PCP Sylvia Orozco APRN - NP   Admit date:  12/7/2024    Room Number  7109/01  @ [facility]   Date of Service  12/10/2024            ASSESSMENT & PLAN        Jose Servin, is a 64 y.o.  male with opioid use disorder admitted with COPD exacerbation.     Pt. Reports outpatient care for OUD at [facility] daily dose 120 mg which he took the day of admission.     Given risk of sedation, resume treatment at 60 mg once daily and will place 20 mg prn order that can be given after 3 hours if there is no sedation and he is still experiencing withdrawal.      If his breathing and mental status improves (and outpatient dose can be verified) plan to up titrate dose to match his outpatient methadone dose.      I called multiple times to his OTP and left a message but have not been able to verify his dose.       Will follow up tomorrow.    Max Zaragoza MD [facility]  [facility]  [phone]  Fax [phone]    HISTORY         REASON FOR HOSPITALIZATION:  CC: \"opioid use disorder admitted for COPD exacerbation\".      HISTORY OF PRESENT ILLNESS:    The patient, Jose Servin, is a 64 y.o.  male with a history of opioid use disorder who was admitted with a COPD exacerbation and is currently on BIPAP which limits the history as he is able to give for 1 or 2 word answers.    He reports using opioids consistently since around the age of 16.  He denies ever injecting intravenously.  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  He reports dosing consistently at 120 mg.  He reports dosing in person 4 days a week and getting 3 take home doses.  The methadone dose does not cover his cravings for  education: Not on file    Highest education level: Not on file   Occupational History    Not on file   Tobacco Use    Smoking status: Every Day     Current packs/day: 1.00     Types: Cigarettes    Smokeless tobacco: Never   Substance and Sexual Activity    Alcohol use: No    Drug use: No    Sexual activity: Not on file   Other Topics Concern    Not on file   Social History Narrative    Lives with girlfriend, has one son, age 14, lives with mother. Works at [employer] for 3 years     Social Determinants of Health     Financial Resource Strain: Low Risk  (7/10/2023)    Overall Financial Resource Strain (CARDIA)     Difficulty of Paying Living Expenses: Not very hard   Food Insecurity: No Food Insecurity (12/9/2024)    Hunger Vital Sign     Worried About Running Out of Food in the Last Year: Never true     Ran Out of Food in the Last Year: Never true   Transportation Needs: No Transportation Needs (12/9/2024)    PRAPARE - Transportation     Lack of Transportation (Medical): No     Lack of Transportation (Non-Medical): No   Physical Activity: Not on file   Stress: Not on file   Social Connections: Not on file   Intimate Partner Violence: Not on file   Housing Stability: Low Risk  (12/9/2024)    Housing Stability Vital Sign     Unable to Pay for Housing in the Last Year: No     Number of Times Moved in the Last Year: 0     Homeless in the Last Year: No      FAMILY HISTORY: History reviewed. No pertinent family history.   No family history on file.    REVIEW OF SYSTEMS:   A 12 system review of systems was completed and was negative for the following 12 systems except as noted above CV, Pulmonary, HEENT, skin, lymphatics, GI, , hematologic, renal, endocrinology, rheumatologic, MSK         Physical Exam     BP (!) 167/117   Pulse (!) 106   Temp 100.1 °F (37.8 °C) (Oral)   Resp 26   Wt 54 kg (119 lb 0.8 oz)   SpO2 98%   BMI 17.58 kg/m²     COWS: 12 moderate withdrawal    Alert and Oriented  Respiratory distress on

## 2024-12-10 NOTE — PROGRESS NOTES
[facility] ICU Progress Note        Jose Servin  1960  [id number]  12/10/2024      Assessment and plan:  Acute hypoxic and hypercapnic respiratory failure   Likely opioid withdrawal. Last use one day prior + rhinovirus   -intubated 12/7- extubated 12/9  -add bipap at hs   -cont methylpred 40 daily   -cont B/B nebs and albuterol prn   -cont o2 to keep spo2 > 87%       AECOPD:   -cont b/b nebs  -duonebs to prn   -methylpred to 40 daily   -cont cef/azithro      Volume overload:  post lasix for diuresus   No known cardiac history. Last TTE normal LV and RV function  - goal MAP>=65  - continue home amlodipine  - added losartan and increased to 50 daily   -check BNP this am --->  elevated but trend is down   -lasix 20 IV x 1      BIRGIT on CKD:    -cr trend is down.  Cr 1.65 from 1.8  - goal K>=4, Mg>=2, Phos >3  - daily BMP  - strict I/O's; daily weights  - avoid nephrotoxic medications     Polycythemia:   - - likely due to tobacco use/ COPD. DDX hemoconcentration  -better with IVF    - daily CBC     RVP positive for rhinovirus. Empiric ABX while critically ill  - continue azithromycin  - continue ceftriaxone  - follow culture data     Discussed with  case management team, discussed with Family     HPI:  In distress this am.  + wheezing.  +  SOB,  tripod position.  + urinary retention.  He is critically ill.  High risk reintubation.        ICU DAILY CHECKLIST     Code Status:full   DVT Prophylaxis:lovenox   T/L/D: PIVs  SUP: na   Diet: regular  Activity Level: mobilize daily   ABCDEF Bundle/Checklist Completed:Yes  Disposition: cont   Icu care  Multidisciplinary Rounds Completed: yes  Goals of Care Discussion/Palliative: yes  Patient/Family Updated: yes      OBJECTIVE  Vitals:    12/10/24 0500 12/10/24 0530 12/10/24 0600 12/10/24 0823   BP: (!) 170/97 (!) 170/99 (!) 179/109    Pulse: 85 77 80 91   Resp: 19 14 16 20   Temp:       TempSrc:       SpO2: 99% 100% 98% 100%   Weight:   54 kg (119 lb 0.8

## 2024-12-10 NOTE — PROGRESS NOTES
Physical Therapy Note    PT orders received and acknowledged. Chart reviewed. Patient is asleep in bedside chair. RN requests to hold at this time. Will return for acute PT evaluation as able.

## 2024-12-10 NOTE — PROGRESS NOTES
Occupational Therapy Contact Note  12/10/24    Attempted to see pt for OT treatment, RN requesting therapy defer at this time as he has been restless all morning and just fell asleep. Will follow back up as time allows later.    Thank you,  Raquel Mcmullen, OTD, OTR/L

## 2024-12-10 NOTE — PROGRESS NOTES
4 Eyes Skin Assessment     NAME:  Jose Servin  YOB: 1960  MEDICAL RECORD NUMBER:  [id number]    The patient is being assessed for  Other Dual Skin Day      I agree that at least one RN has performed a thorough Head to Toe Skin Assessment on the patient. ALL assessment sites listed below have been assessed.      Areas assessed by both nurses:    Head, Face, Ears, Shoulders, Back, Chest, Arms, Elbows, Hands, Sacrum. Buttock, Coccyx, Ischium, Legs. Feet and Heels, and Under Medical Devices         Does the Patient have a Wound? No noted wound(s)       Roger Prevention initiated by RN: Yes  Wound Care Orders initiated by RN: No    Pressure Injury (Stage 3,4, Unstageable, DTI, NWPT, and Complex wounds) if present, place Wound referral order by RN under : No    New Ostomies, if present place, Ostomy referral order under : No     Nurse 1 eSignature: Electronically signed by Marlon Wilson RN on 12/10/24 at 12:15 PM EST    **SHARE this note so that the co-signing nurse can place an eSignature**    Nurse 2 eSignature: Electronically signed by La Benton RN on 12/10/24 at 12:22 PM EST

## 2024-12-11 LAB
ANION GAP SERPL CALC-SCNC: 6 MMOL/L (ref 2–12)
ARTERIAL PATENCY WRIST A: POSITIVE
BASE EXCESS BLD CALC-SCNC: 13.1 MMOL/L
BASOPHILS # BLD: 0 K/UL (ref 0–0.1)
BASOPHILS NFR BLD: 0 % (ref 0–1)
BDY SITE: ABNORMAL
BUN SERPL-MCNC: 38 MG/DL (ref 6–20)
BUN/CREAT SERPL: 37 (ref 12–20)
CALCIUM SERPL-MCNC: 9.1 MG/DL (ref 8.5–10.1)
CHLORIDE SERPL-SCNC: 96 MMOL/L (ref 97–108)
CO2 SERPL-SCNC: 33 MMOL/L (ref 21–32)
CREAT SERPL-MCNC: 1.02 MG/DL (ref 0.7–1.3)
DIFFERENTIAL METHOD BLD: ABNORMAL
EOSINOPHIL # BLD: 0 K/UL (ref 0–0.4)
EOSINOPHIL NFR BLD: 0 % (ref 0–7)
ERYTHROCYTE [DISTWIDTH] IN BLOOD BY AUTOMATED COUNT: 15 % (ref 11.5–14.5)
GAS FLOW.O2 O2 DELIVERY SYS: ABNORMAL
GLUCOSE SERPL-MCNC: 96 MG/DL (ref 65–100)
HCO3 BLD-SCNC: 40 MMOL/L (ref 21–28)
HCT VFR BLD AUTO: 55.9 % (ref 36.6–50.3)
HCV AB SER IA-ACNC: 0.23 INDEX
HCV AB SERPL QL IA: NONREACTIVE
HGB BLD-MCNC: 17.7 G/DL (ref 12.1–17)
IMM GRANULOCYTES # BLD AUTO: 0 K/UL (ref 0–0.04)
IMM GRANULOCYTES NFR BLD AUTO: 1 % (ref 0–0.5)
LYMPHOCYTES # BLD: 1 K/UL (ref 0.8–3.5)
LYMPHOCYTES NFR BLD: 12 % (ref 12–49)
MCH RBC QN AUTO: 27.5 PG (ref 26–34)
MCHC RBC AUTO-ENTMCNC: 31.7 G/DL (ref 30–36.5)
MCV RBC AUTO: 86.9 FL (ref 80–99)
MONOCYTES # BLD: 1.1 K/UL (ref 0–1)
MONOCYTES NFR BLD: 14 % (ref 5–13)
NEUTS SEG # BLD: 5.8 K/UL (ref 1.8–8)
NEUTS SEG NFR BLD: 73 % (ref 32–75)
NRBC # BLD: 0 K/UL (ref 0–0.01)
NRBC BLD-RTO: 0 PER 100 WBC
O2/TOTAL GAS SETTING VFR VENT: 33 %
PCO2 BLD: 51.3 MMHG (ref 35–48)
PH BLD: 7.5 (ref 7.35–7.45)
PLATELET # BLD AUTO: 112 K/UL (ref 150–400)
PMV BLD AUTO: 11.4 FL (ref 8.9–12.9)
PO2 BLD: 58 MMHG (ref 83–108)
POTASSIUM SERPL-SCNC: 5.2 MMOL/L (ref 3.5–5.1)
RBC # BLD AUTO: 6.43 M/UL (ref 4.1–5.7)
SAO2 % BLD: 91.3 % (ref 92–97)
SODIUM SERPL-SCNC: 135 MMOL/L (ref 136–145)
SPECIMEN TYPE: ABNORMAL
WBC # BLD AUTO: 7.9 K/UL (ref 4.1–11.1)

## 2024-12-11 PROCEDURE — 86803 HEPATITIS C AB TEST: CPT

## 2024-12-11 PROCEDURE — 6360000002 HC RX W HCPCS: Performed by: PHYSICIAN ASSISTANT

## 2024-12-11 PROCEDURE — 2580000003 HC RX 258: Performed by: INTERNAL MEDICINE

## 2024-12-11 PROCEDURE — 6360000002 HC RX W HCPCS: Performed by: NURSE PRACTITIONER

## 2024-12-11 PROCEDURE — 97535 SELF CARE MNGMENT TRAINING: CPT

## 2024-12-11 PROCEDURE — 6370000000 HC RX 637 (ALT 250 FOR IP): Performed by: INTERNAL MEDICINE

## 2024-12-11 PROCEDURE — 6370000000 HC RX 637 (ALT 250 FOR IP): Performed by: NURSE PRACTITIONER

## 2024-12-11 PROCEDURE — 97530 THERAPEUTIC ACTIVITIES: CPT

## 2024-12-11 PROCEDURE — 94640 AIRWAY INHALATION TREATMENT: CPT

## 2024-12-11 PROCEDURE — 36415 COLL VENOUS BLD VENIPUNCTURE: CPT

## 2024-12-11 PROCEDURE — 2580000003 HC RX 258: Performed by: NURSE PRACTITIONER

## 2024-12-11 PROCEDURE — 97161 PT EVAL LOW COMPLEX 20 MIN: CPT

## 2024-12-11 PROCEDURE — 6370000000 HC RX 637 (ALT 250 FOR IP): Performed by: PHYSICIAN ASSISTANT

## 2024-12-11 PROCEDURE — 2700000000 HC OXYGEN THERAPY PER DAY

## 2024-12-11 PROCEDURE — 2580000003 HC RX 258: Performed by: STUDENT IN AN ORGANIZED HEALTH CARE EDUCATION/TRAINING PROGRAM

## 2024-12-11 PROCEDURE — 36600 WITHDRAWAL OF ARTERIAL BLOOD: CPT

## 2024-12-11 PROCEDURE — 82803 BLOOD GASES ANY COMBINATION: CPT

## 2024-12-11 PROCEDURE — 6360000002 HC RX W HCPCS: Performed by: INTERNAL MEDICINE

## 2024-12-11 PROCEDURE — 6360000002 HC RX W HCPCS: Performed by: STUDENT IN AN ORGANIZED HEALTH CARE EDUCATION/TRAINING PROGRAM

## 2024-12-11 PROCEDURE — 80048 BASIC METABOLIC PNL TOTAL CA: CPT

## 2024-12-11 PROCEDURE — 85025 COMPLETE CBC W/AUTO DIFF WBC: CPT

## 2024-12-11 PROCEDURE — 94760 N-INVAS EAR/PLS OXIMETRY 1: CPT

## 2024-12-11 PROCEDURE — 2060000000 HC ICU INTERMEDIATE R&B

## 2024-12-11 RX ORDER — METHADONE HYDROCHLORIDE 5 MG/5ML
120 SOLUTION ORAL DAILY
Status: DISCONTINUED | OUTPATIENT
Start: 2024-12-12 | End: 2024-12-14 | Stop reason: HOSPADM

## 2024-12-11 RX ORDER — NICOTINE 21 MG/24HR
1 PATCH, TRANSDERMAL 24 HOURS TRANSDERMAL DAILY
Status: DISCONTINUED | OUTPATIENT
Start: 2024-12-11 | End: 2024-12-14 | Stop reason: HOSPADM

## 2024-12-11 RX ORDER — LOSARTAN POTASSIUM 50 MG/1
100 TABLET ORAL DAILY
Status: DISCONTINUED | OUTPATIENT
Start: 2024-12-12 | End: 2024-12-14 | Stop reason: HOSPADM

## 2024-12-11 RX ORDER — METHADONE HYDROCHLORIDE 5 MG/5ML
60 SOLUTION ORAL ONCE
Status: COMPLETED | OUTPATIENT
Start: 2024-12-11 | End: 2024-12-11

## 2024-12-11 RX ORDER — HYDRALAZINE HYDROCHLORIDE 20 MG/ML
20 INJECTION INTRAMUSCULAR; INTRAVENOUS EVERY 4 HOURS PRN
Status: DISCONTINUED | OUTPATIENT
Start: 2024-12-11 | End: 2024-12-14 | Stop reason: HOSPADM

## 2024-12-11 RX ORDER — PREDNISONE 10 MG/1
10 TABLET ORAL DAILY
Status: DISCONTINUED | OUTPATIENT
Start: 2024-12-17 | End: 2024-12-14 | Stop reason: HOSPADM

## 2024-12-11 RX ORDER — METHADONE HYDROCHLORIDE 5 MG/5ML
60 SOLUTION ORAL DAILY
Status: DISCONTINUED | OUTPATIENT
Start: 2024-12-11 | End: 2024-12-11

## 2024-12-11 RX ORDER — LABETALOL HYDROCHLORIDE 5 MG/ML
10 INJECTION, SOLUTION INTRAVENOUS EVERY 4 HOURS PRN
Status: DISCONTINUED | OUTPATIENT
Start: 2024-12-11 | End: 2024-12-14 | Stop reason: HOSPADM

## 2024-12-11 RX ORDER — PREDNISONE 20 MG/1
20 TABLET ORAL DAILY
Status: DISCONTINUED | OUTPATIENT
Start: 2024-12-12 | End: 2024-12-14 | Stop reason: HOSPADM

## 2024-12-11 RX ORDER — BUPROPION HYDROCHLORIDE 100 MG/1
150 TABLET ORAL DAILY
Status: DISCONTINUED | OUTPATIENT
Start: 2024-12-11 | End: 2024-12-13

## 2024-12-11 RX ADMIN — HYDRALAZINE HYDROCHLORIDE 20 MG: 20 INJECTION INTRAMUSCULAR; INTRAVENOUS at 06:23

## 2024-12-11 RX ADMIN — AMLODIPINE BESYLATE 10 MG: 5 TABLET ORAL at 08:06

## 2024-12-11 RX ADMIN — Medication 500000 UNITS: at 08:06

## 2024-12-11 RX ADMIN — OXYCODONE HYDROCHLORIDE 10 MG: 5 TABLET ORAL at 06:24

## 2024-12-11 RX ADMIN — ARFORMOTEROL TARTRATE: 15 SOLUTION RESPIRATORY (INHALATION) at 08:53

## 2024-12-11 RX ADMIN — BUPROPION HYDROCHLORIDE 150 MG: 100 TABLET, FILM COATED ORAL at 11:19

## 2024-12-11 RX ADMIN — WATER 1000 MG: 1 INJECTION INTRAMUSCULAR; INTRAVENOUS; SUBCUTANEOUS at 14:37

## 2024-12-11 RX ADMIN — Medication 60 MG: at 08:08

## 2024-12-11 RX ADMIN — Medication 3 MG: at 01:10

## 2024-12-11 RX ADMIN — HYDRALAZINE HYDROCHLORIDE 20 MG: 20 INJECTION INTRAMUSCULAR; INTRAVENOUS at 01:10

## 2024-12-11 RX ADMIN — Medication 100 MG: at 08:06

## 2024-12-11 RX ADMIN — ARFORMOTEROL TARTRATE: 15 SOLUTION RESPIRATORY (INHALATION) at 20:12

## 2024-12-11 RX ADMIN — HYDRALAZINE HYDROCHLORIDE 20 MG: 20 INJECTION INTRAMUSCULAR; INTRAVENOUS at 11:01

## 2024-12-11 RX ADMIN — ALPRAZOLAM 1 MG: 0.5 TABLET ORAL at 21:26

## 2024-12-11 RX ADMIN — LOSARTAN POTASSIUM 50 MG: 50 TABLET, FILM COATED ORAL at 08:06

## 2024-12-11 RX ADMIN — SODIUM CHLORIDE, PRESERVATIVE FREE 10 ML: 5 INJECTION INTRAVENOUS at 08:06

## 2024-12-11 RX ADMIN — Medication 60 MG: at 12:00

## 2024-12-11 RX ADMIN — SODIUM CHLORIDE, PRESERVATIVE FREE 10 ML: 5 INJECTION INTRAVENOUS at 21:26

## 2024-12-11 RX ADMIN — PANTOPRAZOLE SODIUM 40 MG: 40 INJECTION, POWDER, LYOPHILIZED, FOR SOLUTION INTRAVENOUS at 08:06

## 2024-12-11 RX ADMIN — ENOXAPARIN SODIUM 40 MG: 100 INJECTION SUBCUTANEOUS at 08:05

## 2024-12-11 RX ADMIN — THERA TABS 1 TABLET: TAB at 08:06

## 2024-12-11 RX ADMIN — TRAZODONE HYDROCHLORIDE 100 MG: 100 TABLET ORAL at 21:14

## 2024-12-11 RX ADMIN — ONDANSETRON 4 MG: 2 INJECTION INTRAMUSCULAR; INTRAVENOUS at 11:05

## 2024-12-11 RX ADMIN — WATER 40 MG: 1 INJECTION INTRAMUSCULAR; INTRAVENOUS; SUBCUTANEOUS at 08:06

## 2024-12-11 ASSESSMENT — PAIN DESCRIPTION - DESCRIPTORS: DESCRIPTORS: ACHING;THROBBING

## 2024-12-11 ASSESSMENT — PAIN SCALES - GENERAL
PAINLEVEL_OUTOF10: 4
PAINLEVEL_OUTOF10: 0

## 2024-12-11 ASSESSMENT — PAIN DESCRIPTION - ORIENTATION: ORIENTATION: ANTERIOR

## 2024-12-11 ASSESSMENT — PAIN DESCRIPTION - LOCATION: LOCATION: HEAD

## 2024-12-11 NOTE — ACP (ADVANCE CARE PLANNING)
Advance Care Planning     Advance Care Planning Inpatient Note  [facility] Department    Today's Date: 12/11/2024  Unit: [facility] 4 CV SERVICES UNIT    Received request from patient.  Upon review of chart and communication with care team, patient's decision making abilities are not in question.. Patient and Healthcare Decision Maker was/were present in the room during visit.    Goals of ACP Conversation:  Discuss advance care planning documents    Health Care Decision Makers:       Primary Decision Maker: Julianna Palafox - Brother/Sister - [phone]    Secondary Decision Maker: Ester Del Angel - Niece/Nephew - [phone]  Summary:  Completed New Documents    Advance Care Planning Documents (Patient Wishes):  Healthcare Power of /Advance Directive Appointment of Health Care Agent  Living Will/Advance Directive  Anatomical Gift/Organ Donation     Assessment:   responded to room and facilitated an advance medical directive discussion with patient. Patient expressed that he is doing much better and has the necessary information to complete the document. Completed advanced medical directive, left the original with patient gave a copy with the primary decision maker and placed a copy on the chart for scanning.     Interventions:  Provided education on documents for clarity and greater understanding  Discussed and provided education on state decision maker hierarchy  Assisted in the completion of documents according to patient's wishes at this time    Care Preferences Communicated:   No    Outcomes/Plan:  ACP Discussion: Completed  New advance directive completed.  Returned original document(s) to patient, as well as copies for distribution to appointed agents  Copy of advance directive given to staff to scan into medical record.    Electronically signed by Chaplain Radu Resident on 12/11/2024 at 3:51 PM

## 2024-12-11 NOTE — PLAN OF CARE
Problem: Occupational Therapy - Adult  Goal: By Discharge: Performs self-care activities at highest level of function for planned discharge setting.  See evaluation for individualized goals.  Description: FUNCTIONAL STATUS PRIOR TO ADMISSION:  Patient reports living in 2 level home with family and being IND PTA. No DME used at baseline.     Occupational Therapy Goals:  Initiated 12/9/2024  1.  Patient will perform grooming in standing independently within 7 day(s).  2.  Patient will perform bathing independently within 7 day(s).  3.  Patient will perform lower body dressing independently within 7 day(s).  4.  Patient will perform toilet transfers independently within 7 day(s).  5.  Patient will perform all aspects of toileting independently within 7 day(s).  6.  Patient will participate in upper extremity therapeutic exercise/activities with Supervision for 5 minutes within 7 day(s).    7.  Patient will utilize energy conservation techniques during functional activities with verbal cues within 7 day(s).  Outcome: Progressing   OCCUPATIONAL THERAPY TREATMENT  Patient: Jose Servin (64 y.o. male)  Date: 12/11/2024  Primary Diagnosis: COPD exacerbation (HCC) [J44.1]       Precautions:                  Chart, occupational therapy assessment, plan of care, and goals were reviewed.    ASSESSMENT  Patient continues to benefit from skilled OT services and is progressing towards goals. Nursing cleared for therapy and received up in chair, agreeable to therapy.  Received on and let on 13L mid flow with SpO2 >95 % throughout session however with increased work of breathing with activity.  Ed on PLB and ECT through out session.  He tolerated standing at sink for grooming tasks at [facility] and following seated rest, he completed a second static stand to look out window with BUE support on windowsill.  Each stand 3-5 mins long.  Left up in chair.      He is below baseline for ADL tasks.  He has impaired activity tolerance with  pulmonary function, mobility, balance, and generalized strength.  Recommend continued skilled OT services in hospital.  He reports one of his sisters is coming to [facility] 12/14 and will be able to help him.              PLAN :  Patient continues to benefit from skilled intervention to address the above impairments.  Continue treatment per established plan of care to address goals.    Recommend with staff: CGA to chair for meals    Recommend next OT session: progress POC    Recommendation for discharge: (in order for the patient to meet his/her long term goals):   Intermittent occupational therapy up to 2-3x/week in previous living setting with additional support needed for higher level ADLs    Other factors to consider for discharge: lives alone and concern for safely navigating or managing the home environment    IF patient discharges home will need the following DME: continuing to assess with progress and rollator?       SUBJECTIVE:   Patient stated “I want to get moving.”    OBJECTIVE DATA SUMMARY:   Cognitive/Behavioral Status:  Orientation  Overall Orientation Status: Within Normal Limits  Orientation Level: Oriented X4  Cognition  Overall Cognitive Status: WFL    Functional Mobility and Transfers for ADLs:  Bed Mobility:  Bed Mobility Training  Bed Mobility Training: No     Transfers:   Transfer Training  Transfer Training: Yes  Sit to Stand: Minimum assistance  Stand to Sit: Contact-guard assistance  Stand Pivot Transfers: Contact-guard assistance  Bed to Chair: Contact-guard assistance           Balance:     Balance  Sitting: Intact  Standing: Impaired  Standing - Static: Good  Standing - Dynamic: Good;Fair      ADL Intervention:                   Grooming: Contact guard assistance   Grooming Skilled Clinical Factors: standing at sink to wash hands       Patient instructed and indicated understanding the benefits of maintaining activity tolerance, functional mobility, and independence with self care tasks

## 2024-12-11 NOTE — PLAN OF CARE
Problem: Physical Therapy - Adult  Goal: By Discharge: Performs mobility at highest level of function for planned discharge setting.  See evaluation for individualized goals.  Description: FUNCTIONAL STATUS PRIOR TO ADMISSION: Patient was independent and active without use of DME.    HOME SUPPORT PRIOR TO ADMISSION: The patient lived alone with family to provide assistance.    Physical Therapy Goals  Initiated 12/11/2024  1.  Patient will move from supine to sit and sit to supine in bed with independence within 7 day(s).    2.  Patient will perform sit to stand with supervision/set-up within 7 day(s).  3.  Patient will transfer from bed to chair and chair to bed with supervision/set-up using the least restrictive device within 7 day(s).  4.  Patient will ambulate with supervision/set-up for 120 feet with the least restrictive device within 7 day(s).   5.  Patient will ascend/descend 3 stairs with one handrail(s) with supervision/set-up within 7 day(s).   Outcome: Progressing       PHYSICAL THERAPY EVALUATION    Patient: Jose Servin (64 y.o. male)  Date: 12/11/2024  Primary Diagnosis: COPD exacerbation (HCC) [J44.1]       Precautions:                        ASSESSMENT : This is a 61-year-old gentleman with history of COPD initially presented to [facility] with acute shortness of breath and respiratory distress on 12/5.  He transferred to [facility] on 12/7 for higher level of care.  Due to worsening respiratory failure he was transferred to the ICU and intubated on the day of admission to [facility], 12/7.  Intubated 12/7-extubated 12/9.  DEFICITS/IMPAIRMENTS:   The patient is limited by decreased functional mobility, strength, activity tolerance, endurance who would benefit from PT to address these impairments. Patient received on 13L via midflow with O2 sats >95% throughout evaluation. Patient educated on energy conservation, gradually increasing endurance through mobility, pursed lip breathing with  2  How much HELP from another person do you currently need... (If the patient hasn't done an activity recently, how much help from another person do you think they would need if they tried?) Total A Lot A Little None   1.  Turning from your back to your side while in a flat bed without using bedrails? []  1 []  2 [x]  3  []  4   2.  Moving from lying on your back to sitting on the side of a flat bed without using bedrails? []  1 []  2 [x]  3  []  4   3.  Moving to and from a bed to a chair (including a wheelchair)? []  1 []  2 [x]  3  []  4   4. Standing up from a chair using your arms (e.g. wheelchair or bedside chair)? []  1 []  2 [x]  3  []  4   5.  Walking in hospital room? []  1 []  2 [x]  3  []  4   6.  Climbing 3-5 steps with a railing? []  1 [x]  2 []  3  []  4     Raw Score: 17/24                            Cutoff score <=171,2,3 had higher odds of discharging home with home health or need of SNF/IPR.    1. Ameena Simon, Sandrita Myers, Brian Carlos, Lauren Manning, Yevgeniy Cummings, Kevin Simon.  Validity of the AM-PAC “6-Clicks” Inpatient Daily Activity and Basic Mobility Short Forms. Physical Therapy Mar 2014, [phone]; DOI: 10.2522/ptj.15153297  2. Christopher GREGORY, Deyanira REINOSO, Garth REINOSO, Zara REINOSO. Association of AM-PAC \"6-Clicks\" Basic Mobility and Daily Activity Scores With Discharge Destination. Phys Ther. 2021 Apr 4;101(4):zsmq344. doi: 10.1093/ptj/ycpd990. PMID: 66699139.  3. Nikki REINOSO, Violeta COTTER, Sindy S, Rhonda K, Sreekanth S. Activity Measure for Post-Acute Care \"6-Clicks\" Basic Mobility Scores Predict Discharge Destination After Acute Care Hospitalization in Select Patient Groups: A Retrospective, Observational Study. Arch Rehabil Res Clin Transl. 2022 Jul 16;4(3):163613. doi: 10.1016/j.arrct.2022.005599. PMID: 93435493; PMCID: LBO8021738.  4. Aurora SHETTY, Autumn S, Mallika W, Lesley P. AM-PAC Short Forms Manual 4.0. Revised 2/2020.

## 2024-12-11 NOTE — ICUWATCH
ICU Transfer/Accept Summary     This patient is being transferred OUT OF THE ICU  DATE OF TRANSFER: 12/11/2024       PATIENT ID: Jose Servin  MRN: [id number]   YOB: 1960    PRIMARY CARE PROVIDER: Sylvia Orozco APRN - NP   DATE OF ADMISSION: 12/7/2024  7:08 AM    ATTENDING PHYSICIAN: Ruth Meredith MD  CONSULTATIONS:   IP CONSULT TO ADDICTION MEDICINE    PROCEDURES/SURGERIES:   * No surgery found *    REASON FOR ADMISSION: COPD exacerbation (HCC)     HOSPITAL PROBLEM LIST:  Patient Active Problem List   Diagnosis    Hypertension    Lower urinary tract symptoms due to benign prostatic hyperplasia    Pulmonary emphysema (HCC)    Smoker    COPD exacerbation (HCC)             Brief HPI and Hospital Course:    This is a 61-year-old gentleman with history of COPD initially presented to [facility] with acute shortness of breath and respiratory distress on 12/5.  He transferred to [facility] on 12/7 for higher level of care.  Due to worsening respiratory failure he was transferred to the ICU and intubated on the day of admission to [facility], 12/7.  Intubated 12/7-extubated 12/9.  Being managed with supplemental oxygen and BiPAP at bedtime.  He went through opioid withdrawal, he is a daily heroin user, being followed by addiction medicine and is started on methadone.      12/11  - Patient deemed to be stable to transfer out of the ICU.   services assuming care of patient.  -During my exam patient sitting in a chair by the bedside on high flow oxygen 11 L/min satting 100%.  Nursing at the bedside, oxygen weaned down to 3 L via regular nasal cannula and his sats remained in the mid-upper 90s.  -Patient does not have any complaints, denies shortness of breath or subjective sense of difficulty breathing.        Assessment and Plan:  Acute on chronic hypoxic and hypercarbic respiratory failure due to COPD exacerbation triggered by acute rhinovirus infection.  -Intubated 12/7,  extubated 12/9.  -Currently weaned down to nasal cannula 3 L/min.  -Continue scheduled inhaled steroids, as needed bronchodilators, on prednisone which is being weaned down.  -Being set up for home AVAPS, appreciate Dr. Shah's help  -A 3 mm left upper lobe pulmonary nodule detected in March 2024, a yearly surveillance CT recommended    Tobacco use disorder  -She is extensively counseled to quit smoking, he seems eager to do so  -Started on bupropion 100 mg once daily for 3 days then increase to twice daily  - Nicotine patch 21 mg    Opioid use disorder, substance use disorder, daily heroin user, cocaine use disorder.  -Patient reportedly follows at [facility] and is on methadone 120 mg, outpatient dose resumed.  Appreciate Dr. Zaragoza's help    Volume overload, status post diuresis.  - Echocardiogram was normal LV and RV function.    BIRGIT, prolonged: Resolved  Mild hyponatremia, improving.  Hypertension: Continue home amlodipine, losartan added this admission    Thrombocytopenia, chronic: He will need outpatient follow-up.  LFTs are fairly unremarkable.  -Check hep C    Polycythemia, chronic likely due to tobacco use COPD        Full  Admission Status: Inpatient  Lovenox  Home w/Family  Cardiac monitoring: Intermediate care         Social Determinants   Substance abuse tobacco use disorder, opioid use disorder, cocaine use disorder.    PHYSICAL EXAMINATION:  I attest that I have personally performed below mentioned exam on 12/11/2024  BP (!) 165/136   Pulse 99   Temp 99 °F (37.2 °C) (Oral)   Resp 20   Wt 51 kg (112 lb 7 oz)   SpO2 99%   BMI 16.60 kg/m²     General:          Sitting in the chair by the bedside, on oxygen via nasal cannula, appears comfortable.    HEENT:           Atraumatic, MMM            Neck:               Supple, symmetrical,  thyroid: non tender  Lungs:             On oxygen via nasal cannula.  Not in distress.  Diminished air entry globally.  No wheezing   Heart:

## 2024-12-11 NOTE — PROGRESS NOTES
[facility] Progress Note        Jose Servin  1960  [id number]  12/11/2024      Assessment and plan:  Acute on chronic hypoxic and hypercapnic respiratory failure   -AECOPD + acute rhinovirus  -baseline co2 50-60  -Last use one day prior + rhinovirus   -intubated 12/7- extubated 12/9  -add bipap at hs -->  excellent candidate for trilogy at hs  -Ordering AVAPS-AE for chronic respiratory failure with hypercapnia due to OHS STANLEY.  -Pt needs auto-titrating feature to effectively decrease co2. He has severe hypercapnia  -he has persistent hypercapnia with bipap use so volume ventilation is now indicated   -home bipap will not be appropriate and could be dangerous.  Must be AVAPS-AE  -cont methylpred 40 daily and taper to 20 x 5 days, then to 10 mg daily x 10 days  -cont B/B nebs and albuterol prn   -cont o2 to keep spo2 > 87%       AECOPD: resolved  -cont b/b nebs  -duonebs to prn   -methylpred to 40 daily --->  taper ordered  -cont cef/azithro completes today     Volume overload:  post lasix for diuresis   No known cardiac history. Last TTE normal LV and RV function  - goal MAP>=65  - continue home amlodipine  - added losartan and increased to 50 daily   -check BNP this am --->  elevated but trend is down   -lasix 20 IV x 1      BIRGIT on CKD:  resolved.  Back to baseline cr    -cr trend is down.  Cr 1.65 from 1.8  - goal K>=4, Mg>=2, Phos >3  - daily BMP  - strict I/O's; daily weights  - avoid nephrotoxic medications     Polycythemia:   - - likely due to tobacco use/ COPD  -noted chronically since 2023  -better with IVF    - daily CBC     RVP positive for rhinovirus.      Discussed with hospitalist team, case management team, discussed with Family     HPI:  Improved.  Trouble tolerating bipap overnight.  Avaps-AE will be better mode.        ICU DAILY CHECKLIST     Code Status:full   DVT Prophylaxis:lovenox  T/L/D: PIV  SUP: dc PPI with change in steroid dose  Diet: regular  Activity

## 2024-12-11 NOTE — PROGRESS NOTES
Today's Date: 12/11/2024  Unit: [facility] 4 CV SERVICES UNIT     Received request from patient.  Upon review of chart and communication with care team, patient's decision making abilities are not in question.. Patient and Healthcare Decision Maker was/were present in the room during visit.     Goals of ACP Conversation:  Discuss advance care planning documents     Health Care Decision Makers:                  Primary Decision Maker: Julianna Palafox - Brother/Sister - [phone]    Secondary Decision Maker: Ester Del Angel - Niece/Nephew - [phone]  Summary:  Completed New Documents     Advance Care Planning Documents (Patient Wishes):  Healthcare Power of /Advance Directive Appointment of Health Care Agent  Living Will/Advance Directive  Anatomical Gift/Organ Donation      Assessment:   responded to room and facilitated an advance medical directive discussion with patient. Patient expressed that he is doing much better and has the necessary information to complete the document. Completed advanced medical directive, left the original with patient gave a copy with the primary decision maker and placed a copy on the chart for scanning.      Interventions:  Provided education on documents for clarity and greater understanding  Discussed and provided education on state decision maker hierarchy  Assisted in the completion of documents according to patient's wishes at this time     Care Preferences Communicated:              No     Outcomes/Plan:  ACP Discussion: Completed  New advance directive completed.  Returned original document(s) to patient, as well as copies for distribution to appointed agents  Copy of advance directive given to staff to scan into medical record.     Electronically signed by Chaplain Radu Resident on 12/11/2024 at 3:51 PM

## 2024-12-11 NOTE — PROGRESS NOTES
..Bedside shift change report given to Dianelys (oncoming nurse) by Nasreen (offgoing nurse). Report included the following information Nurse Handoff Report, Cardiac Rhythm nsr, Quality Measures, Neuro Assessment, and Event Log.      0900: RT @bedside. ABG collected and transitioned to 13L midflow  Arterial Blood Gas result:  pO2 58; pCO2 51.3; pH 7.50;  HCO3 40, %O2 Sat 91.3.     0925: PT/OT @bedside    1035: IPR @bedside. Plans to downgrade. Wean O2.    1045: Addiction medicine @bedside.    1100: PRN Hydralazine given for systolic >160. Weaned to 11L.  1105: PRN Zofran given     1110: Hospitalist @bedside -- transitioned to 3L NC.  1200: 2L NC  1440: Room air-- 94-96%.    1330: Hep C sent to lab    1415: Report given to T[facility]REL Wick. [facility]     1450:  @bedside to complete mPOA paperwork.    1535: Transferred to [id number], care assumed by TYREL Wick at this time.

## 2024-12-11 NOTE — CARE COORDINATION
Care Management Initial Assessment       RUR: 14 %   Readmission? No     12/11/24 0937   Service Assessment   Patient Orientation Alert and Oriented;Person;Place;Situation;Self   Cognition Alert   History Provided By Patient   Primary Caregiver Self   Support Systems Spouse/Significant Other  (Wife Jyoti Servin [phone] () Sister Julianna [place])   Patient's Healthcare Decision Maker is: Legal Next of Kin   PCP Verified by CM Yes  (Dr Sylvia Orozco)   Last Visit to PCP Within last 6 months   Prior Functional Level Independent in ADLs/IADLs   Current Functional Level Assistance with the following:;Bathing;Dressing;Toileting;Mobility   Can patient return to prior living arrangement Unknown at present   Ability to make needs known: Good   Family able to assist with home care needs: Other (comment)  (unknown)   Would you like for me to discuss the discharge plan with any other family members/significant others, and if so, who? No   Community Resources None   Social/Functional History   Lives With Alone   Home Layout Two level   Home Access Stairs to enter with rails   Entrance Stairs - Number of Steps 1   Entrance Stairs - Rails Left   Bathroom Equipment None   Home Equipment Oxygen  (unknown provider)   Receives Help From Family   Prior Level of Assist for ADLs Independent   Prior Level of Assist for Homemaking Independent   Ambulation Assistance Independent   Prior Level of Assist for Transfers Independent   Active  Yes   Mode of Transportation Car   Occupation Part time employment   Type of Occupation Delivers Duct work   Discharge Planning   Living Arrangements Alone   Current Services Prior To Admission Oxygen Therapy   Potential Assistance Purchasing Medications No   One/Two Story Residence Two story   # of Interior Steps 20   Lift Chair Available No   History of falls? 0     Patient presented to [facility] with cough, SOB and wheezes. Patient is on home O2, however does not recall the name of the

## 2024-12-11 NOTE — PROGRESS NOTES
Cc: OUD and tobacco use disorder    Assessment/Plan:    64 year old male with admitted with COPD exacerbation complicated by ongoing tobacco use disorder, opioid use disorder, and cocaine use.     Encouraged him to focus on quitting cigarettes.  Previously failed treatment with varenicline (bad dreams).  He is open to NRT and to bupropion for smoking cessation.     TUD:  -start buproprion 150 mg once daily x3 days then increase to twice daily.   -start nicotine patch 21 mg    OUD:  much 30 minute visit spent on counseling related to this.  - increase methadone to 120 mg daily (his prior outpatient dose).    - he is interested in an intensive outpatient program - this would allow him to maintain employment which going to a residential rehab would not allow him to do.    - [facility] is one option ([email or web address] ([facility]: [place]))  - [facility] center for addiction medicine is another option.   - there are more options.      Cocaine use disorder: discussed impact of cocaine use on copd and encouraged him to quit.        Thank you for this consult.       Max Zaragoza MD [facility]  [facility]  [phone]  Fax [phone]    Subjective:    Reports feeling better in the last 24 hours, says after taking methadone his anxiety lessened dramatically.  He received 60 mg yesterday and another 60 mg this morning.        Objective:     BP (!) 165/136   Pulse 99   Temp 99 °F (37.2 °C) (Oral)   Resp 20   Wt 51 kg (112 lb 7 oz)   SpO2 99%   BMI 16.60 kg/m²     Alert and oriented  No distress  Non labored respirations  Pupils equal round and reactive to light.  Abdomen soft  No edema in legs  No tract marks on skin

## 2024-12-12 ENCOUNTER — APPOINTMENT (OUTPATIENT)
Facility: HOSPITAL | Age: 64
End: 2024-12-12
Attending: FAMILY MEDICINE
Payer: COMMERCIAL

## 2024-12-12 LAB
ANION GAP SERPL CALC-SCNC: 2 MMOL/L (ref 2–12)
BASOPHILS # BLD: 0 K/UL (ref 0–0.1)
BASOPHILS NFR BLD: 0 % (ref 0–1)
BUN SERPL-MCNC: 40 MG/DL (ref 6–20)
BUN/CREAT SERPL: 31 (ref 12–20)
CALCIUM SERPL-MCNC: 9.1 MG/DL (ref 8.5–10.1)
CHLORIDE SERPL-SCNC: 95 MMOL/L (ref 97–108)
CO2 SERPL-SCNC: 35 MMOL/L (ref 21–32)
CREAT SERPL-MCNC: 1.29 MG/DL (ref 0.7–1.3)
DIFFERENTIAL METHOD BLD: ABNORMAL
EOSINOPHIL # BLD: 0 K/UL (ref 0–0.4)
EOSINOPHIL NFR BLD: 0 % (ref 0–7)
ERYTHROCYTE [DISTWIDTH] IN BLOOD BY AUTOMATED COUNT: 14.6 % (ref 11.5–14.5)
GLUCOSE SERPL-MCNC: 102 MG/DL (ref 65–100)
HCT VFR BLD AUTO: 56.7 % (ref 36.6–50.3)
HGB BLD-MCNC: 17.3 G/DL (ref 12.1–17)
IMM GRANULOCYTES # BLD AUTO: 0 K/UL (ref 0–0.04)
IMM GRANULOCYTES NFR BLD AUTO: 0 % (ref 0–0.5)
LYMPHOCYTES # BLD: 1.6 K/UL (ref 0.8–3.5)
LYMPHOCYTES NFR BLD: 22 % (ref 12–49)
MCH RBC QN AUTO: 27.3 PG (ref 26–34)
MCHC RBC AUTO-ENTMCNC: 30.5 G/DL (ref 30–36.5)
MCV RBC AUTO: 89.6 FL (ref 80–99)
MONOCYTES # BLD: 1 K/UL (ref 0–1)
MONOCYTES NFR BLD: 14 % (ref 5–13)
NEUTS SEG # BLD: 4.5 K/UL (ref 1.8–8)
NEUTS SEG NFR BLD: 63 % (ref 32–75)
NRBC # BLD: 0 K/UL (ref 0–0.01)
NRBC BLD-RTO: 0 PER 100 WBC
PLATELET # BLD AUTO: 108 K/UL (ref 150–400)
PMV BLD AUTO: 11.2 FL (ref 8.9–12.9)
POTASSIUM SERPL-SCNC: 4.8 MMOL/L (ref 3.5–5.1)
RBC # BLD AUTO: 6.33 M/UL (ref 4.1–5.7)
SODIUM SERPL-SCNC: 132 MMOL/L (ref 136–145)
WBC # BLD AUTO: 7.2 K/UL (ref 4.1–11.1)

## 2024-12-12 PROCEDURE — 94760 N-INVAS EAR/PLS OXIMETRY 1: CPT

## 2024-12-12 PROCEDURE — 80048 BASIC METABOLIC PNL TOTAL CA: CPT

## 2024-12-12 PROCEDURE — 6360000002 HC RX W HCPCS: Performed by: PHYSICIAN ASSISTANT

## 2024-12-12 PROCEDURE — 70450 CT HEAD/BRAIN W/O DYE: CPT

## 2024-12-12 PROCEDURE — 6360000002 HC RX W HCPCS: Performed by: NURSE PRACTITIONER

## 2024-12-12 PROCEDURE — 94660 CPAP INITIATION&MGMT: CPT

## 2024-12-12 PROCEDURE — 6370000000 HC RX 637 (ALT 250 FOR IP): Performed by: INTERNAL MEDICINE

## 2024-12-12 PROCEDURE — 94010 BREATHING CAPACITY TEST: CPT

## 2024-12-12 PROCEDURE — 94640 AIRWAY INHALATION TREATMENT: CPT

## 2024-12-12 PROCEDURE — 2060000000 HC ICU INTERMEDIATE R&B

## 2024-12-12 PROCEDURE — 6370000000 HC RX 637 (ALT 250 FOR IP): Performed by: NURSE PRACTITIONER

## 2024-12-12 PROCEDURE — 97116 GAIT TRAINING THERAPY: CPT

## 2024-12-12 PROCEDURE — 2700000000 HC OXYGEN THERAPY PER DAY

## 2024-12-12 PROCEDURE — 97110 THERAPEUTIC EXERCISES: CPT

## 2024-12-12 PROCEDURE — 85025 COMPLETE CBC W/AUTO DIFF WBC: CPT

## 2024-12-12 PROCEDURE — 2580000003 HC RX 258: Performed by: NURSE PRACTITIONER

## 2024-12-12 PROCEDURE — 36415 COLL VENOUS BLD VENIPUNCTURE: CPT

## 2024-12-12 RX ADMIN — PREDNISONE 20 MG: 20 TABLET ORAL at 08:51

## 2024-12-12 RX ADMIN — TRAZODONE HYDROCHLORIDE 100 MG: 100 TABLET ORAL at 21:30

## 2024-12-12 RX ADMIN — BUPROPION HYDROCHLORIDE 150 MG: 100 TABLET, FILM COATED ORAL at 08:51

## 2024-12-12 RX ADMIN — ARFORMOTEROL TARTRATE: 15 SOLUTION RESPIRATORY (INHALATION) at 19:17

## 2024-12-12 RX ADMIN — Medication 100 MG: at 08:51

## 2024-12-12 RX ADMIN — LOSARTAN POTASSIUM 100 MG: 50 TABLET, FILM COATED ORAL at 08:52

## 2024-12-12 RX ADMIN — SODIUM CHLORIDE, PRESERVATIVE FREE 10 ML: 5 INJECTION INTRAVENOUS at 21:30

## 2024-12-12 RX ADMIN — AMLODIPINE BESYLATE 10 MG: 5 TABLET ORAL at 08:52

## 2024-12-12 RX ADMIN — ENOXAPARIN SODIUM 40 MG: 100 INJECTION SUBCUTANEOUS at 08:52

## 2024-12-12 RX ADMIN — THERA TABS 1 TABLET: TAB at 08:51

## 2024-12-12 RX ADMIN — Medication 120 MG: at 08:51

## 2024-12-12 RX ADMIN — SODIUM CHLORIDE, PRESERVATIVE FREE 10 ML: 5 INJECTION INTRAVENOUS at 08:52

## 2024-12-12 ASSESSMENT — PULMONARY FUNCTION TESTS
POST BRONCHODILATOR FEV1/FVC: 39
PIF_VALUE: 50
FEV1 (%PREDICTED): 54

## 2024-12-12 ASSESSMENT — COPD QUESTIONNAIRES
GOLD_GROUP: GROUP E
QUESTION8_ENERGYLEVEL: 0
QUESTION5_HOMEACTIVITIES: 1
GOLD_GRADE: 2
TOTAL_EXACERBATIONS_PASTYEAR: 4
QUESTION2_CHESTPHLEGM: 3
CAT_TOTALSCORE: 11
QUESTION7_SLEEPQUALITY: 0
QUESTION6_LEAVINGHOUSE: 1
QUESTION3_CHESTTIGHTNESS: 3
QUESTION4_WALKINCLINE: 1
QUESTION1_COUGHFREQUENCY: 2

## 2024-12-12 ASSESSMENT — PAIN SCALES - GENERAL
PAINLEVEL_OUTOF10: 0

## 2024-12-12 NOTE — PROGRESS NOTES
POST FALL MANAGEMENT    Jose Servin  MEDICAL RECORD NUMBER:  [id number]  AGE: 64 y.o.   GENDER: male  : 1960  TODAYS DATE:  2024    Details     Fall Occurred: Yes    Was the Fall Witnessed:  No      Brief Review of Event: Pts bed alarm was attached to call bell. Call bell went off. Another staff member answered call bell. Pt stated he had fallen. Staff entered room to pt on floor.         Who found the patient: TYREL Vila      Where was the patient at the time of the fall: in bed      Patient Comments: stated that he didn't call. Was trying to use the urinal.    Date Fall Occurred:  2024 .       Time Fall Occurred: 6:11a.m.     Assessment     Post Fall Head to Toe Assessment Completed: Yes    Post Fall Predictive Analytic Score Reviewed: Yes     Post Fall Vitals Completed: Yes    Post Fall Neuro Checks Completed: Yes    Injury Occurred(if yes, describe injury):  no           Did the Patient Experience:(Check Osmar all that apply)    [] Patient hit head  [] Loss of consciousness  [] Change in mental status following the fall  [x] Patient is on an anticoagulant medication      CT Performed:  yes    Follow-up     Persons Notified of Fall:  (Provide names of persons notified)   [x] Physician: Carrol Lackey NP  [] SHERI:  [x] Nursing Supervisior: Cecilia  [] Manager:  [] Pharmacist:  [] Family:  [] Other:      Electronically signed by Lynette Quiroz RN 2024 at 6:19 AM

## 2024-12-12 NOTE — PROGRESS NOTES
Occupational Therapy   12.12.2024    Chart reviewed in prep for OT treatment, patient received in bed and requesting to defer therapy this morning. Noted patient SPO2 dropped to 84% with just talking lying in bed on 4L, once patient stopped talking, SPO2 back to 92%. Patient left sitting upright in bed. Will follow up as able and medically appropriate. Thank you.     Loli Bae MS, OTR/L

## 2024-12-12 NOTE — PROGRESS NOTES
Transportation Needs (12/9/2024)    PRAPARE - Transportation     Lack of Transportation (Medical): No     Lack of Transportation (Non-Medical): No   Physical Activity: Not on file   Stress: Not on file   Social Connections: Not on file   Intimate Partner Violence: Not on file   Depression: Not at risk (3/6/2024)    PHQ-2     PHQ-2 Score: 0   Housing Stability: Low Risk  (12/9/2024)    Housing Stability Vital Sign     Unable to Pay for Housing in the Last Year: No     Number of Times Moved in the Last Year: 0     Homeless in the Last Year: No   Interpersonal Safety: Not At Risk (12/9/2024)    Interpersonal Safety Domain Source: IP Abuse Screening     Physical abuse: Denies     Verbal abuse: Denies     Emotional abuse: Denies     Financial abuse: Denies     Sexual abuse: Denies   Utilities: Not At Risk (12/9/2024)    [facility] Utilities     Threatened with loss of utilities: No       Review of Systems:   Pertinent items are noted in HPI.       Vital Signs:    Last 24hrs VS reviewed since prior progress note. Most recent are:  Vitals:    12/12/24 1619   BP: 122/63   Pulse: 93   Resp: 20   Temp: 98.6 °F (37 °C)   SpO2: 100%         Intake/Output Summary (Last 24 hours) at 12/12/2024 1811  Last data filed at 12/12/2024 1623  Gross per 24 hour   Intake --   Output 400 ml   Net -400 ml        Physical Examination:     I had a face to face encounter with this patient and independently examined them on 12/12/2024 as outlined below:          General : alert x 3, awake, no acute distress,   HEENT: PEERL, EOMI, moist mucus membrane  Neck: supple, no JVD, no meningeal signs  Chest: Clear to auscultation bilaterally diminished breath sounds throughout, no wheezing,  CVS: S1 S2 heard, RRR  Abd: soft/ non tender, non distended, BS physiological,   Ext: no clubbing, no cyanosis, no edema,   Neuro/Psych: pleasant mood and affect, CN 2-12 grossly intact, Strength 5/5 in all extremities, Skin: warm     Data Review:    Review and/or order of  5                 Kori Finn MD

## 2024-12-12 NOTE — PLAN OF CARE
Problem: Discharge Planning  Goal: Discharge to home or other facility with appropriate resources  Outcome: Progressing  Flowsheets (Taken 12/11/2024 2109)  Discharge to home or other facility with appropriate resources:   Identify barriers to discharge with patient and caregiver   Arrange for needed discharge resources and transportation as appropriate   Identify discharge learning needs (meds, wound care, etc)   Refer to discharge planning if patient needs post-hospital services based on physician order or complex needs related to functional status, cognitive ability or social support system     Problem: Pain  Goal: Verbalizes/displays adequate comfort level or baseline comfort level  12/12/2024 0342 by Lynette Quiroz RN  Outcome: Progressing  12/11/2024 1611 by Shamika Alan RN  Outcome: Progressing     Problem: Safety - Adult  Goal: Free from fall injury  12/12/2024 0342 by Lynette Quiroz RN  Outcome: Progressing  12/11/2024 1611 by Shamika Alan RN  Outcome: Progressing     Problem: ABCDS Injury Assessment  Goal: Absence of physical injury  Outcome: Progressing     Problem: Safety - Medical Restraint  Goal: Remains free of injury from restraints (Restraint for Interference with Medical Device)  Description: INTERVENTIONS:  1. Determine that other, less restrictive measures have been tried or would not be effective before applying the restraint  2. Evaluate the patient's condition at the time of restraint application  3. Inform patient/family regarding the reason for restraint  4. Q2H: Monitor safety, psychosocial status, comfort, nutrition and hydration  Outcome: Progressing     Problem: Skin/Tissue Integrity  Goal: Absence of new skin breakdown  Description: 1.  Monitor for areas of redness and/or skin breakdown  2.  Assess vascular access sites hourly  3.  Every 4-6 hours minimum:  Change oxygen saturation probe site  4.  Every 4-6 hours:  If on nasal continuous positive airway pressure, respiratory

## 2024-12-12 NOTE — PROGRESS NOTES
about a pack of cigarettes per day and has never used smokeless tobacco. He has experienced multiple failed attempts to quit smoking, including a 9-year period of abstinence, which he attributes to being incarcerated. He also tried Chantix but discontinued it due to vivid, overwhelming dreams, which led his primary care physician to stop the medication. Currently, he is using a nicotine patch while in the hospital. When asked how he feels, he expressed that he feels \"okay,\" but the real challenge will be after leaving the hospital.    Prior PFTs: Yes - (FEV1-FVC- 39, FEV1 % predicted- 54).  Moderate obstruction    Prior vaccination: The patient has been vaccinated for COVID-19 but does not receive the flu shot. He also does not recall whether he has the pneumococcal pneumonia vaccination.    Current pulmonologist:  Yes - previously seen by Dr. Brothers who has retired. Follow-up appointment scheduled on:     12/18/2024  2:15 PM (Check in @ 2:00)  Ekta Noriega NP        Current Pharmacological Treatment:     Current treatment includes:Advair, Spiriva, and albuterol.   ____________________________________________________________________________     GOLD Stage:  Gold rdGrdrrdarddrderd:rd rd3rd Group: Group E  Current eosinophil count: 0  Recorded domestic exacerbations past 12 months: 4        Observed PIF: 50 L/min    Combination  ICS-LABA Inhaler Acceptable   Therapy  Device For Use   Salmeterol/fluticasone Advair  Diskus Yes   Vilanterol/fluticasone  Breo  Ellipta Yes   Formoterol/mometasone  Dulera MDI Yes   Formoterol/budesonide  Symbicort MDI Yes   Triple Therapy Recommended (For Group E) [id number]     Fluticasone/umeclidinium/vilanterol  Trelegy  Ellipta Yes   Budesonide/glycopyrrolate/formoterol fumarate  Breztri  MDI Yes   Recommended (For Group A & B) LAMA/LABA     Vilanterol/umeclidinium  Anoro  Ellipta Yes   Olodaterol/tiotropium  Stiolto  Respimat Yes   Formoterol/glycopyrronium  Bevespi  MDI Yes    Aclidinium/formoterol Duaklir  Pressair Yes     *Nebulizer Options    LAMA LABA ICS   Yupeemmanueli  Brotrevon Budesonide    Performist      The CAT provides a reliable measure of the impact of COPD on a patient's health status. Range of CAT scores from 0-40. Higher scores denote a more severe impact of COPD on a patient’s life. Scores <10 have a low impact, 10-20 medium, 21-30 high and >30 very high impact, requiring gradually more interventions.     Current recorded COPD Assessment Tool (CAT) score of     Cough Assessment: 2  Phlegm Assessment: 3  Chest tightness: 3  Walking on an incline: 1  Home Activities: 1  Confident Leaving The Home: 1  Sleeping Soundly: 0  Have Energy: 0  Assessment Score: 11       None pharmacological recommendations:     [x] ABG- 24-48hrs prior to discharge  [x] 6MW- to assess functional capacity  [] Sleep study  [] NIV  [x] Pulmonary Rehab  [] PFT on discharge  [x] Smoking Cessation- was given information on free program [facility]  [x]Pulmonary Consult  []Palliative Care Consult  []Low Dose Lung CT (LDCT)      Patient Education:      Understanding COPD  Exacerbations/Action Plan  Triggers  Precautions  Smoking Cessation  Inhaler/Nebulizer use (Spacer)  Incentive Spirometry  Breathing Techniques  Managing Stress  Educational resources provided: “Managing Your COPD\" Booklet.      Comments:     Mr. Servin is currently using Advair and Symbicort for maintenance inhalation therapy, which is appropriate. The only recommendation is to consider switching to a single inhaler that provides triple therapy ([id number]), as a single daily inhaler may improve patient compliance.      Electronically signed by RT ISAK on 12/12/24 at 11:16 AM EST

## 2024-12-12 NOTE — PLAN OF CARE
Problem: Physical Therapy - Adult  Goal: By Discharge: Performs mobility at highest level of function for planned discharge setting.  See evaluation for individualized goals.  Description: FUNCTIONAL STATUS PRIOR TO ADMISSION: Patient was independent and active without use of DME.    HOME SUPPORT PRIOR TO ADMISSION: The patient lived alone with family to provide assistance.    Physical Therapy Goals  Initiated 12/11/2024  1.  Patient will move from supine to sit and sit to supine in bed with independence within 7 day(s).    2.  Patient will perform sit to stand with supervision/set-up within 7 day(s).  3.  Patient will transfer from bed to chair and chair to bed with supervision/set-up using the least restrictive device within 7 day(s).  4.  Patient will ambulate with supervision/set-up for 120 feet with the least restrictive device within 7 day(s).   5.  Patient will ascend/descend 3 stairs with one handrail(s) with supervision/set-up within 7 day(s).   Outcome: Progressing   PHYSICAL THERAPY TREATMENT    Patient: Jose Servin (64 y.o. male)  Date: 12/12/2024  Diagnosis: COPD exacerbation (HCC) [J44.1] COPD exacerbation (HCC)      Precautions:                        ASSESSMENT:  Patient continues to benefit from skilled PT services and is progressing towards goals. Patient received seated in chair on 5 LPM O2 via NC, alert, and agreeable to PT. Virtual sitter now present in room as patient with unwitnessed fall this morning while attempting to use urinal in standing. Improving functional strength and activity tolerance noted as patient increased ambulation distances with CGA-min A x 1 and with chair follow for safety. Given mild-moderate gait instability without AD, SPC provided and gait stability improved. SpO2 >89% on 6 LPM O2 with activity, mild HAYWARD demonstrated. Educated patient on PLB, activity pacing, and use of IS/ flutter valve with good understanding demonstrated. Patient will benefit from  intermittent physical therapy up to 2-3x/week in previous living setting with additional support needed for fall prevention.       PLAN:  Patient continues to benefit from skilled intervention to address the above impairments.  Continue treatment per established plan of care.    Recommendations for staff mobility and toileting assistance:  Recommend that staff completes patient mobility with assist x1 using gait belt and cane.    Recommend for next PT session: stair training, further progression of gait with existing device, and monitor O2 saturations during activty    Recommendation for discharge: (in order for the patient to meet his/her long term goals):   Intermittent physical therapy up to 2-3x/week in previous living setting with additional support needed for fall prevention     Other factors to consider for discharge: high risk for falls, not safe to be alone, and concern for safely navigating or managing the home environment    IF patient discharges home will need the following DME: straight cane       SUBJECTIVE:   Patient stated, \"I like this cane.\"    OBJECTIVE DATA SUMMARY:   Critical Behavior:  Orientation  Overall Orientation Status: Within Normal Limits  Orientation Level: Oriented X4  Cognition  Overall Cognitive Status: Exceptions  Arousal/Alertness: Appears intact  Following Commands: Appears intact  Attention Span: Difficulty attending to directions;Attends with cues to redirect  Memory: Appears intact  Safety Judgement: Decreased awareness of need for safety;Decreased awareness of need for assistance  Problem Solving: Decreased awareness of errors;Assistance required to correct errors made  Insights: Decreased awareness of deficits  Initiation: Does not require cues  Sequencing: Requires cues for some    Functional Mobility Training:  Bed Mobility:  Bed Mobility Training  Bed Mobility Training: No  Transfers:  Transfer Training  Transfer Training: Yes  Sit to Stand: Contact-guard

## 2024-12-13 LAB
ANION GAP SERPL CALC-SCNC: 1 MMOL/L (ref 2–12)
BASOPHILS # BLD: 0 K/UL (ref 0–0.1)
BASOPHILS NFR BLD: 0 % (ref 0–1)
BUN SERPL-MCNC: 47 MG/DL (ref 6–20)
BUN/CREAT SERPL: 34 (ref 12–20)
CALCIUM SERPL-MCNC: 9.2 MG/DL (ref 8.5–10.1)
CHLORIDE SERPL-SCNC: 102 MMOL/L (ref 97–108)
CO2 SERPL-SCNC: 36 MMOL/L (ref 21–32)
CREAT SERPL-MCNC: 1.38 MG/DL (ref 0.7–1.3)
DIFFERENTIAL METHOD BLD: ABNORMAL
EOSINOPHIL # BLD: 0.2 K/UL (ref 0–0.4)
EOSINOPHIL NFR BLD: 5 % (ref 0–7)
ERYTHROCYTE [DISTWIDTH] IN BLOOD BY AUTOMATED COUNT: 14.5 % (ref 11.5–14.5)
GLUCOSE SERPL-MCNC: 107 MG/DL (ref 65–100)
HCT VFR BLD AUTO: 56.1 % (ref 36.6–50.3)
HGB BLD-MCNC: 16.4 G/DL (ref 12.1–17)
IMM GRANULOCYTES # BLD AUTO: 0 K/UL (ref 0–0.04)
IMM GRANULOCYTES NFR BLD AUTO: 1 % (ref 0–0.5)
LYMPHOCYTES # BLD: 1.1 K/UL (ref 0.8–3.5)
LYMPHOCYTES NFR BLD: 21 % (ref 12–49)
MCH RBC QN AUTO: 27.3 PG (ref 26–34)
MCHC RBC AUTO-ENTMCNC: 29.2 G/DL (ref 30–36.5)
MCV RBC AUTO: 93.5 FL (ref 80–99)
MONOCYTES # BLD: 0.9 K/UL (ref 0–1)
MONOCYTES NFR BLD: 18 % (ref 5–13)
NEUTS SEG # BLD: 2.9 K/UL (ref 1.8–8)
NEUTS SEG NFR BLD: 56 % (ref 32–75)
NRBC # BLD: 0 K/UL (ref 0–0.01)
NRBC BLD-RTO: 0 PER 100 WBC
PLATELET # BLD AUTO: 109 K/UL (ref 150–400)
PMV BLD AUTO: 11.2 FL (ref 8.9–12.9)
POTASSIUM SERPL-SCNC: 4.3 MMOL/L (ref 3.5–5.1)
RBC # BLD AUTO: 6 M/UL (ref 4.1–5.7)
SODIUM SERPL-SCNC: 139 MMOL/L (ref 136–145)
WBC # BLD AUTO: 5.2 K/UL (ref 4.1–11.1)

## 2024-12-13 PROCEDURE — 2060000000 HC ICU INTERMEDIATE R&B

## 2024-12-13 PROCEDURE — 2580000003 HC RX 258: Performed by: NURSE PRACTITIONER

## 2024-12-13 PROCEDURE — 80048 BASIC METABOLIC PNL TOTAL CA: CPT

## 2024-12-13 PROCEDURE — 94640 AIRWAY INHALATION TREATMENT: CPT

## 2024-12-13 PROCEDURE — 94760 N-INVAS EAR/PLS OXIMETRY 1: CPT

## 2024-12-13 PROCEDURE — 94660 CPAP INITIATION&MGMT: CPT

## 2024-12-13 PROCEDURE — 6370000000 HC RX 637 (ALT 250 FOR IP): Performed by: NURSE PRACTITIONER

## 2024-12-13 PROCEDURE — 85025 COMPLETE CBC W/AUTO DIFF WBC: CPT

## 2024-12-13 PROCEDURE — 6360000002 HC RX W HCPCS: Performed by: NURSE PRACTITIONER

## 2024-12-13 PROCEDURE — 6360000002 HC RX W HCPCS: Performed by: PHYSICIAN ASSISTANT

## 2024-12-13 PROCEDURE — 2700000000 HC OXYGEN THERAPY PER DAY

## 2024-12-13 PROCEDURE — 36415 COLL VENOUS BLD VENIPUNCTURE: CPT

## 2024-12-13 PROCEDURE — 6370000000 HC RX 637 (ALT 250 FOR IP): Performed by: INTERNAL MEDICINE

## 2024-12-13 RX ORDER — BUPROPION HYDROCHLORIDE 150 MG/1
150 TABLET, EXTENDED RELEASE ORAL 2 TIMES DAILY
Status: DISCONTINUED | OUTPATIENT
Start: 2024-12-14 | End: 2024-12-14 | Stop reason: HOSPADM

## 2024-12-13 RX ORDER — BUPROPION HYDROCHLORIDE 100 MG/1
100 TABLET ORAL 2 TIMES DAILY
Status: DISCONTINUED | OUTPATIENT
Start: 2024-12-14 | End: 2024-12-13

## 2024-12-13 RX ADMIN — ACETAMINOPHEN 650 MG: 325 TABLET ORAL at 08:09

## 2024-12-13 RX ADMIN — AMLODIPINE BESYLATE 10 MG: 5 TABLET ORAL at 08:09

## 2024-12-13 RX ADMIN — ARFORMOTEROL TARTRATE: 15 SOLUTION RESPIRATORY (INHALATION) at 19:55

## 2024-12-13 RX ADMIN — BUPROPION HYDROCHLORIDE 150 MG: 100 TABLET, FILM COATED ORAL at 08:09

## 2024-12-13 RX ADMIN — SODIUM CHLORIDE, PRESERVATIVE FREE 10 ML: 5 INJECTION INTRAVENOUS at 22:40

## 2024-12-13 RX ADMIN — THERA TABS 1 TABLET: TAB at 08:09

## 2024-12-13 RX ADMIN — TRAZODONE HYDROCHLORIDE 100 MG: 100 TABLET ORAL at 22:40

## 2024-12-13 RX ADMIN — SODIUM CHLORIDE, PRESERVATIVE FREE 10 ML: 5 INJECTION INTRAVENOUS at 08:07

## 2024-12-13 RX ADMIN — LOSARTAN POTASSIUM 100 MG: 50 TABLET, FILM COATED ORAL at 08:09

## 2024-12-13 RX ADMIN — PREDNISONE 20 MG: 20 TABLET ORAL at 08:09

## 2024-12-13 RX ADMIN — Medication 120 MG: at 08:08

## 2024-12-13 RX ADMIN — ARFORMOTEROL TARTRATE: 15 SOLUTION RESPIRATORY (INHALATION) at 07:47

## 2024-12-13 RX ADMIN — ENOXAPARIN SODIUM 40 MG: 100 INJECTION SUBCUTANEOUS at 08:10

## 2024-12-13 RX ADMIN — Medication 100 MG: at 08:09

## 2024-12-13 ASSESSMENT — PAIN DESCRIPTION - ORIENTATION: ORIENTATION: POSTERIOR

## 2024-12-13 ASSESSMENT — PAIN DESCRIPTION - DESCRIPTORS: DESCRIPTORS: ACHING

## 2024-12-13 ASSESSMENT — PAIN SCALES - GENERAL
PAINLEVEL_OUTOF10: 3
PAINLEVEL_OUTOF10: 5
PAINLEVEL_OUTOF10: 3

## 2024-12-13 ASSESSMENT — PAIN DESCRIPTION - LOCATION: LOCATION: NECK

## 2024-12-13 NOTE — PROGRESS NOTES
[facility] Adult  Hospitalist Group                                                                                          Hospitalist Progress Note  Kori Finn MD  Answering service: [phone] OR [phone] from in house phone        Date of Service:  2024  NAME:  Jose eSrvin  :  1960  MRN:  [id number]       Admission Summary:   This is a 61-year-old gentleman with history of COPD initially presented to [facility] with acute shortness of breath and respiratory distress on .  He transferred to [facility] on  for higher level of care.  Due to worsening respiratory failure he was transferred to the ICU and intubated on the day of admission to [facility], .  Intubated -extubated .  Being managed with supplemental oxygen and BiPAP at bedtime.  He went through opioid withdrawal, he is a daily heroin user, being followed by addiction medicine and is started on methadone.          - Patient deemed to be stable to transfer out of the ICU.   services assuming care of patient.  -During my exam patient sitting in a chair by the bedside on high flow oxygen 11 L/min satting 100%.  Nursing at the bedside, oxygen weaned down to 3 L via regular nasal cannula and his sats remained in the mid-upper 90s.  -Patient does not have any complaints, denies shortness of breath or subjective sense of difficulty breathing.       Interval history / Subjective:   Patient seen and examined.  He feels much better.  Excellent appetite, asking for double portions.  Breathing at baseline.  Patient has home oxygen, on 2 L nasal cannula at baseline.  He denies any other complaints.     Assessment & Plan:        Acute on chronic hypoxic and hypercarbic respiratory failure due to COPD exacerbation triggered by acute rhinovirus infection.  -Intubated , extubated .  -Currently weaned down to nasal cannula 3 L/min.  -Continue scheduled inhaled steroids, as needed bronchodilators,  noted.        Medications Reviewed:     Current Facility-Administered Medications   Medication Dose Route Frequency    hydrALAZINE (APRESOLINE) injection 20 mg  20 mg IntraVENous Q4H PRN    labetalol (NORMODYNE;TRANDATE) injection 10 mg  10 mg IntraVENous Q4H PRN    losartan (COZAAR) tablet 100 mg  100 mg Oral Daily    predniSONE (DELTASONE) tablet 20 mg  20 mg Oral Daily    Followed by    [START ON 12/17/2024] predniSONE (DELTASONE) tablet 10 mg  10 mg Oral Daily    nicotine (NICODERM CQ) 21 MG/24HR 1 patch  1 patch TransDERmal Daily    methadone solution 1 mg/mL 120 mg  120 mg Oral Daily    buPROPion (WELLBUTRIN) tablet 150 mg  150 mg Oral Daily    traZODone (DESYREL) tablet 100 mg  100 mg Oral Nightly    traMADol (ULTRAM) tablet 50 mg  50 mg Oral Q6H PRN    ALPRAZolam (XANAX) tablet 1 mg  1 mg Oral TID PRN    thiamine tablet 100 mg  100 mg Oral Daily    multivitamin 1 tablet  1 tablet Oral Daily    ipratropium 0.5 mg-albuterol 2.5 mg (DUONEB) nebulizer solution 1 Dose  1 Dose Inhalation Q6H PRN    arformoterol 15 mcg-budesonide 0.5 mg neb solution   Nebulization BID RT    melatonin tablet 3 mg  3 mg Oral Nightly PRN    sodium chloride flush 0.9 % injection 5-40 mL  5-40 mL IntraVENous 2 times per day    sodium chloride flush 0.9 % injection 5-40 mL  5-40 mL IntraVENous PRN    0.9 % sodium chloride infusion   IntraVENous PRN    enoxaparin (LOVENOX) injection 40 mg  40 mg SubCUTAneous Daily    ondansetron (ZOFRAN-ODT) disintegrating tablet 4 mg  4 mg Oral Q8H PRN    Or    ondansetron (ZOFRAN) injection 4 mg  4 mg IntraVENous Q6H PRN    polyethylene glycol (GLYCOLAX) packet 17 g  17 g Oral Daily PRN    acetaminophen (TYLENOL) tablet 650 mg  650 mg Oral Q6H PRN    Or    acetaminophen (TYLENOL) suppository 650 mg  650 mg Rectal Q6H PRN    amLODIPine (NORVASC) tablet 10 mg  10 mg Oral Daily     ______________________________________________________________________  EXPECTED LENGTH OF STAY: Unable to retrieve estimated

## 2024-12-13 NOTE — PROGRESS NOTES
0730: Bedside and Verbal shift change report given to TYREL Randolph (oncoming nurse) by TYREL Ojeda (offgoing nurse). Report included the following information Nurse Handoff Report, Index, Adult Overview, MAR, Recent Results, and Cardiac Rhythm NSR / ST .     1930: Bedside and Verbal shift change report given to TYREL Bhandari (oncoming nurse) by TYREL Randolph (offgoing nurse). Report included the following information Nurse Handoff Report, Index, Adult Overview, MAR, Recent Results, and Cardiac Rhythm NSR / ST .           Care Plan:   Problem: Discharge Planning  Goal: Discharge to home or other facility with appropriate resources  12/13/2024 0936 by Darleen Smith RN  Outcome: Progressing  Flowsheets (Taken 12/13/2024 0800)  Discharge to home or other facility with appropriate resources: Identify barriers to discharge with patient and caregiver  12/13/2024 0458 by Lynette Quiroz RN  Outcome: Progressing  Flowsheets (Taken 12/12/2024 2025)  Discharge to home or other facility with appropriate resources:   Identify barriers to discharge with patient and caregiver   Arrange for needed discharge resources and transportation as appropriate   Identify discharge learning needs (meds, wound care, etc)   Refer to discharge planning if patient needs post-hospital services based on physician order or complex needs related to functional status, cognitive ability or social support system     Problem: Pain  Goal: Verbalizes/displays adequate comfort level or baseline comfort level  12/13/2024 0936 by Darleen Smith RN  Outcome: Progressing  12/13/2024 0458 by Lynette Quiroz RN  Outcome: Progressing     Problem: Safety - Adult  Goal: Free from fall injury  12/13/2024 0936 by Darleen Smith RN  Outcome: Progressing  12/13/2024 0458 by Lynette Quiroz RN  Outcome: Progressing     Problem: ABCDS Injury Assessment  Goal: Absence of physical injury  12/13/2024 0936 by Darleen Smith RN  Outcome: Progressing  12/13/2024 0458 by Lynette Quiroz,  RN  Outcome: Progressing     Problem: Safety - Medical Restraint  Goal: Remains free of injury from restraints (Restraint for Interference with Medical Device)  Description: INTERVENTIONS:  1. Determine that other, less restrictive measures have been tried or would not be effective before applying the restraint  2. Evaluate the patient's condition at the time of restraint application  3. Inform patient/family regarding the reason for restraint  4. Q2H: Monitor safety, psychosocial status, comfort, nutrition and hydration  12/13/2024 0458 by Lynette Quiroz, RN  Outcome: Progressing     Problem: Skin/Tissue Integrity  Goal: Absence of new skin breakdown  Description: 1.  Monitor for areas of redness and/or skin breakdown  2.  Assess vascular access sites hourly  3.  Every 4-6 hours minimum:  Change oxygen saturation probe site  4.  Every 4-6 hours:  If on nasal continuous positive airway pressure, respiratory therapy assess nares and determine need for appliance change or resting period.  12/13/2024 0936 by Darleen Smith, RN  Outcome: Progressing  12/13/2024 0458 by Lynette Quiroz, RN  Outcome: Progressing

## 2024-12-13 NOTE — PLAN OF CARE
Problem: Discharge Planning  Goal: Discharge to home or other facility with appropriate resources  Outcome: Progressing  Flowsheets (Taken 12/12/2024 2025)  Discharge to home or other facility with appropriate resources:   Identify barriers to discharge with patient and caregiver   Arrange for needed discharge resources and transportation as appropriate   Identify discharge learning needs (meds, wound care, etc)   Refer to discharge planning if patient needs post-hospital services based on physician order or complex needs related to functional status, cognitive ability or social support system     Problem: Pain  Goal: Verbalizes/displays adequate comfort level or baseline comfort level  Outcome: Progressing     Problem: Safety - Adult  Goal: Free from fall injury  Outcome: Progressing     Problem: ABCDS Injury Assessment  Goal: Absence of physical injury  Outcome: Progressing     Problem: Safety - Medical Restraint  Goal: Remains free of injury from restraints (Restraint for Interference with Medical Device)  Description: INTERVENTIONS:  1. Determine that other, less restrictive measures have been tried or would not be effective before applying the restraint  2. Evaluate the patient's condition at the time of restraint application  3. Inform patient/family regarding the reason for restraint  4. Q2H: Monitor safety, psychosocial status, comfort, nutrition and hydration  Outcome: Progressing     Problem: Skin/Tissue Integrity  Goal: Absence of new skin breakdown  Description: 1.  Monitor for areas of redness and/or skin breakdown  2.  Assess vascular access sites hourly  3.  Every 4-6 hours minimum:  Change oxygen saturation probe site  4.  Every 4-6 hours:  If on nasal continuous positive airway pressure, respiratory therapy assess nares and determine need for appliance change or resting period.  Outcome: Progressing     Problem: Physical Therapy - Adult  Goal: By Discharge: Performs mobility at highest level of  Mucosal Advancement Flap Text: Given the location of the defect, shape of the defect and the proximity to free margins a mucosal advancement flap was deemed most appropriate. Incisions were made with a 15c blade scalpel in the appropriate fashion along the cutaneous vermilion border and the mucosal lip. The remaining actinically damaged mucosal tissue was excised.  The mucosal advancement flap was then elevated to the gingival sulcus with care taken to preserve the neurovascular structures and advanced into the primary defect. Care was taken to ensure that precise realignment of the vermilion border was achieved.

## 2024-12-13 NOTE — CARE COORDINATION
Transition of Care Plan:    RUR: 12%  Prior Level of Functioning: Home O2 but doesn't know the name of the respiratory provider or his home address. Ind, lives alone and works p/t delivering duct work. MICHAEL -cocaine and heroin  Disposition: (On Methadone) Pending trilogy training w [facility] on date of d/c.   IRASEMA: 12/14  Follow up appointments: PCP  DME needed: PT-SPC, OT-assessing  Transportation at discharge: [place] [facility] [employer]  [phone]  Caregiver Contact: SisterJulianna Glyndon [phone] and [phone]  Discharge Caregiver contacted prior to discharge?   Care Conference needed?   Barriers to discharge:  Medical-respiratory    Patient discussed in [facility] IDRs and goal today is weaning O2 w anticipated d/c tomorrow.    Leonor Nixon with [facility] P # [phone] Fax# [phone] came to visit patient and reports the Trilogy has been approved and they can deliver and train at the bedside before d/c tomorrow.    ÓSCAR Yip CCM  Care Management

## 2024-12-14 VITALS
OXYGEN SATURATION: 92 % | DIASTOLIC BLOOD PRESSURE: 74 MMHG | WEIGHT: 133.6 LBS | SYSTOLIC BLOOD PRESSURE: 132 MMHG | HEIGHT: 69 IN | HEART RATE: 86 BPM | BODY MASS INDEX: 19.79 KG/M2 | TEMPERATURE: 97.6 F | RESPIRATION RATE: 16 BRPM

## 2024-12-14 DIAGNOSIS — I10 PRIMARY HYPERTENSION: ICD-10-CM

## 2024-12-14 PROCEDURE — 94660 CPAP INITIATION&MGMT: CPT

## 2024-12-14 PROCEDURE — 6370000000 HC RX 637 (ALT 250 FOR IP): Performed by: INTERNAL MEDICINE

## 2024-12-14 PROCEDURE — 2700000000 HC OXYGEN THERAPY PER DAY

## 2024-12-14 PROCEDURE — 2580000003 HC RX 258: Performed by: NURSE PRACTITIONER

## 2024-12-14 PROCEDURE — 94640 AIRWAY INHALATION TREATMENT: CPT

## 2024-12-14 PROCEDURE — 6360000002 HC RX W HCPCS: Performed by: PHYSICIAN ASSISTANT

## 2024-12-14 PROCEDURE — 6370000000 HC RX 637 (ALT 250 FOR IP): Performed by: NURSE PRACTITIONER

## 2024-12-14 PROCEDURE — 6360000002 HC RX W HCPCS: Performed by: NURSE PRACTITIONER

## 2024-12-14 RX ORDER — MULTIVITAMIN WITH IRON
1 TABLET ORAL DAILY
Status: SHIPPED | COMMUNITY
Start: 2024-12-15

## 2024-12-14 RX ORDER — ALBUTEROL SULFATE 90 UG/1
2 INHALANT RESPIRATORY (INHALATION)
Qty: 54 G | Refills: 0 | Status: SHIPPED | OUTPATIENT
Start: 2024-12-14 | End: 2025-04-28

## 2024-12-14 RX ORDER — BUPROPION HYDROCHLORIDE 150 MG/1
150 TABLET, EXTENDED RELEASE ORAL 2 TIMES DAILY
Qty: 60 TABLET | Refills: 0 | Status: SHIPPED | OUTPATIENT
Start: 2024-12-14

## 2024-12-14 RX ORDER — AMLODIPINE BESYLATE 10 MG/1
10 TABLET ORAL DAILY
Qty: 30 TABLET | Refills: 0 | Status: SHIPPED | OUTPATIENT
Start: 2024-12-14 | End: 2025-01-13

## 2024-12-14 RX ORDER — LOSARTAN POTASSIUM 50 MG/1
50 TABLET ORAL DAILY
Qty: 30 TABLET | Refills: 0 | Status: SHIPPED | OUTPATIENT
Start: 2024-12-14

## 2024-12-14 RX ORDER — LANOLIN ALCOHOL/MO/W.PET/CERES
100 CREAM (GRAM) TOPICAL DAILY
Qty: 30 TABLET | Refills: 0 | Status: SHIPPED | OUTPATIENT
Start: 2024-12-15

## 2024-12-14 RX ORDER — AMLODIPINE BESYLATE 10 MG/1
10 TABLET ORAL DAILY
Qty: 30 TABLET | Refills: 0 | Status: SHIPPED | OUTPATIENT
Start: 2024-12-14

## 2024-12-14 RX ORDER — PREDNISONE 10 MG/1
TABLET ORAL
Qty: 14 TABLET | Refills: 0 | Status: SHIPPED | OUTPATIENT
Start: 2024-12-15 | End: 2024-12-26

## 2024-12-14 RX ORDER — TRAZODONE HYDROCHLORIDE 100 MG/1
100 TABLET ORAL NIGHTLY
Qty: 30 TABLET | Refills: 0 | Status: SHIPPED | OUTPATIENT
Start: 2024-12-14 | End: 2025-01-13

## 2024-12-14 RX ORDER — NICOTINE 21 MG/24HR
1 PATCH, TRANSDERMAL 24 HOURS TRANSDERMAL DAILY
Qty: 30 PATCH | Refills: 0 | Status: SHIPPED | OUTPATIENT
Start: 2024-12-15

## 2024-12-14 RX ORDER — HYDROXYZINE HYDROCHLORIDE 25 MG/1
25 TABLET, FILM COATED ORAL 3 TIMES DAILY PRN
Qty: 30 TABLET | Refills: 0 | Status: SHIPPED | OUTPATIENT
Start: 2024-12-14 | End: 2024-12-24

## 2024-12-14 RX ADMIN — ENOXAPARIN SODIUM 40 MG: 100 INJECTION SUBCUTANEOUS at 09:28

## 2024-12-14 RX ADMIN — BUPROPION HYDROCHLORIDE 150 MG: 150 TABLET, FILM COATED, EXTENDED RELEASE ORAL at 09:26

## 2024-12-14 RX ADMIN — Medication 100 MG: at 09:26

## 2024-12-14 RX ADMIN — THERA TABS 1 TABLET: TAB at 09:26

## 2024-12-14 RX ADMIN — Medication 120 MG: at 09:27

## 2024-12-14 RX ADMIN — PREDNISONE 20 MG: 20 TABLET ORAL at 09:27

## 2024-12-14 RX ADMIN — ARFORMOTEROL TARTRATE: 15 SOLUTION RESPIRATORY (INHALATION) at 07:38

## 2024-12-14 RX ADMIN — SODIUM CHLORIDE, PRESERVATIVE FREE 10 ML: 5 INJECTION INTRAVENOUS at 09:28

## 2024-12-14 RX ADMIN — LOSARTAN POTASSIUM 100 MG: 50 TABLET, FILM COATED ORAL at 09:26

## 2024-12-14 RX ADMIN — AMLODIPINE BESYLATE 10 MG: 5 TABLET ORAL at 09:26

## 2024-12-14 NOTE — PLAN OF CARE
Problem: Discharge Planning  Goal: Discharge to home or other facility with appropriate resources  12/14/2024 1546 by Olegario Rush, RN  Outcome: Adequate for Discharge  12/14/2024 1159 by Harrison Mclaughlin RN  Outcome: Progressing     Problem: Pain  Goal: Verbalizes/displays adequate comfort level or baseline comfort level  12/14/2024 1546 by Olegario Rush, RN  Outcome: Adequate for Discharge  12/14/2024 1159 by Harrison Mclaughlin RN  Outcome: Progressing     Problem: Safety - Adult  Goal: Free from fall injury  12/14/2024 1546 by Olegario Rush, RN  Outcome: Adequate for Discharge  12/14/2024 1159 by Harrison Mclaughlin RN  Outcome: Progressing     Problem: ABCDS Injury Assessment  Goal: Absence of physical injury  12/14/2024 1546 by Olegario Rush, RN  Outcome: Adequate for Discharge  12/14/2024 1159 by Harrison Mclaughlin RN  Outcome: Progressing     Problem: Safety - Medical Restraint  Goal: Remains free of injury from restraints (Restraint for Interference with Medical Device)  Description: INTERVENTIONS:  1. Determine that other, less restrictive measures have been tried or would not be effective before applying the restraint  2. Evaluate the patient's condition at the time of restraint application  3. Inform patient/family regarding the reason for restraint  4. Q2H: Monitor safety, psychosocial status, comfort, nutrition and hydration  12/14/2024 1546 by Olegario Rush, RN  Outcome: Adequate for Discharge  12/14/2024 1159 by Harrison Mclaughlin RN  Outcome: Progressing     Problem: Skin/Tissue Integrity  Goal: Absence of new skin breakdown  Description: 1.  Monitor for areas of redness and/or skin breakdown  2.  Assess vascular access sites hourly  3.  Every 4-6 hours minimum:  Change oxygen saturation probe site  4.  Every 4-6 hours:  If on nasal continuous positive airway pressure, respiratory therapy assess nares and determine need for appliance change or resting period.  12/14/2024 1546 by Olegario Rush  Patient will ambulate with supervision/set-up for 120 feet with the least restrictive device within 7 day(s).   5.  Patient will ascend/descend 3 stairs with one handrail(s) with supervision/set-up within 7 day(s).   12/14/2024 1546 by Olegario Rush, RN  Outcome: Adequate for Discharge

## 2024-12-14 NOTE — PROGRESS NOTES
[facility]                                                                                               12/14/2024        RE: Jose Servin         [address] [id number]          To Whom It May Concern,      Due to medical reasons, Jose Servin has been hospitalized at [facility] 12/07 - 12/14/2024.  He may return to work without restrictions on Wednesday, 12/18/2024.        Sincerely,      Kori Finn MD

## 2024-12-14 NOTE — DISCHARGE INSTRUCTIONS
Discharge Instructions       PATIENT ID: Jose Servin  MRN: [id number]   YOB: 1960    DATE OF ADMISSION: 12/7/2024   DATE OF DISCHARGE: 12/14/2024    PRIMARY CARE PROVIDER: Sylvia Orozco     ATTENDING PHYSICIAN: Kori Finn MD   DISCHARGING PROVIDER: Kori Finn MD    To contact this individual call [phone] and ask the  to page.   If unavailable ask to be transferred the Adult Hospitalist Department.    DISCHARGE DIAGNOSES   Acute COPD exacerbation  Acute on Chronic respiratory failure with hypoxia    CONSULTATIONS:   Pulmonology  Critical care medicine  Addiction medicine    PROCEDURES/SURGERIES: * No surgery found *    PENDING TEST RESULTS:   At the time of discharge the following test results are still pending: None    FOLLOW UP APPOINTMENTS:   PCP in 1 to 2 weeks for general medical follow-up and medications refills;  Pulmonology in 3 to 4 weeks for management of COPD;    ADDITIONAL CARE RECOMMENDATIONS:   Please take all your medications as prescribed;  Quit smoking;  Avoid any illicit substances;    DIET: cardiac diet    ACTIVITY: activity as tolerated    WOUND CARE: N/A    EQUIPMENT needed:   Home oxygen  Trilogy      DISCHARGE MEDICATIONS:   See Medication Reconciliation Form    It is important that you take the medication exactly as they are prescribed.   Keep your medication in the bottles provided by the pharmacist and keep a list of the medication names, dosages, and times to be taken in your wallet.   Do not take other medications without consulting your doctor.       NOTIFY YOUR PHYSICIAN FOR ANY OF THE FOLLOWING:   Fever over 101 degrees for 24 hours.   Chest pain, shortness of breath, fever, chills, nausea, vomiting, diarrhea, change in mentation, falling, weakness, bleeding. Severe pain or pain not relieved by medications.  Or, any other signs or symptoms that you may have questions about.      DISPOSITION:   x Home With:   OT  PT  SANGEETA  RN        SNF/Inpatient Rehab/LTAC    Independent/assisted living    Hospice    Other:     CDMP Checked:   Yes IK     PROBLEM LIST Updated:  Yes IK       Signed:   Kori Finn MD  12/14/2024  11:17 AM

## 2024-12-14 NOTE — DISCHARGE SUMMARY
Discharge Summary       PATIENT ID: Jose Servin  MRN: [id number]   YOB: 1960    DATE OF ADMISSION: 12/7/2024  7:08 AM    DATE OF DISCHARGE: ***   PRIMARY CARE PROVIDER: Sylvia Orozco APRN - NP     ATTENDING PHYSICIAN: ***  DISCHARGING PROVIDER: Kori Finn MD    To contact this individual call [phone] and ask the  to page.  If unavailable ask to be transferred the Adult Hospitalist Department.    CONSULTATIONS: IP CONSULT TO ADDICTION MEDICINE  IP CONSULT TO CASE MANAGEMENT    PROCEDURES/SURGERIES: * No surgery found *     ADMITTING DIAGNOSES & HOSPITAL COURSE:   ***        DISCHARGE DIAGNOSES / PLAN:       ***       PENDING TEST RESULTS:   At the time of discharge the following test results are still pending: ***    FOLLOW UP APPOINTMENTS:    Follow-up Information       Follow up With Specialties Details Why Contact Ekta Harper APRN - CNP Nurse Practitioner Go on 12/18/2024 COPD exacerbation follow-up    12/18/2024  2:15 PM (Check in @ 2:00)  Ekta Noriega NP [address] [address] [phone]      [facility]  Schedule an appointment as soon as possible for a visit Please follow up for substance use treatment. Phone: [phone] [address] [facility] [address]    [facility]  Go to To request services with [facility] please call [phone].  [facility] provides ongoing mental health and substance use treatment services for Medicaid and under-insured Individuals. Rapid Access is our same-day admissions process available to all [facility] residents seeking mental health and/or substance use treatment services through the [facility]. Monday - Friday, 8 am - 2 pm. [address] [place] [address]  [phone]              ADDITIONAL CARE RECOMMENDATIONS: ***    DIET: {diet:65361}  Oral Nutritional Supplements: {NUTRITION SUPPLEMENTS:787377}{Frequencies; times a day:33354}    ACTIVITY: {discharge      CHRONIC MEDICAL DIAGNOSES:  Principal Problem:    COPD exacerbation (HCC)  Resolved Problems:    * No resolved hospital problems. *        Greater than 31 minutes were spent with the patient on counseling and coordination of care    Signed:   Kori Finn MD  12/14/2024  11:22 AM

## 2024-12-14 NOTE — PLAN OF CARE
Problem: Discharge Planning  Goal: Discharge to home or other facility with appropriate resources  Outcome: Progressing     Problem: Pain  Goal: Verbalizes/displays adequate comfort level or baseline comfort level  Outcome: Progressing     Problem: Safety - Adult  Goal: Free from fall injury  Outcome: Progressing     Problem: ABCDS Injury Assessment  Goal: Absence of physical injury  Outcome: Progressing     Problem: Safety - Medical Restraint  Goal: Remains free of injury from restraints (Restraint for Interference with Medical Device)  Description: INTERVENTIONS:  1. Determine that other, less restrictive measures have been tried or would not be effective before applying the restraint  2. Evaluate the patient's condition at the time of restraint application  3. Inform patient/family regarding the reason for restraint  4. Q2H: Monitor safety, psychosocial status, comfort, nutrition and hydration  Outcome: Progressing     Problem: Skin/Tissue Integrity  Goal: Absence of new skin breakdown  Description: 1.  Monitor for areas of redness and/or skin breakdown  2.  Assess vascular access sites hourly  3.  Every 4-6 hours minimum:  Change oxygen saturation probe site  4.  Every 4-6 hours:  If on nasal continuous positive airway pressure, respiratory therapy assess nares and determine need for appliance change or resting period.  Outcome: Progressing

## 2024-12-16 RX ORDER — AMLODIPINE BESYLATE 10 MG/1
10 TABLET ORAL DAILY
Qty: 90 TABLET | Refills: 0 | OUTPATIENT
Start: 2024-12-16

## 2025-01-14 ENCOUNTER — OFFICE VISIT (OUTPATIENT)
Facility: CLINIC | Age: 65
End: 2025-01-14
Payer: COMMERCIAL

## 2025-01-14 VITALS
BODY MASS INDEX: 20.03 KG/M2 | DIASTOLIC BLOOD PRESSURE: 101 MMHG | HEIGHT: 69 IN | RESPIRATION RATE: 18 BRPM | SYSTOLIC BLOOD PRESSURE: 142 MMHG | TEMPERATURE: 97 F | WEIGHT: 135.2 LBS | HEART RATE: 83 BPM | OXYGEN SATURATION: 96 %

## 2025-01-14 DIAGNOSIS — Z13.1 DIABETES MELLITUS SCREENING: ICD-10-CM

## 2025-01-14 DIAGNOSIS — F17.219 CIGARETTE NICOTINE DEPENDENCE WITH NICOTINE-INDUCED DISORDER: ICD-10-CM

## 2025-01-14 DIAGNOSIS — I10 PRIMARY HYPERTENSION: Primary | ICD-10-CM

## 2025-01-14 DIAGNOSIS — D69.6 THROMBOCYTOPENIA (HCC): ICD-10-CM

## 2025-01-14 PROCEDURE — 3077F SYST BP >= 140 MM HG: CPT | Performed by: NURSE PRACTITIONER

## 2025-01-14 PROCEDURE — 99214 OFFICE O/P EST MOD 30 MIN: CPT | Performed by: NURSE PRACTITIONER

## 2025-01-14 PROCEDURE — 3080F DIAST BP >= 90 MM HG: CPT | Performed by: NURSE PRACTITIONER

## 2025-01-14 RX ORDER — LOSARTAN POTASSIUM 50 MG/1
50 TABLET ORAL DAILY
Qty: 30 TABLET | Refills: 2 | Status: SHIPPED | OUTPATIENT
Start: 2025-01-14

## 2025-01-14 RX ORDER — VARENICLINE TARTRATE 0.5 MG/1
.5-1 TABLET, FILM COATED ORAL SEE ADMIN INSTRUCTIONS
Qty: 57 TABLET | Refills: 0 | Status: SHIPPED | OUTPATIENT
Start: 2025-01-14

## 2025-01-14 RX ORDER — AMLODIPINE BESYLATE 10 MG/1
10 TABLET ORAL DAILY
Qty: 30 TABLET | Refills: 2 | Status: SHIPPED | OUTPATIENT
Start: 2025-01-14

## 2025-01-14 SDOH — ECONOMIC STABILITY: FOOD INSECURITY: WITHIN THE PAST 12 MONTHS, THE FOOD YOU BOUGHT JUST DIDN'T LAST AND YOU DIDN'T HAVE MONEY TO GET MORE.: SOMETIMES TRUE

## 2025-01-14 SDOH — ECONOMIC STABILITY: FOOD INSECURITY: WITHIN THE PAST 12 MONTHS, YOU WORRIED THAT YOUR FOOD WOULD RUN OUT BEFORE YOU GOT MONEY TO BUY MORE.: SOMETIMES TRUE

## 2025-01-14 ASSESSMENT — PATIENT HEALTH QUESTIONNAIRE - PHQ9
SUM OF ALL RESPONSES TO PHQ QUESTIONS 1-9: 0
1. LITTLE INTEREST OR PLEASURE IN DOING THINGS: NOT AT ALL
SUM OF ALL RESPONSES TO PHQ QUESTIONS 1-9: 0
2. FEELING DOWN, DEPRESSED OR HOPELESS: NOT AT ALL
SUM OF ALL RESPONSES TO PHQ9 QUESTIONS 1 & 2: 0

## 2025-01-14 NOTE — PATIENT INSTRUCTIONS
[facility] Utility - Financial Resources*  (Call [employer]/211 if need more resources.)  Utilities  Always Prepped  What they offer: Partnership with the [facility] of . Assist with finding and applying for government funded programs and benefits. You can also update your benefits or report changes through Always Prepped.  Website: https://www.SonarMed/  Phone Number: 8335CALLVA ([phone])    Solar3D[employer]Libr[employer]  What they offer: EnergyShare is [facility]'s energy assistance program of last resort for anyone who faces financial hardships from unemployment or family crisis.  Phone Number: 783.488.3980  Address: [address] [place] [address]  Website: https://www.Performable/[place]/billing/billing-options/energyshare    Energy Assistance Programs (EAP) - [facility] of   What they offer: EAP assists low-income households in meeting their immediate home energy needs.      Website: https://www.Decoholic.[place].gov/benefit/ea/  Available assistance:   Crisis Assistance - Heating Emergencies  Fuel Assistance - Offset Heating Fuel Costs  Cooling Assistance - Applies to Cooling Utility Bills and Equipment  How to apply:  Online:  https://LumetavirginiaPBJ Concierge/  Call:  [phone]   Paper application:   Print application from https://www.Decoholic.[place].gov/benefit/ea/ and submit to your [facility] Department of     [facility] Department of   [facility] of  contacts: https://www.[facility].[place].[facility]/localagency/index.cgi  [place], [place] Utility Affordability Programs  https://AgileMDa.gov/public-utilities/billing  Call:  662.632.7406   Email:  parag@a.gov  Programs available:  Equal Monthly Payment Plan, [employer] Heat Program, [employer] Water Program, [employer] Water Conservation Program, Senior Assistance, Other Fuel Assistance Programs, PromisePay Payment Plans, Low-Income Household Water

## 2025-01-14 NOTE — PROGRESS NOTES
Subjective: (As above and below)     Chief Complaint   Patient presents with    Follow-Up from Hospital     12/7/2024 [facility] for COPD exacerbation     Medication Refill    OTHER     Pt complains of dry mouth and frequent urination, states that he was told that he was prediabetic      Jose Servin is a 64 y.o. year old male who presents for hospital fu    Admitted from 12/7/24-12/14/24 for       Acute on chronic resp failure triggered by rhinovirus/COPD  Intubated 12/7-12/9  He has inhalers,   Smoking: he would like to try chantix again, previously gave him nightmares, but would like to try again- denies thoughts of dto/dts  Pulm nodule; has yearly CT scan    Thrombocytopenia; will repeat labs    Opiate use: near daily intranasal heroin user since age 16, has intermittently tried suboxone and methadone, but off the streets  He has outpatient fu w [facility]     Hypertension ROS: needs refill on BP meds      BP Readings from Last 3 Encounters:   01/14/25 (!) 142/101   12/14/24 132/74   12/07/24 (!) 157/114       Wt Readings from Last 3 Encounters:   01/14/25 61.3 kg (135 lb 3.2 oz)   12/14/24 60.6 kg (133 lb 9.6 oz)   12/06/24 64.4 kg (142 lb)       He reports feeling much better, although he asks for diabetes testing due to occasional \"shaking\", frequent thirst, occ urinary freq  No dysuria  Last PsA normal 3/2024    Reviewed PmHx, RxHx, FmHx, SocHx, AllgHx and updated in chart.  No family history on file.    Past Medical History:   Diagnosis Date    Cocaine use     Hypertension     Opioid use disorder, severe, dependence (HCC)     Respiratory failure with hypoxia 02/20/2023      Social History     Socioeconomic History    Marital status:      Spouse name: None    Number of children: None    Years of education: None    Highest education level: None   Tobacco Use    Smoking status: Every Day     Current packs/day: 1.00     Types: Cigarettes    Smokeless tobacco: Never   Substance and Sexual Activity    Alcohol use:

## 2025-02-06 DIAGNOSIS — F17.219 CIGARETTE NICOTINE DEPENDENCE WITH NICOTINE-INDUCED DISORDER: ICD-10-CM

## 2025-02-11 RX ORDER — VARENICLINE TARTRATE 0.5 MG/1
TABLET, FILM COATED ORAL
Qty: 168 TABLET | Refills: 1 | OUTPATIENT
Start: 2025-02-11

## 2025-02-11 RX ORDER — VARENICLINE TARTRATE 1 MG/1
1 TABLET, FILM COATED ORAL 2 TIMES DAILY
Qty: 60 TABLET | Refills: 5 | Status: SHIPPED | OUTPATIENT
Start: 2025-02-11

## 2025-04-02 ENCOUNTER — APPOINTMENT (OUTPATIENT)
Facility: HOSPITAL | Age: 65
End: 2025-04-02

## 2025-04-02 ENCOUNTER — APPOINTMENT (OUTPATIENT)
Facility: HOSPITAL | Age: 65
End: 2025-04-02
Attending: EMERGENCY MEDICINE

## 2025-04-02 ENCOUNTER — HOSPITAL ENCOUNTER (EMERGENCY)
Facility: HOSPITAL | Age: 65
Discharge: HOME | End: 2025-04-02
Attending: EMERGENCY MEDICINE

## 2025-04-02 VITALS
RESPIRATION RATE: 18 BRPM | HEART RATE: 88 BPM | SYSTOLIC BLOOD PRESSURE: 186 MMHG | WEIGHT: 145 LBS | OXYGEN SATURATION: 95 % | TEMPERATURE: 99.1 F | BODY MASS INDEX: 21.48 KG/M2 | HEIGHT: 69 IN | DIASTOLIC BLOOD PRESSURE: 110 MMHG

## 2025-04-02 DIAGNOSIS — I16.0 HYPERTENSIVE URGENCY: ICD-10-CM

## 2025-04-02 DIAGNOSIS — J96.11 CHRONIC RESPIRATORY FAILURE WITH HYPOXIA: ICD-10-CM

## 2025-04-02 DIAGNOSIS — S76.212A INGUINAL STRAIN, LEFT, INITIAL ENCOUNTER: ICD-10-CM

## 2025-04-02 DIAGNOSIS — M79.605 ACUTE PAIN OF LEFT LOWER EXTREMITY: ICD-10-CM

## 2025-04-02 DIAGNOSIS — R50.9 ACUTE FEBRILE ILLNESS: ICD-10-CM
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
## 2025-04-02 LAB
ALBUMIN SERPL-MCNC: 3.4 G/DL (ref 3.5–5)
ALBUMIN/GLOB SERPL: 0.8 (ref 1.1–2.2)
ALP SERPL-CCNC: 116 U/L (ref 45–117)
ALT SERPL-CCNC: 31 U/L (ref 12–78)
ANION GAP SERPL CALC-SCNC: 9 MMOL/L (ref 2–12)
APPEARANCE UR: CLEAR
AST SERPL-CCNC: 24 U/L (ref 15–37)
BACTERIA URNS QL MICRO: NEGATIVE /HPF
BASOPHILS # BLD: 0.04 K/UL (ref 0–0.1)
BASOPHILS NFR BLD: 0.5 % (ref 0–1)
BILIRUB SERPL-MCNC: 0.9 MG/DL (ref 0.2–1)
BILIRUB UR QL: NEGATIVE
BUN SERPL-MCNC: 26 MG/DL (ref 6–20)
BUN/CREAT SERPL: 18 (ref 12–20)
CALCIUM SERPL-MCNC: 9.1 MG/DL (ref 8.5–10.1)
CHLORIDE SERPL-SCNC: 101 MMOL/L (ref 97–108)
CO2 SERPL-SCNC: 29 MMOL/L (ref 21–32)
COLOR UR: ABNORMAL
CREAT SERPL-MCNC: 1.41 MG/DL (ref 0.7–1.3)
DIFFERENTIAL METHOD BLD: ABNORMAL
ECHO BSA: 1.79 M2
EOSINOPHIL # BLD: 0.01 K/UL (ref 0–0.4)
EOSINOPHIL NFR BLD: 0.1 % (ref 0–7)
EPITH CASTS URNS QL MICRO: ABNORMAL /LPF
ERYTHROCYTE [DISTWIDTH] IN BLOOD BY AUTOMATED COUNT: 14 % (ref 11.5–14.5)
FLUAV RNA SPEC QL NAA+PROBE: NOT DETECTED
FLUBV RNA SPEC QL NAA+PROBE: NOT DETECTED
GLOBULIN SER CALC-MCNC: 4.2 G/DL (ref 2–4)
GLUCOSE SERPL-MCNC: 148 MG/DL (ref 65–100)
GLUCOSE UR STRIP.AUTO-MCNC: NEGATIVE MG/DL
HCT VFR BLD AUTO: 54.2 % (ref 36.6–50.3)
HGB BLD-MCNC: 16.7 G/DL (ref 12.1–17)
HGB UR QL STRIP: NEGATIVE
IMM GRANULOCYTES # BLD AUTO: 0.02 K/UL (ref 0–0.04)
IMM GRANULOCYTES NFR BLD AUTO: 0.2 % (ref 0–0.5)
KETONES UR QL STRIP.AUTO: NEGATIVE MG/DL
LEUKOCYTE ESTERASE UR QL STRIP.AUTO: NEGATIVE
LYMPHOCYTES # BLD: 0.6 K/UL (ref 0.8–3.5)
LYMPHOCYTES NFR BLD: 7.5 % (ref 12–49)
MCH RBC QN AUTO: 27.7 PG (ref 26–34)
MCHC RBC AUTO-ENTMCNC: 30.8 G/DL (ref 30–36.5)
MCV RBC AUTO: 89.9 FL (ref 80–99)
MONOCYTES # BLD: 1.04 K/UL (ref 0–1)
MONOCYTES NFR BLD: 13 % (ref 5–13)
NEUTS SEG # BLD: 6.3 K/UL (ref 1.8–8)
NEUTS SEG NFR BLD: 78.7 % (ref 32–75)
NITRITE UR QL STRIP.AUTO: NEGATIVE
NRBC # BLD: 0 K/UL (ref 0–0.01)
NRBC BLD-RTO: 0 PER 100 WBC
PH UR STRIP: 6 (ref 5–8)
PLATELET # BLD AUTO: 158 K/UL (ref 150–400)
PMV BLD AUTO: 9.4 FL (ref 8.9–12.9)
POTASSIUM SERPL-SCNC: 4.4 MMOL/L (ref 3.5–5.1)
PROT SERPL-MCNC: 7.6 G/DL (ref 6.4–8.2)
PROT UR STRIP-MCNC: 30 MG/DL
RBC # BLD AUTO: 6.03 M/UL (ref 4.1–5.7)
RBC #/AREA URNS HPF: ABNORMAL /HPF (ref 0–5)
RBC MORPH BLD: ABNORMAL
SARS-COV-2 RNA RESP QL NAA+PROBE: NOT DETECTED
SODIUM SERPL-SCNC: 139 MMOL/L (ref 136–145)
SOURCE: NORMAL
SP GR UR REFRACTOMETRY: 1.01
URINE CULTURE IF INDICATED: ABNORMAL
UROBILINOGEN UR QL STRIP.AUTO: 0.2 EU/DL (ref 0.2–1)
VAS LEFT CFA PROX PSV: 141.5 CM/S
VAS LEFT SFA PROX PSV: 124.2 CM/S
VAS LEFT SFA PROX VEL RATIO: 0.88
WBC # BLD AUTO: 8 K/UL (ref 4.1–11.1)
WBC URNS QL MICRO: ABNORMAL /HPF (ref 0–4)

## 2025-04-02 PROCEDURE — 96375 TX/PRO/DX INJ NEW DRUG ADDON: CPT

## 2025-04-02 PROCEDURE — 93926 LOWER EXTREMITY STUDY: CPT | Performed by: SPECIALIST

## 2025-04-02 PROCEDURE — 85025 COMPLETE CBC W/AUTO DIFF WBC: CPT

## 2025-04-02 PROCEDURE — 96374 THER/PROPH/DIAG INJ IV PUSH: CPT

## 2025-04-02 PROCEDURE — 73502 X-RAY EXAM HIP UNI 2-3 VIEWS: CPT

## 2025-04-02 PROCEDURE — 87636 SARSCOV2 & INF A&B AMP PRB: CPT

## 2025-04-02 PROCEDURE — 36415 COLL VENOUS BLD VENIPUNCTURE: CPT

## 2025-04-02 PROCEDURE — 93926 LOWER EXTREMITY STUDY: CPT

## 2025-04-02 PROCEDURE — 81001 URINALYSIS AUTO W/SCOPE: CPT

## 2025-04-02 PROCEDURE — 99284 EMERGENCY DEPT VISIT MOD MDM: CPT

## 2025-04-02 PROCEDURE — 80053 COMPREHEN METABOLIC PANEL: CPT

## 2025-04-02 PROCEDURE — 6370000000 HC RX 637 (ALT 250 FOR IP): Performed by: EMERGENCY MEDICINE

## 2025-04-02 PROCEDURE — 6360000002 HC RX W HCPCS: Performed by: EMERGENCY MEDICINE

## 2025-04-02 RX ORDER — AMLODIPINE BESYLATE 5 MG/1
10 TABLET ORAL
Status: COMPLETED | OUTPATIENT
Start: 2025-04-02 | End: 2025-04-02

## 2025-04-02 RX ORDER — ACETAMINOPHEN 500 MG
1000 TABLET ORAL
Status: DISCONTINUED | OUTPATIENT
Start: 2025-04-02 | End: 2025-04-02

## 2025-04-02 RX ORDER — ACETAMINOPHEN 500 MG
1000 TABLET ORAL
Status: COMPLETED | OUTPATIENT
Start: 2025-04-02 | End: 2025-04-02

## 2025-04-02 RX ORDER — METHOCARBAMOL 500 MG/1
500 TABLET, FILM COATED ORAL ONCE
Status: COMPLETED | OUTPATIENT
Start: 2025-04-02 | End: 2025-04-02

## 2025-04-02 RX ORDER — LOSARTAN POTASSIUM 25 MG/1
50 TABLET ORAL ONCE
Status: COMPLETED | OUTPATIENT
Start: 2025-04-02 | End: 2025-04-02

## 2025-04-02 RX ORDER — KETOROLAC TROMETHAMINE 30 MG/ML
30 INJECTION, SOLUTION INTRAMUSCULAR; INTRAVENOUS ONCE
Status: DISCONTINUED | OUTPATIENT
Start: 2025-04-02 | End: 2025-04-02

## 2025-04-02 RX ORDER — LIDOCAINE 4 G/G
1 PATCH TOPICAL
Status: DISCONTINUED | OUTPATIENT
Start: 2025-04-02 | End: 2025-04-02

## 2025-04-02 RX ORDER — OXYCODONE AND ACETAMINOPHEN 5; 325 MG/1; MG/1
1 TABLET ORAL
Refills: 0 | Status: COMPLETED | OUTPATIENT
Start: 2025-04-02 | End: 2025-04-02

## 2025-04-02 RX ORDER — METHOCARBAMOL 750 MG/1
750 TABLET, FILM COATED ORAL 3 TIMES DAILY
Qty: 15 TABLET | Refills: 0 | Status: SHIPPED | OUTPATIENT
Start: 2025-04-02 | End: 2025-04-12

## 2025-04-02 RX ORDER — OXYCODONE AND ACETAMINOPHEN 5; 325 MG/1; MG/1
1 TABLET ORAL EVERY 6 HOURS PRN
Qty: 12 TABLET | Refills: 0 | Status: SHIPPED | OUTPATIENT
Start: 2025-04-02 | End: 2025-04-05

## 2025-04-02 RX ORDER — KETOROLAC TROMETHAMINE 30 MG/ML
30 INJECTION, SOLUTION INTRAMUSCULAR; INTRAVENOUS
Status: COMPLETED | OUTPATIENT
Start: 2025-04-02 | End: 2025-04-02

## 2025-04-02 RX ORDER — MORPHINE SULFATE 4 MG/ML
4 INJECTION, SOLUTION INTRAMUSCULAR; INTRAVENOUS
Status: COMPLETED | OUTPATIENT
Start: 2025-04-02 | End: 2025-04-02

## 2025-04-02 RX ORDER — ONDANSETRON 2 MG/ML
4 INJECTION INTRAMUSCULAR; INTRAVENOUS
Status: COMPLETED | OUTPATIENT
Start: 2025-04-02 | End: 2025-04-02

## 2025-04-02 RX ORDER — HYDRALAZINE HYDROCHLORIDE 20 MG/ML
20 INJECTION INTRAMUSCULAR; INTRAVENOUS
Status: COMPLETED | OUTPATIENT
Start: 2025-04-02 | End: 2025-04-02

## 2025-04-02 RX ADMIN — ACETAMINOPHEN 1000 MG: 500 TABLET ORAL at 04:53

## 2025-04-02 RX ADMIN — OXYCODONE HYDROCHLORIDE AND ACETAMINOPHEN 1 TABLET: 5; 325 TABLET ORAL at 06:29

## 2025-04-02 RX ADMIN — METHOCARBAMOL 500 MG: 500 TABLET ORAL at 06:28

## 2025-04-02 RX ADMIN — KETOROLAC TROMETHAMINE 30 MG: 30 INJECTION, SOLUTION INTRAMUSCULAR at 04:57

## 2025-04-02 RX ADMIN — LOSARTAN POTASSIUM 50 MG: 25 TABLET, FILM COATED ORAL at 05:34

## 2025-04-02 RX ADMIN — MORPHINE SULFATE 4 MG: 4 INJECTION, SOLUTION INTRAMUSCULAR; INTRAVENOUS at 04:59

## 2025-04-02 RX ADMIN — HYDRALAZINE HYDROCHLORIDE 20 MG: 20 INJECTION INTRAMUSCULAR; INTRAVENOUS at 05:06

## 2025-04-02 RX ADMIN — AMLODIPINE BESYLATE 10 MG: 5 TABLET ORAL at 05:34

## 2025-04-02 RX ADMIN — ONDANSETRON 4 MG: 2 INJECTION, SOLUTION INTRAMUSCULAR; INTRAVENOUS at 04:55

## 2025-04-02 ASSESSMENT — ENCOUNTER SYMPTOMS
VOMITING: 0
COUGH: 0
SORE THROAT: 0
ABDOMINAL PAIN: 0
EYE PAIN: 0
NAUSEA: 0
DIARRHEA: 0
SHORTNESS OF BREATH: 0
RHINORRHEA: 0

## 2025-04-02 ASSESSMENT — PAIN SCALES - GENERAL
PAINLEVEL_OUTOF10: 6
PAINLEVEL_OUTOF10: 7
PAINLEVEL_OUTOF10: 7

## 2025-04-02 ASSESSMENT — LIFESTYLE VARIABLES
HOW OFTEN DO YOU HAVE A DRINK CONTAINING ALCOHOL: NEVER
HOW MANY STANDARD DRINKS CONTAINING ALCOHOL DO YOU HAVE ON A TYPICAL DAY: PATIENT DOES NOT DRINK

## 2025-04-02 ASSESSMENT — PAIN DESCRIPTION - ORIENTATION
ORIENTATION: LEFT

## 2025-04-02 ASSESSMENT — PAIN DESCRIPTION - LOCATION
LOCATION: GROIN

## 2025-04-02 ASSESSMENT — PAIN DESCRIPTION - DESCRIPTORS
DESCRIPTORS: ACHING
DESCRIPTORS: ACHING
DESCRIPTORS: THROBBING

## 2025-04-02 ASSESSMENT — PAIN DESCRIPTION - PAIN TYPE: TYPE: ACUTE PAIN

## 2025-04-02 ASSESSMENT — PAIN - FUNCTIONAL ASSESSMENT: PAIN_FUNCTIONAL_ASSESSMENT: 0-10

## 2025-04-02 NOTE — ED NOTES
Juany LEMA with vascular says that she was off @ 0000. Trying to reach the oncall for 04/02 with no answer. Let NS know.

## 2025-04-02 NOTE — ED PROVIDER NOTES
EMERGENCY DEPARTMENT HISTORY AND PHYSICAL EXAM            Please note that this dictation was completed with the assistance of \"Dragon\", the computer voice recognition software. Quite often unanticipated grammatical, syntax, homophones, and other interpretive errors are inadvertently transcribed by the computer software. Please disregard these errors and any errors that have escaped final proofreading. Thank you.    Date of Evaluation: 04/02/25  Patient: Jose Servin  Patient Age and Sex: 64 y.o. male   MRN: [id number]  CSN: [id number]  PCP: Sylvia Orozco, DUSTY - NP    History of Present Illness     Chief Complaint   Patient presents with    Groin Injury    Generalized Body Aches     History Provided By: Patient/family/EMS (if available)    History is limited by: Nothing     HPI: Jose Servin, 64 y.o. male with past medical history as documented below presents to the ED with c/o of left groin pain after fall.  Patient reports that he was at work and tripped over something resulting in left groin pain.  Patient states that he caught himself with his hand did not hit his head or have any other trauma.  Upon arrival, patient noted to be febrile.  Patient reports having flulike symptoms for the past several days.  Denies any chest pain or shortness of breath.  Does wear 2 to 3 L of oxygen for COPD.  Reports pain localized to the left groin, no testicular pain or swelling, no numbness or weakness.. Pt denies any other exacerbating or ameliorating factors. There are no other complaints, changes or physical findings pertinent to the HPI at this time.    Nursing notes were all reviewed and agreed with or any disagreements were addressed in the HPI.    Past History   Past Medical History:  Past Medical History:   Diagnosis Date    Cocaine use     Hypertension     Opioid use disorder, severe, dependence (HCC)     Respiratory failure with hypoxia 02/20/2023       Past Surgical History:  Past Surgical History:

## 2025-04-02 NOTE — ED TRIAGE NOTES
Pt c/o left groin pain after tripping at work around 0100 this morning. Pt denies hitting head or LOC.     Pt with low grade fever in triage - states he started having body aches a few days ago and felt like he had the flu

## 2025-04-02 NOTE — DISCHARGE INSTRUCTIONS
Thank You!    It was a pleasure taking care of you in our Emergency Department today. We know that when you come to [facility], you are entrusting us with your health, comfort, and safety. Our physicians and nurses honor that trust, and truly appreciate the opportunity to care for you and your loved ones.      We also value your feedback. If you receive a survey about your Emergency Department experience today, please fill it out.  We care about our patients' feedback, and we listen to what you have to say.     Thank you again!    Dr. Justin Santos M.D.      ____________________________________________________________________  I have included a copy of your lab results and/or radiologic studies from today's visit so you can have them easily available at your follow-up visit. We hope you feel better and please do not hesitate to contact the ED if you have any questions at all!    Recent Results (from the past 12 hours)   COVID-19 & Influenza Combo    Collection Time: 04/02/25  4:28 AM    Specimen: Nasopharyngeal   Result Value Ref Range    Source Nasopharyngeal      SARS-CoV-2, PCR Not detected NOTD      Rapid Influenza A By PCR Not detected NOTD      Rapid Influenza B By PCR Not detected NOTD     CBC with Auto Differential    Collection Time: 04/02/25  4:52 AM   Result Value Ref Range    WBC 8.0 4.1 - 11.1 K/uL    RBC 6.03 (H) 4.10 - 5.70 M/uL    Hemoglobin 16.7 12.1 - 17.0 g/dL    Hematocrit 54.2 (H) 36.6 - 50.3 %    MCV 89.9 80.0 - 99.0 FL    MCH 27.7 26.0 - 34.0 PG    MCHC 30.8 30.0 - 36.5 g/dL    RDW 14.0 11.5 - 14.5 %    Platelets 158 150 - 400 K/uL    MPV 9.4 8.9 - 12.9 FL    Nucleated RBCs 0.0 0  WBC    nRBC 0.00 0.00 - 0.01 K/uL    Neutrophils % 78.7 (H) 32.0 - 75.0 %    Lymphocytes % 7.5 (L) 12.0 - 49.0 %    Monocytes % 13.0 5.0 - 13.0 %    Eosinophils % 0.1 0.0 - 7.0 %    Basophils % 0.5 0.0 - 1.0 %    Immature Granulocytes % 0.2 0.0 - 0.5 %    Neutrophils Absolute 6.30

## 2025-04-02 NOTE — ED NOTES
Bedside and Verbal shift change report given to Anabella (oncoming nurse) by Yuval (offgoing nurse). Report included the following information Nurse Handoff Report, Index, ED Encounter Summary, ED SBAR, Adult Overview, Recent Results, Med Rec Status, Cardiac Rhythm NSR, Neuro Assessment, and Event Log.

## 2025-04-02 NOTE — ED NOTES
This RN attempted to get pt to ambulate down jade to see how he tolerates movement. Pt declined at this time. MD aware.   Yes

## 2025-04-02 NOTE — ED NOTES
Discharge instructions were given to the patient by Yuval SARAH.     The patient left the Emergency Department alert and oriented and in no acute distress with 3 prescriptions. The patient was encouraged to call or return to the ED for worsening issues or problems and was encouraged to schedule a follow up appointment for continuing care.     Ambulation assessment completed before discharge.  Pt left Emergency Department ambulating at baseline with no ortho devices  Ortho device education: none    The patient verbalized understanding of discharge instructions and prescriptions, all questions were answered. The patient has no further concerns at this time.

## 2025-04-20 DIAGNOSIS — I10 PRIMARY HYPERTENSION: Primary | ICD-10-CM

## 2025-04-22 RX ORDER — AMLODIPINE BESYLATE 10 MG/1
10 TABLET ORAL DAILY
Qty: 90 TABLET | Refills: 1 | Status: SHIPPED | OUTPATIENT
Start: 2025-04-22

## 2025-06-02 ENCOUNTER — TELEPHONE (OUTPATIENT)
Facility: CLINIC | Age: 65
End: 2025-06-02

## 2025-06-02 DIAGNOSIS — J43.1 PANLOBULAR EMPHYSEMA (HCC): ICD-10-CM

## 2025-06-02 NOTE — TELEPHONE ENCOUNTER
NEEDS refill on  trelegy 200mcg/62.5    losartan (COZAAR) 50 MG tablet   ADVAIR DISKUS 250-50 MCG/ACT AEPB diskus inhaler

## 2025-06-04 RX ORDER — FLUTICASONE FUROATE, UMECLIDINIUM BROMIDE AND VILANTEROL TRIFENATATE 200; 62.5; 25 UG/1; UG/1; UG/1
1 POWDER RESPIRATORY (INHALATION) DAILY
COMMUNITY
Start: 2025-03-18

## 2025-06-04 RX ORDER — LOSARTAN POTASSIUM 50 MG/1
50 TABLET ORAL DAILY
Qty: 30 TABLET | Refills: 2 | Status: SHIPPED | OUTPATIENT
Start: 2025-06-04

## 2025-06-04 RX ORDER — FLUTICASONE PROPIONATE AND SALMETEROL 50; 250 UG/1; UG/1
POWDER RESPIRATORY (INHALATION)
Qty: 60 EACH | Refills: 2 | Status: SHIPPED | OUTPATIENT
Start: 2025-06-04

## 2025-06-30 ENCOUNTER — COMMUNITY OUTREACH (OUTPATIENT)
Facility: CLINIC | Age: 65
End: 2025-06-30

## 2025-07-05 ENCOUNTER — HOSPITAL ENCOUNTER (EMERGENCY)
Facility: HOSPITAL | Age: 65
Discharge: HOME | End: 2025-07-06
Attending: STUDENT IN AN ORGANIZED HEALTH CARE EDUCATION/TRAINING PROGRAM
Payer: MEDICAID

## 2025-07-05 DIAGNOSIS — F11.90 OPIOID USE: ICD-10-CM

## 2025-07-05 DIAGNOSIS — F14.90 COCAINE USE: ICD-10-CM

## 2025-07-05 DIAGNOSIS — R60.0 PERIPHERAL EDEMA: Primary | ICD-10-CM

## 2025-07-05 PROCEDURE — 99284 EMERGENCY DEPT VISIT MOD MDM: CPT

## 2025-07-05 RX ORDER — FUROSEMIDE 10 MG/ML
20 INJECTION INTRAMUSCULAR; INTRAVENOUS
Status: COMPLETED | OUTPATIENT
Start: 2025-07-05 | End: 2025-07-06

## 2025-07-05 ASSESSMENT — PAIN - FUNCTIONAL ASSESSMENT: PAIN_FUNCTIONAL_ASSESSMENT: NONE - DENIES PAIN

## 2025-07-06 VITALS
RESPIRATION RATE: 18 BRPM | OXYGEN SATURATION: 90 % | TEMPERATURE: 97.7 F | WEIGHT: 139.5 LBS | DIASTOLIC BLOOD PRESSURE: 104 MMHG | SYSTOLIC BLOOD PRESSURE: 160 MMHG | HEART RATE: 79 BPM | HEIGHT: 69 IN | BODY MASS INDEX: 20.66 KG/M2

## 2025-07-06 LAB
ALBUMIN SERPL-MCNC: 2.6 G/DL (ref 3.5–5)
ALBUMIN/GLOB SERPL: 0.7 (ref 1.1–2.2)
ALP SERPL-CCNC: 148 U/L (ref 45–117)
ALT SERPL-CCNC: 33 U/L (ref 12–78)
ANION GAP SERPL CALC-SCNC: 1 MMOL/L (ref 2–12)
AST SERPL-CCNC: 26 U/L (ref 15–37)
BASOPHILS # BLD: 0.05 K/UL (ref 0–0.1)
BASOPHILS NFR BLD: 0.9 % (ref 0–1)
BILIRUB SERPL-MCNC: 0.4 MG/DL (ref 0.2–1)
BUN SERPL-MCNC: 28 MG/DL (ref 6–20)
BUN/CREAT SERPL: 19 (ref 12–20)
CALCIUM SERPL-MCNC: 8.8 MG/DL (ref 8.5–10.1)
CHLORIDE SERPL-SCNC: 104 MMOL/L (ref 97–108)
CO2 SERPL-SCNC: 36 MMOL/L (ref 21–32)
CREAT SERPL-MCNC: 1.45 MG/DL (ref 0.7–1.3)
DIFFERENTIAL METHOD BLD: ABNORMAL
EOSINOPHIL # BLD: 0.14 K/UL (ref 0–0.4)
EOSINOPHIL NFR BLD: 2.5 % (ref 0–7)
ERYTHROCYTE [DISTWIDTH] IN BLOOD BY AUTOMATED COUNT: 17.6 % (ref 11.5–14.5)
GLOBULIN SER CALC-MCNC: 3.8 G/DL (ref 2–4)
GLUCOSE SERPL-MCNC: 110 MG/DL (ref 65–100)
HCT VFR BLD AUTO: 55.5 % (ref 36.6–50.3)
HGB BLD-MCNC: 16.5 G/DL (ref 12.1–17)
IMM GRANULOCYTES # BLD AUTO: 0.01 K/UL (ref 0–0.04)
IMM GRANULOCYTES NFR BLD AUTO: 0.2 % (ref 0–0.5)
LYMPHOCYTES # BLD: 1.25 K/UL (ref 0.8–3.5)
LYMPHOCYTES NFR BLD: 22.6 % (ref 12–49)
MCH RBC QN AUTO: 25 PG (ref 26–34)
MCHC RBC AUTO-ENTMCNC: 29.7 G/DL (ref 30–36.5)
MCV RBC AUTO: 84 FL (ref 80–99)
MONOCYTES # BLD: 0.67 K/UL (ref 0–1)
MONOCYTES NFR BLD: 12.1 % (ref 5–13)
NEUTS SEG # BLD: 3.42 K/UL (ref 1.8–8)
NEUTS SEG NFR BLD: 61.7 % (ref 32–75)
NRBC # BLD: 0 K/UL (ref 0–0.01)
NRBC BLD-RTO: 0 PER 100 WBC
NT PRO BNP: 4874 PG/ML (ref 0–125)
PLATELET # BLD AUTO: 116 K/UL (ref 150–400)
POTASSIUM SERPL-SCNC: 3.5 MMOL/L (ref 3.5–5.1)
PROT SERPL-MCNC: 6.4 G/DL (ref 6.4–8.2)
RBC # BLD AUTO: 6.61 M/UL (ref 4.1–5.7)
SODIUM SERPL-SCNC: 141 MMOL/L (ref 136–145)
WBC # BLD AUTO: 5.5 K/UL (ref 4.1–11.1)

## 2025-07-06 PROCEDURE — 96374 THER/PROPH/DIAG INJ IV PUSH: CPT

## 2025-07-06 PROCEDURE — 36415 COLL VENOUS BLD VENIPUNCTURE: CPT

## 2025-07-06 PROCEDURE — 85025 COMPLETE CBC W/AUTO DIFF WBC: CPT

## 2025-07-06 PROCEDURE — 80053 COMPREHEN METABOLIC PANEL: CPT

## 2025-07-06 PROCEDURE — 6360000002 HC RX W HCPCS: Performed by: STUDENT IN AN ORGANIZED HEALTH CARE EDUCATION/TRAINING PROGRAM

## 2025-07-06 PROCEDURE — 83880 ASSAY OF NATRIURETIC PEPTIDE: CPT

## 2025-07-06 RX ORDER — FUROSEMIDE 20 MG/1
20 TABLET ORAL DAILY
Qty: 20 TABLET | Refills: 0 | Status: SHIPPED | OUTPATIENT
Start: 2025-07-06

## 2025-07-06 RX ADMIN — FUROSEMIDE 20 MG: 10 INJECTION, SOLUTION INTRAMUSCULAR; INTRAVENOUS at 00:06

## 2025-07-06 NOTE — ED NOTES
Discharge instructions were given to the patient by Jade Thomas RN .  The patient left the Emergency Department alert and oriented and in no acute distress with 1 prescription(s). The patient was encouraged to call or return to the ED for worsening issues or problems and was encouraged to schedule a follow up appointment for continuing care.      Ambulation assessment completed before discharge.  Pt left Emergency Department ambulating at baseline with no ortho devices  Ortho device education: none      The patient verbalized understanding of discharge instructions and prescriptions; all questions were answered. The patient has no further concerns at this time.

## 2025-07-06 NOTE — ED NOTES
Pt presents to ED complaining of bilateral leg swelling x 5 days. Pt endorses +1 pitting edema in LLE and urinary frequency. Pt reports being off his COPD and BP medication for approximately 2 months due to no insurance. Pt reports regaining access to insurance and started taking his medication approximately 3 days ago. Pt denies N/V/ headaches, dizziness, chest pain, cardiac or respiratory distress.  Pt reports last use of heroin and cocaine was this morning. Pt is alert and oriented x 4, RR even and unlabored, skin is warm and dry. Pt appears in NAD at this time. Assessment completed and pt updated on plan of care.  Call bell in reach.      Emergency Department Nursing Plan of Care  The Nursing Plan of Care is developed from the Nursing assessment and Emergency Department Attending provider initial evaluation.  The plan of care may be reviewed in the “ED Provider note”.  The Plan of Care was developed with the following considerations:  Patient / Family readiness to learn indicated by:Refer to Medical chart in [facility]  Persons(s) to be included in education: Refer to Medical chart in [facility]  Barriers to Learning/Limitations:Normal      Signed    Jade Thomas RN    7/6/2025   12:03 AM

## 2025-07-06 NOTE — ED PROVIDER NOTES
losartan 50 MG tablet  Commonly known as: Cozaar  Take 1 tablet by mouth daily     multivitamin Tabs tablet  Take 1 tablet by mouth daily     nicotine 21 MG/24HR  Commonly known as: NICODERM CQ  Place 1 patch onto the skin daily     thiamine 100 MG tablet  Take 1 tablet by mouth daily     tiotropium 18 MCG inhalation capsule  Commonly known as: Spiriva HandiHaler  Inhale 1 capsule into the lungs daily     traZODone 100 MG tablet  Commonly known as: DESYREL  Take 1 tablet by mouth nightly     Trelegy Ellipta 200-62.5-25 MCG/ACT Aepb inhaler  Generic drug: fluticasone-umeclidin-vilant     varenicline 1 MG tablet  Commonly known as: Chantix  Take 1 tablet by mouth 2 times daily               Where to Get Your Medications        These medications were sent to [facility]/pharmacy #[id number] - [place], [place] - [facility] -  [phone] - F [phone]  [address] [facility] [address]      Phone: [phone]   furosemide 20 MG tablet           DISCONTINUED MEDICATIONS:  Discharge Medication List as of 7/6/2025 12:43 AM          I am the Primary Clinician of Record.   Rufino Sauer MD (electronically signed)    (Please note that parts of this dictation were completed with voice recognition software. Quite often unanticipated grammatical, syntax, homophones, and other interpretive errors are inadvertently transcribed by the computer software. Please disregards these errors. Please excuse any errors that have escaped final proofreading.)        Rufino Sauer MD  07/06/25 0343

## 2025-07-25 RX ORDER — LOSARTAN POTASSIUM 50 MG/1
50 TABLET ORAL DAILY
Qty: 90 TABLET | Refills: 1 | Status: SHIPPED | OUTPATIENT
Start: 2025-07-25

## (undated) DEVICE — CANNULA CUSH AD W/ 14FT TBG

## (undated) DEVICE — SOLIDIFIER MEDC 1200ML -- CONVERT TO 356117

## (undated) DEVICE — ELECTRODE,RADIOTRANSLUCENT,FOAM,5PK: Brand: MEDLINE

## (undated) DEVICE — TOWEL 4 PLY TISS 19X30 SUE WHT

## (undated) DEVICE — KIT,1200CC CANISTER,3/16"X6' TUBING: Brand: MEDLINE INDUSTRIES, INC.